# Patient Record
Sex: FEMALE | Race: BLACK OR AFRICAN AMERICAN | NOT HISPANIC OR LATINO | Employment: FULL TIME | URBAN - METROPOLITAN AREA
[De-identification: names, ages, dates, MRNs, and addresses within clinical notes are randomized per-mention and may not be internally consistent; named-entity substitution may affect disease eponyms.]

---

## 2018-04-25 ENCOUNTER — HISTORICAL (OUTPATIENT)
Dept: RADIOLOGY | Facility: HOSPITAL | Age: 48
End: 2018-04-25

## 2018-05-25 ENCOUNTER — HISTORICAL (OUTPATIENT)
Dept: ADMINISTRATIVE | Facility: HOSPITAL | Age: 48
End: 2018-05-25

## 2018-05-25 LAB
ABS NEUT (OLG): 4.28 X10(3)/MCL (ref 2.1–9.2)
BASOPHILS # BLD AUTO: 0.02 X10(3)/MCL
BASOPHILS NFR BLD AUTO: 0 %
EOSINOPHIL # BLD AUTO: 0.09 X10(3)/MCL
EOSINOPHIL NFR BLD AUTO: 1 %
ERYTHROCYTE [DISTWIDTH] IN BLOOD BY AUTOMATED COUNT: 16.2 % (ref 11.5–14.5)
EST. AVERAGE GLUCOSE BLD GHB EST-MCNC: 117 MG/DL
HBA1C MFR BLD: 5.7 % (ref 4.2–6.3)
HCT VFR BLD AUTO: 40.4 % (ref 35–46)
HGB BLD-MCNC: 13.7 GM/DL (ref 12–16)
IMM GRANULOCYTES # BLD AUTO: 0.03 10*3/UL
IMM GRANULOCYTES NFR BLD AUTO: 0 %
LYMPHOCYTES # BLD AUTO: 2.15 X10(3)/MCL
LYMPHOCYTES NFR BLD AUTO: 29 % (ref 13–40)
MCH RBC QN AUTO: 27 PG (ref 26–34)
MCHC RBC AUTO-ENTMCNC: 33.9 GM/DL (ref 31–37)
MCV RBC AUTO: 79.7 FL (ref 80–100)
MONOCYTES # BLD AUTO: 0.79 X10(3)/MCL
MONOCYTES NFR BLD AUTO: 11 % (ref 4–12)
NEUTROPHILS # BLD AUTO: 4.28 X10(3)/MCL
NEUTROPHILS NFR BLD AUTO: 58 X10(3)/MCL
PLATELET # BLD AUTO: 195 X10(3)/MCL (ref 130–400)
PMV BLD AUTO: 10.4 FL (ref 7.4–10.4)
RBC # BLD AUTO: 5.07 X10(6)/MCL (ref 4–5.2)
TSH SERPL-ACNC: 0.78 MIU/L (ref 0.36–3.74)
WBC # SPEC AUTO: 7.4 X10(3)/MCL (ref 4.5–11)

## 2018-07-05 ENCOUNTER — HISTORICAL (OUTPATIENT)
Dept: ADMINISTRATIVE | Facility: HOSPITAL | Age: 48
End: 2018-07-05

## 2018-07-05 LAB
ALBUMIN SERPL-MCNC: 3.4 GM/DL (ref 3.4–5)
ALBUMIN/GLOB SERPL: 1 RATIO (ref 1–2)
ALP SERPL-CCNC: 70 UNIT/L (ref 45–117)
ALT SERPL-CCNC: 22 UNIT/L (ref 12–78)
AST SERPL-CCNC: 18 UNIT/L (ref 15–37)
BILIRUB SERPL-MCNC: 0.5 MG/DL (ref 0.2–1)
BILIRUBIN DIRECT+TOT PNL SERPL-MCNC: 0.1 MG/DL
BILIRUBIN DIRECT+TOT PNL SERPL-MCNC: 0.4 MG/DL
BUN SERPL-MCNC: 19 MG/DL (ref 7–18)
CALCIUM SERPL-MCNC: 9.5 MG/DL (ref 8.5–10.1)
CHLORIDE SERPL-SCNC: 103 MMOL/L (ref 98–107)
CO2 SERPL-SCNC: 27 MMOL/L (ref 21–32)
CREAT SERPL-MCNC: 1.7 MG/DL (ref 0.6–1.3)
ERYTHROCYTE [DISTWIDTH] IN BLOOD BY AUTOMATED COUNT: 13.7 % (ref 11.5–14.5)
GLOBULIN SER-MCNC: 4 GM/ML (ref 2.3–3.5)
GLUCOSE SERPL-MCNC: 92 MG/DL (ref 74–106)
HAV IGM SERPL QL IA: NONREACTIVE
HBV CORE IGM SERPL QL IA: NONREACTIVE
HBV SURFACE AG SERPL QL IA: POSITIVE
HCT VFR BLD AUTO: 40.3 % (ref 35–46)
HCV AB SERPL QL IA: NONREACTIVE
HGB BLD-MCNC: 13.7 GM/DL (ref 12–16)
HIV 1+2 AB+HIV1 P24 AG SERPL QL IA: NONREACTIVE
MCH RBC QN AUTO: 27.4 PG (ref 26–34)
MCHC RBC AUTO-ENTMCNC: 34 GM/DL (ref 31–37)
MCV RBC AUTO: 80.6 FL (ref 80–100)
PLATELET # BLD AUTO: 259 X10(3)/MCL (ref 130–400)
PMV BLD AUTO: 10.7 FL (ref 7.4–10.4)
POTASSIUM SERPL-SCNC: 4.4 MMOL/L (ref 3.5–5.1)
PROT SERPL-MCNC: 7.4 GM/DL (ref 6.4–8.2)
RBC # BLD AUTO: 5 X10(6)/MCL (ref 4–5.2)
SODIUM SERPL-SCNC: 136 MMOL/L (ref 136–145)
T PALLIDUM AB SER QL: NONREACTIVE
WBC # SPEC AUTO: 7.4 X10(3)/MCL (ref 4.5–11)

## 2018-07-19 ENCOUNTER — HOSPITAL ENCOUNTER (OUTPATIENT)
Dept: MEDSURG UNIT | Facility: HOSPITAL | Age: 48
End: 2018-07-20
Attending: OBSTETRICS & GYNECOLOGY | Admitting: OBSTETRICS & GYNECOLOGY

## 2018-07-19 LAB
B-HCG SERPL QL: NEGATIVE
POTASSIUM SERPL-SCNC: 4 MMOL/L (ref 3.5–5.1)

## 2018-07-20 LAB
ABS NEUT (OLG): 9.08 X10(3)/MCL (ref 2.1–9.2)
ALBUMIN SERPL-MCNC: 2.9 GM/DL (ref 3.4–5)
ALBUMIN/GLOB SERPL: 1 RATIO (ref 1–2)
ALP SERPL-CCNC: 40 UNIT/L (ref 45–117)
ALT SERPL-CCNC: 16 UNIT/L (ref 12–78)
AST SERPL-CCNC: 18 UNIT/L (ref 15–37)
BILIRUB SERPL-MCNC: 0.4 MG/DL (ref 0.2–1)
BILIRUBIN DIRECT+TOT PNL SERPL-MCNC: 0.1 MG/DL
BILIRUBIN DIRECT+TOT PNL SERPL-MCNC: 0.3 MG/DL
BUN SERPL-MCNC: 13 MG/DL (ref 7–18)
CALCIUM SERPL-MCNC: 8.4 MG/DL (ref 8.5–10.1)
CHLORIDE SERPL-SCNC: 107 MMOL/L (ref 98–107)
CO2 SERPL-SCNC: 25 MMOL/L (ref 21–32)
CREAT SERPL-MCNC: 1.6 MG/DL (ref 0.6–1.3)
ERYTHROCYTE [DISTWIDTH] IN BLOOD BY AUTOMATED COUNT: 13.2 % (ref 11.5–14.5)
GLOBULIN SER-MCNC: 3.5 GM/ML (ref 2.3–3.5)
GLUCOSE SERPL-MCNC: 119 MG/DL (ref 74–106)
HCT VFR BLD AUTO: 36 % (ref 35–46)
HGB BLD-MCNC: 12.2 GM/DL (ref 12–16)
IMM GRANULOCYTES # BLD AUTO: 0.03 10*3/UL
IMM GRANULOCYTES NFR BLD AUTO: 0 %
LYMPHOCYTES # BLD AUTO: 0.98 X10(3)/MCL
LYMPHOCYTES NFR BLD AUTO: 9 % (ref 13–40)
MCH RBC QN AUTO: 27.4 PG (ref 26–34)
MCHC RBC AUTO-ENTMCNC: 33.9 GM/DL (ref 31–37)
MCV RBC AUTO: 80.7 FL (ref 80–100)
MONOCYTES # BLD AUTO: 0.7 X10(3)/MCL
MONOCYTES NFR BLD AUTO: 6 % (ref 4–12)
NEUTROPHILS # BLD AUTO: 9.08 X10(3)/MCL
NEUTROPHILS NFR BLD AUTO: 84 X10(3)/MCL
PLATELET # BLD AUTO: 171 X10(3)/MCL (ref 130–400)
PMV BLD AUTO: 10.9 FL (ref 7.4–10.4)
POTASSIUM SERPL-SCNC: 4.6 MMOL/L (ref 3.5–5.1)
PROT SERPL-MCNC: 6.4 GM/DL (ref 6.4–8.2)
RBC # BLD AUTO: 4.46 X10(6)/MCL (ref 4–5.2)
SODIUM SERPL-SCNC: 138 MMOL/L (ref 136–145)
WBC # SPEC AUTO: 10.8 X10(3)/MCL (ref 4.5–11)

## 2020-07-09 ENCOUNTER — HISTORICAL (OUTPATIENT)
Dept: FAMILY MEDICINE | Facility: CLINIC | Age: 50
End: 2020-07-09

## 2020-07-09 LAB
ABS NEUT (OLG): 2.86 X10(3)/MCL (ref 2.1–9.2)
ALBUMIN SERPL-MCNC: 3.3 GM/DL (ref 3.4–5)
ALBUMIN/GLOB SERPL: 0.8 RATIO (ref 1.1–2)
ALP SERPL-CCNC: 70 UNIT/L (ref 45–117)
ALT SERPL-CCNC: 22 UNIT/L (ref 12–78)
APPEARANCE, UA: CLEAR
AST SERPL-CCNC: 15 UNIT/L (ref 15–37)
BACTERIA #/AREA URNS AUTO: ABNORMAL /HPF
BASOPHILS # BLD AUTO: 0 X10(3)/MCL (ref 0–0.2)
BASOPHILS NFR BLD AUTO: 0 %
BILIRUB SERPL-MCNC: 0.7 MG/DL (ref 0.2–1)
BILIRUB UR QL STRIP: NEGATIVE
BILIRUBIN DIRECT+TOT PNL SERPL-MCNC: 0.2 MG/DL (ref 0–0.2)
BILIRUBIN DIRECT+TOT PNL SERPL-MCNC: 0.5 MG/DL
BUN SERPL-MCNC: 12 MG/DL (ref 7–18)
CALCIUM SERPL-MCNC: 9.2 MG/DL (ref 8.5–10.1)
CHLORIDE SERPL-SCNC: 105 MMOL/L (ref 98–107)
CHOLEST SERPL-MCNC: 158 MG/DL
CHOLEST/HDLC SERPL: 3.7 {RATIO} (ref 0–4.4)
CO2 SERPL-SCNC: 29 MMOL/L (ref 21–32)
COLOR UR: YELLOW
CREAT SERPL-MCNC: 1.7 MG/DL (ref 0.6–1.3)
EOSINOPHIL # BLD AUTO: 0.1 X10(3)/MCL (ref 0–0.9)
EOSINOPHIL NFR BLD AUTO: 2 %
ERYTHROCYTE [DISTWIDTH] IN BLOOD BY AUTOMATED COUNT: 13.9 % (ref 11.5–14.5)
EST. AVERAGE GLUCOSE BLD GHB EST-MCNC: 134 MG/DL
GLOBULIN SER-MCNC: 4.1 GM/ML (ref 2.3–3.5)
GLUCOSE (UA): NEGATIVE
GLUCOSE SERPL-MCNC: 107 MG/DL (ref 74–106)
HBA1C MFR BLD: 6.3 % (ref 4.2–6.3)
HCT VFR BLD AUTO: 45.5 % (ref 35–46)
HDLC SERPL-MCNC: 43 MG/DL (ref 40–59)
HGB BLD-MCNC: 15.1 GM/DL (ref 12–16)
HGB UR QL STRIP: NEGATIVE
HYALINE CASTS #/AREA URNS LPF: ABNORMAL /LPF
IMM GRANULOCYTES # BLD AUTO: 0.02 10*3/UL
IMM GRANULOCYTES NFR BLD AUTO: 0 %
KETONES UR QL STRIP: NEGATIVE
LDLC SERPL CALC-MCNC: 89 MG/DL
LEUKOCYTE ESTERASE UR QL STRIP: NEGATIVE
LYMPHOCYTES # BLD AUTO: 2.1 X10(3)/MCL (ref 0.6–4.6)
LYMPHOCYTES NFR BLD AUTO: 35 %
MCH RBC QN AUTO: 26.4 PG (ref 26–34)
MCHC RBC AUTO-ENTMCNC: 33.2 GM/DL (ref 31–37)
MCV RBC AUTO: 79.5 FL (ref 80–100)
MONOCYTES # BLD AUTO: 0.8 X10(3)/MCL (ref 0.1–1.3)
MONOCYTES NFR BLD AUTO: 13 %
NEUTROPHILS # BLD AUTO: 2.86 X10(3)/MCL (ref 2.1–9.2)
NEUTROPHILS NFR BLD AUTO: 49 %
NITRITE UR QL STRIP: NEGATIVE
PH UR STRIP: 6 [PH] (ref 4.5–8)
PLATELET # BLD AUTO: 166 X10(3)/MCL (ref 130–400)
PMV BLD AUTO: 11.1 FL (ref 7.4–10.4)
POTASSIUM SERPL-SCNC: 4 MMOL/L (ref 3.5–5.1)
PROT SERPL-MCNC: 7.4 GM/DL (ref 6.4–8.2)
PROT UR QL STRIP: 100 MG/DL
RBC # BLD AUTO: 5.72 X10(6)/MCL (ref 4–5.2)
RBC #/AREA URNS AUTO: ABNORMAL /HPF
SODIUM SERPL-SCNC: 139 MMOL/L (ref 136–145)
SP GR UR STRIP: 1.01 (ref 1–1.03)
SQUAMOUS #/AREA URNS LPF: ABNORMAL /LPF
T4 FREE SERPL-MCNC: 1.13 NG/DL (ref 0.76–1.46)
TRIGL SERPL-MCNC: 131 MG/DL
TSH SERPL-ACNC: 0.77 MIU/L (ref 0.36–3.74)
UROBILINOGEN UR STRIP-ACNC: 2 MG/DL
VLDLC SERPL CALC-MCNC: 26 MG/DL
WBC # SPEC AUTO: 5.8 X10(3)/MCL (ref 4.5–11)
WBC #/AREA URNS AUTO: ABNORMAL /HPF

## 2020-08-31 ENCOUNTER — HISTORICAL (OUTPATIENT)
Dept: ADMINISTRATIVE | Facility: HOSPITAL | Age: 50
End: 2020-08-31

## 2020-08-31 LAB
CREAT UR-MCNC: 42 MG/DL
PROT UR STRIP-MCNC: 45.1 MG/DL
PROT/CREAT UR-RTO: 1073.8 MG/GM

## 2020-09-29 ENCOUNTER — HISTORICAL (OUTPATIENT)
Dept: ADMINISTRATIVE | Facility: HOSPITAL | Age: 50
End: 2020-09-29

## 2020-09-29 LAB
ABS NEUT (OLG): 4.36 X10(3)/MCL (ref 2.1–9.2)
ALBUMIN SERPL-MCNC: 3.5 GM/DL (ref 3.4–5)
ALBUMIN/GLOB SERPL: 0.8 RATIO (ref 1.1–2)
ALP SERPL-CCNC: 84 UNIT/L (ref 45–117)
ALT SERPL-CCNC: 22 UNIT/L (ref 12–78)
AST SERPL-CCNC: 13 UNIT/L (ref 15–37)
BASOPHILS # BLD AUTO: 0 X10(3)/MCL (ref 0–0.2)
BASOPHILS NFR BLD AUTO: 0 %
BILIRUB SERPL-MCNC: 0.3 MG/DL (ref 0.2–1)
BILIRUBIN DIRECT+TOT PNL SERPL-MCNC: 0.1 MG/DL (ref 0–0.2)
BILIRUBIN DIRECT+TOT PNL SERPL-MCNC: 0.2 MG/DL
BUN SERPL-MCNC: 28 MG/DL (ref 7–18)
CALCIUM SERPL-MCNC: 9.7 MG/DL (ref 8.5–10.1)
CHLORIDE SERPL-SCNC: 105 MMOL/L (ref 98–107)
CO2 SERPL-SCNC: 31 MMOL/L (ref 21–32)
CREAT SERPL-MCNC: 1.8 MG/DL (ref 0.6–1.3)
EOSINOPHIL # BLD AUTO: 0.2 X10(3)/MCL (ref 0–0.9)
EOSINOPHIL NFR BLD AUTO: 2 %
ERYTHROCYTE [DISTWIDTH] IN BLOOD BY AUTOMATED COUNT: 14.7 % (ref 11.5–14.5)
GLOBULIN SER-MCNC: 4.3 GM/ML (ref 2.3–3.5)
GLUCOSE SERPL-MCNC: 77 MG/DL (ref 74–106)
HCT VFR BLD AUTO: 43.3 % (ref 35–46)
HGB BLD-MCNC: 14.2 GM/DL (ref 12–16)
IMM GRANULOCYTES # BLD AUTO: 0.02 10*3/UL
IMM GRANULOCYTES NFR BLD AUTO: 0 %
LYMPHOCYTES # BLD AUTO: 1.6 X10(3)/MCL (ref 0.6–4.6)
LYMPHOCYTES NFR BLD AUTO: 24 %
MCH RBC QN AUTO: 26.4 PG (ref 26–34)
MCHC RBC AUTO-ENTMCNC: 32.8 GM/DL (ref 31–37)
MCV RBC AUTO: 80.5 FL (ref 80–100)
MONOCYTES # BLD AUTO: 0.8 X10(3)/MCL (ref 0.1–1.3)
MONOCYTES NFR BLD AUTO: 11 %
NEUTROPHILS # BLD AUTO: 4.36 X10(3)/MCL (ref 2.1–9.2)
NEUTROPHILS NFR BLD AUTO: 62 %
PLATELET # BLD AUTO: 221 X10(3)/MCL (ref 130–400)
PMV BLD AUTO: 10.5 FL (ref 7.4–10.4)
POTASSIUM SERPL-SCNC: 4 MMOL/L (ref 3.5–5.1)
PROT SERPL-MCNC: 7.8 GM/DL (ref 6.4–8.2)
RBC # BLD AUTO: 5.38 X10(6)/MCL (ref 4–5.2)
SODIUM SERPL-SCNC: 140 MMOL/L (ref 136–145)
WBC # SPEC AUTO: 7 X10(3)/MCL (ref 4.5–11)

## 2020-10-12 ENCOUNTER — HISTORICAL (OUTPATIENT)
Dept: ADMINISTRATIVE | Facility: HOSPITAL | Age: 50
End: 2020-10-12

## 2020-10-12 LAB
EST. AVERAGE GLUCOSE BLD GHB EST-MCNC: 151 MG/DL
HBA1C MFR BLD: 6.9 % (ref 4.2–6.3)

## 2020-10-29 ENCOUNTER — HISTORICAL (OUTPATIENT)
Dept: NEPHROLOGY | Facility: CLINIC | Age: 50
End: 2020-10-29

## 2020-10-29 LAB
ALBUMIN SERPL-MCNC: 3.5 GM/DL (ref 3.5–5)
ALBUMIN/GLOB SERPL: 0.9 RATIO (ref 1.1–2)
ALP SERPL-CCNC: 80 UNIT/L (ref 40–150)
ALT SERPL-CCNC: 14 UNIT/L (ref 0–55)
APPEARANCE, UA: CLEAR
AST SERPL-CCNC: 25 UNIT/L (ref 5–34)
BACTERIA #/AREA URNS AUTO: ABNORMAL /HPF
BILIRUB SERPL-MCNC: 0.4 MG/DL
BILIRUB UR QL STRIP: NEGATIVE
BILIRUBIN DIRECT+TOT PNL SERPL-MCNC: 0.1 MG/DL (ref 0–0.5)
BILIRUBIN DIRECT+TOT PNL SERPL-MCNC: 0.3 MG/DL (ref 0–0.8)
BUN SERPL-MCNC: 18 MG/DL (ref 9.8–20.1)
CALCIUM SERPL-MCNC: 9.7 MG/DL (ref 8.4–10.2)
CHLORIDE SERPL-SCNC: 103 MMOL/L (ref 98–107)
CO2 SERPL-SCNC: 29 MMOL/L (ref 22–29)
COLOR UR: ABNORMAL
CREAT SERPL-MCNC: 1.75 MG/DL (ref 0.55–1.02)
CREAT UR-MCNC: 85.1 MG/DL (ref 45–106)
GLOBULIN SER-MCNC: 3.7 GM/DL (ref 2.4–3.5)
GLUCOSE (UA): NEGATIVE
GLUCOSE SERPL-MCNC: 129 MG/DL (ref 74–100)
HGB UR QL STRIP: NEGATIVE
HYALINE CASTS #/AREA URNS LPF: ABNORMAL /LPF
KETONES UR QL STRIP: NEGATIVE
LEUKOCYTE ESTERASE UR QL STRIP: NEGATIVE
NITRITE UR QL STRIP: NEGATIVE
PH UR STRIP: 5.5 [PH] (ref 4.5–8)
POTASSIUM SERPL-SCNC: 3.9 MMOL/L (ref 3.5–5.1)
PROT SERPL-MCNC: 7.2 GM/DL (ref 6.4–8.3)
PROT UR QL STRIP: 50 MG/DL
PROT UR STRIP-MCNC: 63.6 MG/DL
PROT/CREAT UR-RTO: 747.4 MG/GM
RBC #/AREA URNS AUTO: ABNORMAL /HPF
SODIUM SERPL-SCNC: 138 MMOL/L (ref 136–145)
SP GR UR STRIP: 1.01 (ref 1–1.03)
SQUAMOUS #/AREA URNS LPF: ABNORMAL /LPF
URATE SERPL-MCNC: 7.9 MG/DL (ref 2.6–6)
UROBILINOGEN UR STRIP-ACNC: NORMAL
WBC #/AREA URNS AUTO: ABNORMAL /HPF

## 2020-11-30 ENCOUNTER — HISTORICAL (OUTPATIENT)
Dept: RADIOLOGY | Facility: HOSPITAL | Age: 50
End: 2020-11-30

## 2021-01-26 ENCOUNTER — HISTORICAL (OUTPATIENT)
Dept: NEPHROLOGY | Facility: CLINIC | Age: 51
End: 2021-01-26

## 2021-01-26 LAB
ALBUMIN SERPL-MCNC: 3.7 GM/DL (ref 3.5–5)
ALBUMIN/GLOB SERPL: 1 RATIO (ref 1.1–2)
ALP SERPL-CCNC: 88 UNIT/L (ref 40–150)
ALT SERPL-CCNC: 18 UNIT/L (ref 0–55)
APPEARANCE, UA: CLEAR
AST SERPL-CCNC: 18 UNIT/L (ref 5–34)
BACTERIA #/AREA URNS AUTO: ABNORMAL /HPF
BILIRUB SERPL-MCNC: 0.4 MG/DL
BILIRUB UR QL STRIP: NEGATIVE
BILIRUBIN DIRECT+TOT PNL SERPL-MCNC: 0.1 MG/DL (ref 0–0.5)
BILIRUBIN DIRECT+TOT PNL SERPL-MCNC: 0.3 MG/DL (ref 0–0.8)
BUN SERPL-MCNC: 23 MG/DL (ref 9.8–20.1)
CALCIUM SERPL-MCNC: 9.6 MG/DL (ref 8.4–10.2)
CHLORIDE SERPL-SCNC: 103 MMOL/L (ref 98–107)
CO2 SERPL-SCNC: 27 MMOL/L (ref 22–29)
COLOR UR: ABNORMAL
CREAT SERPL-MCNC: 1.78 MG/DL (ref 0.55–1.02)
CREAT UR-MCNC: 57.1 MG/DL (ref 45–106)
GLOBULIN SER-MCNC: 3.7 GM/DL (ref 2.4–3.5)
GLUCOSE (UA): NEGATIVE
GLUCOSE SERPL-MCNC: 111 MG/DL (ref 74–100)
HAV IGM SERPL QL IA: NONREACTIVE
HBV CORE IGM SERPL QL IA: NONREACTIVE
HBV SURFACE AG SERPL QL IA: REACTIVE
HBV SURFACE AG SERPLBLD QL IA.RAPID: ABNORMAL
HCV AB SERPL QL IA: NONREACTIVE
HGB UR QL STRIP: NEGATIVE
HIV 1+2 AB+HIV1 P24 AG SERPL QL IA: NONREACTIVE
HYALINE CASTS #/AREA URNS LPF: ABNORMAL /LPF
KETONES UR QL STRIP: NEGATIVE
LEUKOCYTE ESTERASE UR QL STRIP: NEGATIVE
NITRITE UR QL STRIP: NEGATIVE
PH UR STRIP: 6 [PH] (ref 4.5–8)
POTASSIUM SERPL-SCNC: 4 MMOL/L (ref 3.5–5.1)
PROT SERPL-MCNC: 7.4 GM/DL (ref 6.4–8.3)
PROT UR QL STRIP: 30 MG/DL
PROT UR STRIP-MCNC: 42.6 MG/DL
PROT/CREAT UR-RTO: 746.1 MG/GM
RBC #/AREA URNS AUTO: ABNORMAL /HPF
SERINE PROT 3-ARUP: 2 AU/ML
SODIUM SERPL-SCNC: 138 MMOL/L (ref 136–145)
SP GR UR STRIP: 1 (ref 1–1.03)
SQUAMOUS #/AREA URNS LPF: ABNORMAL /LPF
URATE SERPL-MCNC: 10 MG/DL (ref 2.6–6)
UROBILINOGEN UR STRIP-ACNC: NORMAL
WBC #/AREA URNS AUTO: ABNORMAL /HPF

## 2021-02-19 ENCOUNTER — HISTORICAL (OUTPATIENT)
Dept: RADIOLOGY | Facility: HOSPITAL | Age: 51
End: 2021-02-19

## 2021-02-19 LAB
ABS NEUT (OLG): 4.11 X10(3)/MCL (ref 2.1–9.2)
ALBUMIN SERPL-MCNC: 3.6 GM/DL (ref 3.5–5)
ALBUMIN/GLOB SERPL: 1.1 RATIO (ref 1.1–2)
ALP SERPL-CCNC: 94 UNIT/L (ref 40–150)
ALT SERPL-CCNC: 18 UNIT/L (ref 0–55)
AST SERPL-CCNC: 18 UNIT/L (ref 5–34)
BASOPHILS # BLD AUTO: 0 X10(3)/MCL (ref 0–0.2)
BASOPHILS NFR BLD AUTO: 1 %
BILIRUB SERPL-MCNC: 0.4 MG/DL
BILIRUBIN DIRECT+TOT PNL SERPL-MCNC: 0.1 MG/DL (ref 0–0.5)
BILIRUBIN DIRECT+TOT PNL SERPL-MCNC: 0.3 MG/DL (ref 0–0.8)
BUN SERPL-MCNC: 22 MG/DL (ref 9.8–20.1)
CALCIUM SERPL-MCNC: 9.6 MG/DL (ref 8.4–10.2)
CHLORIDE SERPL-SCNC: 104 MMOL/L (ref 98–107)
CO2 SERPL-SCNC: 27 MMOL/L (ref 22–29)
CREAT SERPL-MCNC: 1.74 MG/DL (ref 0.55–1.02)
EOSINOPHIL # BLD AUTO: 0.2 X10(3)/MCL (ref 0–0.9)
EOSINOPHIL NFR BLD AUTO: 2 %
ERYTHROCYTE [DISTWIDTH] IN BLOOD BY AUTOMATED COUNT: 13.6 % (ref 11.5–14.5)
GLOBULIN SER-MCNC: 3.3 GM/DL (ref 2.4–3.5)
GLUCOSE SERPL-MCNC: 99 MG/DL (ref 74–100)
HAV AB SER QL IA: NONREACTIVE
HAV IGM SERPL QL IA: NONREACTIVE
HBV CORE AB SERPL QL IA: REACTIVE
HBV SURFACE AB SER-ACNC: 0 M[IU]/ML
HBV SURFACE AB SERPL IA-ACNC: NONREACTIVE M[IU]/ML
HBV SURFACE AG SERPL QL IA: REACTIVE
HBV SURFACE AG SERPLBLD QL IA.RAPID: ABNORMAL
HCT VFR BLD AUTO: 38.6 % (ref 35–46)
HCV AB SERPL QL IA: NONREACTIVE
HGB BLD-MCNC: 12.9 GM/DL (ref 12–16)
HIV 1+2 AB+HIV1 P24 AG SERPL QL IA: NONREACTIVE
IMM GRANULOCYTES # BLD AUTO: 0.03 10*3/UL
IMM GRANULOCYTES NFR BLD AUTO: 0 %
INR PPP: 0.93 (ref 0.9–1.2)
LYMPHOCYTES # BLD AUTO: 1.9 X10(3)/MCL (ref 0.6–4.6)
LYMPHOCYTES NFR BLD AUTO: 27 %
MCH RBC QN AUTO: 27 PG (ref 26–34)
MCHC RBC AUTO-ENTMCNC: 33.4 GM/DL (ref 31–37)
MCV RBC AUTO: 80.8 FL (ref 80–100)
MONOCYTES # BLD AUTO: 0.8 X10(3)/MCL (ref 0.1–1.3)
MONOCYTES NFR BLD AUTO: 11 %
NEUTROPHILS # BLD AUTO: 4.11 X10(3)/MCL (ref 2.1–9.2)
NEUTROPHILS NFR BLD AUTO: 58 %
PLATELET # BLD AUTO: 216 X10(3)/MCL (ref 130–400)
PMV BLD AUTO: 11.9 FL (ref 7.4–10.4)
POTASSIUM SERPL-SCNC: 4.8 MMOL/L (ref 3.5–5.1)
PROT SERPL-MCNC: 6.9 GM/DL (ref 6.4–8.3)
PROTHROMBIN TIME: 12.1 SECOND(S) (ref 11.9–14.4)
RBC # BLD AUTO: 4.78 X10(6)/MCL (ref 4–5.2)
SODIUM SERPL-SCNC: 137 MMOL/L (ref 136–145)
T PALLIDUM AB SER QL: NONREACTIVE
WBC # SPEC AUTO: 7.1 X10(3)/MCL (ref 4.5–11)

## 2021-05-03 ENCOUNTER — HISTORICAL (OUTPATIENT)
Dept: RADIOLOGY | Facility: HOSPITAL | Age: 51
End: 2021-05-03

## 2021-06-08 ENCOUNTER — HISTORICAL (OUTPATIENT)
Dept: NEPHROLOGY | Facility: CLINIC | Age: 51
End: 2021-06-08

## 2021-06-08 LAB
ABS NEUT (OLG): 4.28 X10(3)/MCL (ref 2.1–9.2)
ALBUMIN % SPEP (OHS): 46.73 % (ref 48.1–59.5)
ALBUMIN SERPL-MCNC: 3.5 GM/DL (ref 3.5–5)
ALBUMIN SERPL-MCNC: 3.6 GM/DL (ref 3.5–5)
ALBUMIN/GLOB SERPL: 0.9 RATIO (ref 1.1–2)
ALBUMIN/GLOB SERPL: 1.1 RATIO (ref 1.1–2)
ALP SERPL-CCNC: 71 UNIT/L (ref 40–150)
ALPHA 1 GLOB (OHS): 0.21 GM/DL (ref 0–0.4)
ALPHA 1 GLOB% (OHS): 3.04 % (ref 2.3–4.9)
ALPHA 2 GLOB % (OHS): 12.92 % (ref 6.9–13)
ALPHA 2 GLOB (OHS): 0.89 GM/DL (ref 0.4–1)
ALT SERPL-CCNC: 17 UNIT/L (ref 0–55)
APPEARANCE, UA: CLEAR
AST SERPL-CCNC: 20 UNIT/L (ref 5–34)
BACTERIA #/AREA URNS AUTO: ABNORMAL /HPF
BASOPHILS # BLD AUTO: 0 X10(3)/MCL (ref 0–0.2)
BASOPHILS NFR BLD AUTO: 0 %
BETA GLOB (OHS): 1.1 GM/DL (ref 0.7–1.3)
BETA GLOB% (OHS): 15.96 % (ref 13.8–19.7)
BILIRUB SERPL-MCNC: 0.5 MG/DL
BILIRUB UR QL STRIP: NEGATIVE
BILIRUBIN DIRECT+TOT PNL SERPL-MCNC: 0.2 MG/DL (ref 0–0.5)
BILIRUBIN DIRECT+TOT PNL SERPL-MCNC: 0.3 MG/DL (ref 0–0.8)
BUN SERPL-MCNC: 16.6 MG/DL (ref 9.8–20.1)
CALCIUM SERPL-MCNC: 9.8 MG/DL (ref 8.4–10.2)
CHLORIDE SERPL-SCNC: 105 MMOL/L (ref 98–107)
CO2 SERPL-SCNC: 25 MMOL/L (ref 22–29)
COLOR UR: ABNORMAL
CREAT SERPL-MCNC: 1.73 MG/DL (ref 0.55–1.02)
CREAT UR-MCNC: 89.1 MG/DL (ref 45–106)
EOSINOPHIL # BLD AUTO: 0.2 X10(3)/MCL (ref 0–0.9)
EOSINOPHIL NFR BLD AUTO: 3 %
ERYTHROCYTE [DISTWIDTH] IN BLOOD BY AUTOMATED COUNT: 14.4 % (ref 11.5–14.5)
GAMMA GLOBULIN % (OHS): 21.34 % (ref 10.1–21.9)
GAMMA GLOBULIN (OHS): 1.47 GM/DL (ref 0.4–1.8)
GLOBULIN SER-MCNC: 3.3 GM/DL (ref 2.4–3.5)
GLOBULIN SER-MCNC: 3.7 GM/DL (ref 2.4–3.5)
GLUCOSE (UA): NEGATIVE
GLUCOSE SERPL-MCNC: 116 MG/DL (ref 74–100)
HCT VFR BLD AUTO: 39.4 % (ref 35–46)
HGB BLD-MCNC: 13.1 GM/DL (ref 12–16)
HGB UR QL STRIP: NEGATIVE
HYALINE CASTS #/AREA URNS LPF: ABNORMAL /LPF
IFE INTERPRETATION (OHS): ABNORMAL
IGA SERPL-MCNC: 178 MG/DL (ref 65–421)
IGG SERPL-MCNC: 1380 MG/DL (ref 552–1632)
IGM SERPL-MCNC: 42 MG/DL (ref 33–293)
IMM GRANULOCYTES # BLD AUTO: 0.03 10*3/UL
IMM GRANULOCYTES NFR BLD AUTO: 0 %
KETONES UR QL STRIP: NEGATIVE
LEUKOCYTE ESTERASE UR QL STRIP: NEGATIVE
LYMPHOCYTES # BLD AUTO: 1.9 X10(3)/MCL (ref 0.6–4.6)
LYMPHOCYTES NFR BLD AUTO: 27 %
M SPIKE % (OHS): 3.51 %
M SPIKE (OHS): 0.24 GM/DL
MCH RBC QN AUTO: 26.1 PG (ref 26–34)
MCHC RBC AUTO-ENTMCNC: 33.2 GM/DL (ref 31–37)
MCV RBC AUTO: 78.6 FL (ref 80–100)
MONOCYTES # BLD AUTO: 0.6 X10(3)/MCL (ref 0.1–1.3)
MONOCYTES NFR BLD AUTO: 9 %
NEUTROPHILS # BLD AUTO: 4.28 X10(3)/MCL (ref 2.1–9.2)
NEUTROPHILS NFR BLD AUTO: 61 %
NITRITE UR QL STRIP: NEGATIVE
NRBC BLD AUTO-RTO: 0 % (ref 0–0.2)
PEP GRAPH (OHS): ABNORMAL
PH UR STRIP: 7 [PH] (ref 4.5–8)
PLATELET # BLD AUTO: 221 X10(3)/MCL (ref 130–400)
PMV BLD AUTO: 10.1 FL (ref 7.4–10.4)
POTASSIUM SERPL-SCNC: 4.3 MMOL/L (ref 3.5–5.1)
PROT SERPL-MCNC: 6.9 GM/DL (ref 6.4–8.3)
PROT SERPL-MCNC: 7.2 GM/DL (ref 6.4–8.3)
PROT UR QL STRIP: 50 MG/DL
PROT UR STRIP-MCNC: 64.1 MG/DL
PROT/CREAT UR-RTO: 719.4 MG/GM CR
RBC # BLD AUTO: 5.01 X10(6)/MCL (ref 4–5.2)
RBC #/AREA URNS AUTO: ABNORMAL /HPF
SODIUM SERPL-SCNC: 138 MMOL/L (ref 136–145)
SP GR UR STRIP: 1 (ref 1–1.03)
SQUAMOUS #/AREA URNS LPF: ABNORMAL /LPF
URATE SERPL-MCNC: 9.7 MG/DL (ref 2.6–6)
UROBILINOGEN UR STRIP-ACNC: NORMAL
WBC # SPEC AUTO: 7 X10(3)/MCL (ref 4.5–11)
WBC #/AREA URNS AUTO: ABNORMAL /HPF

## 2021-07-13 ENCOUNTER — HISTORICAL (OUTPATIENT)
Dept: ADMINISTRATIVE | Facility: HOSPITAL | Age: 51
End: 2021-07-13

## 2021-07-13 LAB
ABS NEUT (OLG): 5.92 X10(3)/MCL (ref 2.1–9.2)
ALBUMIN SERPL-MCNC: 3.6 GM/DL (ref 3.5–5)
ALBUMIN/GLOB SERPL: 1 RATIO (ref 1.1–2)
ALP SERPL-CCNC: 97 UNIT/L (ref 40–150)
ALT SERPL-CCNC: 17 UNIT/L (ref 0–55)
AST SERPL-CCNC: 18 UNIT/L (ref 5–34)
BASOPHILS # BLD AUTO: 0 X10(3)/MCL (ref 0–0.2)
BASOPHILS NFR BLD AUTO: 0 %
BILIRUB SERPL-MCNC: 0.3 MG/DL
BILIRUBIN DIRECT+TOT PNL SERPL-MCNC: 0.1 MG/DL (ref 0–0.5)
BILIRUBIN DIRECT+TOT PNL SERPL-MCNC: 0.2 MG/DL (ref 0–0.8)
BUN SERPL-MCNC: 20.7 MG/DL (ref 9.8–20.1)
CALCIUM SERPL-MCNC: 9.8 MG/DL (ref 8.4–10.2)
CHLORIDE SERPL-SCNC: 103 MMOL/L (ref 98–107)
CO2 SERPL-SCNC: 28 MMOL/L (ref 22–29)
CREAT SERPL-MCNC: 2.02 MG/DL (ref 0.55–1.02)
EOSINOPHIL # BLD AUTO: 0.2 X10(3)/MCL (ref 0–0.9)
EOSINOPHIL NFR BLD AUTO: 2 %
ERYTHROCYTE [DISTWIDTH] IN BLOOD BY AUTOMATED COUNT: 14.6 % (ref 11.5–14.5)
GLOBULIN SER-MCNC: 3.6 GM/DL (ref 2.4–3.5)
GLUCOSE SERPL-MCNC: 106 MG/DL (ref 74–100)
HCT VFR BLD AUTO: 40.4 % (ref 35–46)
HGB BLD-MCNC: 13.4 GM/DL (ref 12–16)
IMM GRANULOCYTES # BLD AUTO: 0.06 10*3/UL
IMM GRANULOCYTES NFR BLD AUTO: 1 %
LYMPHOCYTES # BLD AUTO: 2 X10(3)/MCL (ref 0.6–4.6)
LYMPHOCYTES NFR BLD AUTO: 22 %
MCH RBC QN AUTO: 26.4 PG (ref 26–34)
MCHC RBC AUTO-ENTMCNC: 33.2 GM/DL (ref 31–37)
MCV RBC AUTO: 79.7 FL (ref 80–100)
MONOCYTES # BLD AUTO: 1 X10(3)/MCL (ref 0.1–1.3)
MONOCYTES NFR BLD AUTO: 11 %
NEUTROPHILS # BLD AUTO: 5.92 X10(3)/MCL (ref 2.1–9.2)
NEUTROPHILS NFR BLD AUTO: 64 %
NRBC BLD AUTO-RTO: 0 % (ref 0–0.2)
PLATELET # BLD AUTO: 252 X10(3)/MCL (ref 130–400)
PMV BLD AUTO: 10.6 FL (ref 7.4–10.4)
POTASSIUM SERPL-SCNC: 4.5 MMOL/L (ref 3.5–5.1)
PROT SERPL-MCNC: 7.2 GM/DL (ref 6.4–8.3)
RBC # BLD AUTO: 5.07 X10(6)/MCL (ref 4–5.2)
SODIUM SERPL-SCNC: 137 MMOL/L (ref 136–145)
WBC # SPEC AUTO: 9.3 X10(3)/MCL (ref 4.5–11)

## 2021-08-02 ENCOUNTER — HISTORICAL (OUTPATIENT)
Dept: HEMATOLOGY/ONCOLOGY | Facility: CLINIC | Age: 51
End: 2021-08-02

## 2021-08-02 LAB
ABS NEUT (OLG): 6.09 X10(3)/MCL (ref 2.1–9.2)
ALBUMIN SERPL-MCNC: 3.5 GM/DL (ref 3.5–5)
ALBUMIN/GLOB SERPL: 1 RATIO (ref 1.1–2)
ALP SERPL-CCNC: 71 UNIT/L (ref 40–150)
ALT SERPL-CCNC: 17 UNIT/L (ref 0–55)
AST SERPL-CCNC: 16 UNIT/L (ref 5–34)
BASOPHILS # BLD AUTO: 0 X10(3)/MCL (ref 0–0.2)
BASOPHILS NFR BLD AUTO: 0 %
BILIRUB SERPL-MCNC: 0.4 MG/DL
BILIRUBIN DIRECT+TOT PNL SERPL-MCNC: 0.2 MG/DL (ref 0–0.5)
BILIRUBIN DIRECT+TOT PNL SERPL-MCNC: 0.2 MG/DL (ref 0–0.8)
BUN SERPL-MCNC: 16.3 MG/DL (ref 9.8–20.1)
CALCIUM SERPL-MCNC: 10.1 MG/DL (ref 8.4–10.2)
CHLORIDE SERPL-SCNC: 106 MMOL/L (ref 98–107)
CO2 SERPL-SCNC: 29 MMOL/L (ref 22–29)
CREAT SERPL-MCNC: 1.83 MG/DL (ref 0.55–1.02)
EOSINOPHIL # BLD AUTO: 0.2 X10(3)/MCL (ref 0–0.9)
EOSINOPHIL NFR BLD AUTO: 2 %
ERYTHROCYTE [DISTWIDTH] IN BLOOD BY AUTOMATED COUNT: 14.6 % (ref 11.5–14.5)
GLOBULIN SER-MCNC: 3.6 GM/DL (ref 2.4–3.5)
GLUCOSE SERPL-MCNC: 118 MG/DL (ref 74–100)
HCT VFR BLD AUTO: 40.8 % (ref 35–46)
HGB BLD-MCNC: 13.2 GM/DL (ref 12–16)
IMM GRANULOCYTES # BLD AUTO: 0.05 10*3/UL
IMM GRANULOCYTES NFR BLD AUTO: 0 %
LYMPHOCYTES # BLD AUTO: 2 X10(3)/MCL (ref 0.6–4.6)
LYMPHOCYTES NFR BLD AUTO: 22 %
MCH RBC QN AUTO: 25.9 PG (ref 26–34)
MCHC RBC AUTO-ENTMCNC: 32.4 GM/DL (ref 31–37)
MCV RBC AUTO: 80 FL (ref 80–100)
MONOCYTES # BLD AUTO: 0.9 X10(3)/MCL (ref 0.1–1.3)
MONOCYTES NFR BLD AUTO: 10 %
NEUTROPHILS # BLD AUTO: 6.09 X10(3)/MCL (ref 2.1–9.2)
NEUTROPHILS NFR BLD AUTO: 66 %
NRBC BLD AUTO-RTO: 0 % (ref 0–0.2)
PLATELET # BLD AUTO: 222 X10(3)/MCL (ref 130–400)
PMV BLD AUTO: 10.1 FL (ref 7.4–10.4)
POTASSIUM SERPL-SCNC: 4.7 MMOL/L (ref 3.5–5.1)
PROT SERPL-MCNC: 7.1 GM/DL (ref 6.4–8.3)
RBC # BLD AUTO: 5.1 X10(6)/MCL (ref 4–5.2)
SODIUM SERPL-SCNC: 139 MMOL/L (ref 136–145)
WBC # SPEC AUTO: 9.3 X10(3)/MCL (ref 4.5–11)

## 2021-08-03 ENCOUNTER — HISTORICAL (OUTPATIENT)
Dept: ENDOSCOPY | Facility: HOSPITAL | Age: 51
End: 2021-08-03

## 2021-11-04 ENCOUNTER — HISTORICAL (OUTPATIENT)
Dept: ADMINISTRATIVE | Facility: HOSPITAL | Age: 51
End: 2021-11-04

## 2021-11-04 LAB
ABS NEUT (OLG): 3.99 X10(3)/MCL (ref 2.1–9.2)
ALBUMIN SERPL-MCNC: 3.6 GM/DL (ref 3.5–5)
ALBUMIN/GLOB SERPL: 1 RATIO (ref 1.1–2)
ALP SERPL-CCNC: 85 UNIT/L (ref 40–150)
ALT SERPL-CCNC: 16 UNIT/L (ref 0–55)
APPEARANCE, UA: CLEAR
AST SERPL-CCNC: 18 UNIT/L (ref 5–34)
BACTERIA #/AREA URNS AUTO: ABNORMAL /HPF
BASOPHILS # BLD AUTO: 0 X10(3)/MCL (ref 0–0.2)
BASOPHILS NFR BLD AUTO: 0 %
BILIRUB SERPL-MCNC: 0.5 MG/DL
BILIRUB UR QL STRIP: NEGATIVE
BILIRUBIN DIRECT+TOT PNL SERPL-MCNC: 0.2 MG/DL (ref 0–0.5)
BILIRUBIN DIRECT+TOT PNL SERPL-MCNC: 0.3 MG/DL (ref 0–0.8)
BUN SERPL-MCNC: 16.1 MG/DL (ref 9.8–20.1)
CALCIUM SERPL-MCNC: 9.7 MG/DL (ref 8.7–10.5)
CHLORIDE SERPL-SCNC: 106 MMOL/L (ref 98–107)
CO2 SERPL-SCNC: 26 MMOL/L (ref 22–29)
COLOR UR: ABNORMAL
CREAT SERPL-MCNC: 1.79 MG/DL (ref 0.55–1.02)
CREAT UR-MCNC: 84.1 MG/DL (ref 45–106)
EOSINOPHIL # BLD AUTO: 0.3 X10(3)/MCL (ref 0–0.9)
EOSINOPHIL NFR BLD AUTO: 4 %
ERYTHROCYTE [DISTWIDTH] IN BLOOD BY AUTOMATED COUNT: 15.5 % (ref 11.5–14.5)
EST CREAT CLEARANCE SER (OHS): 30.06 ML/MIN
GLOBULIN SER-MCNC: 3.7 GM/DL (ref 2.4–3.5)
GLUCOSE (UA): NEGATIVE
GLUCOSE SERPL-MCNC: 113 MG/DL (ref 74–100)
HBV SURFACE AB SER-ACNC: 0.87 MIU/ML
HBV SURFACE AB SERPL IA-ACNC: NONREACTIVE M[IU]/ML
HBV SURFACE AG SERPL QL IA: REACTIVE
HBV SURFACE AG SERPLBLD QL IA.RAPID: ABNORMAL
HCT VFR BLD AUTO: 41.4 % (ref 35–46)
HGB BLD-MCNC: 13.7 GM/DL (ref 12–16)
HGB UR QL STRIP: NEGATIVE
HYALINE CASTS #/AREA URNS LPF: ABNORMAL /LPF
IMM GRANULOCYTES # BLD AUTO: 0.02 10*3/UL
IMM GRANULOCYTES NFR BLD AUTO: 0 %
INR PPP: 0.93 (ref 0.9–1.2)
KETONES UR QL STRIP: NEGATIVE
LEUKOCYTE ESTERASE UR QL STRIP: NEGATIVE
LYMPHOCYTES # BLD AUTO: 2 X10(3)/MCL (ref 0.6–4.6)
LYMPHOCYTES NFR BLD AUTO: 28 %
MCH RBC QN AUTO: 26.5 PG (ref 26–34)
MCHC RBC AUTO-ENTMCNC: 33.1 GM/DL (ref 31–37)
MCV RBC AUTO: 80.1 FL (ref 80–100)
MONOCYTES # BLD AUTO: 0.8 X10(3)/MCL (ref 0.1–1.3)
MONOCYTES NFR BLD AUTO: 12 %
NEUTROPHILS # BLD AUTO: 3.99 X10(3)/MCL (ref 2.1–9.2)
NEUTROPHILS NFR BLD AUTO: 56 %
NITRITE UR QL STRIP: NEGATIVE
NRBC BLD AUTO-RTO: 0 % (ref 0–0.2)
PH UR STRIP: 6.5 [PH] (ref 4.5–8)
PLATELET # BLD AUTO: 204 X10(3)/MCL (ref 130–400)
PMV BLD AUTO: 10.6 FL (ref 7.4–10.4)
POTASSIUM SERPL-SCNC: 3.9 MMOL/L (ref 3.5–5.1)
PROT SERPL-MCNC: 7.3 GM/DL (ref 6.4–8.3)
PROT UR QL STRIP: 50 MG/DL
PROT UR STRIP-MCNC: 55 MG/DL
PROT/CREAT UR-RTO: 654 MG/GM CR
PROTHROMBIN TIME: 12.3 SECOND(S) (ref 11.9–14.4)
RBC # BLD AUTO: 5.17 X10(6)/MCL (ref 4–5.2)
RBC #/AREA URNS AUTO: ABNORMAL /HPF
SODIUM SERPL-SCNC: 140 MMOL/L (ref 136–145)
SP GR UR STRIP: 1.01 (ref 1–1.03)
SQUAMOUS #/AREA URNS LPF: ABNORMAL /LPF
UROBILINOGEN UR STRIP-ACNC: NORMAL
WBC # SPEC AUTO: 7.2 X10(3)/MCL (ref 4.5–11)
WBC #/AREA URNS AUTO: ABNORMAL /HPF

## 2021-11-23 ENCOUNTER — HISTORICAL (OUTPATIENT)
Dept: RADIOLOGY | Facility: HOSPITAL | Age: 51
End: 2021-11-23

## 2022-02-04 ENCOUNTER — HISTORICAL (OUTPATIENT)
Dept: NEPHROLOGY | Facility: CLINIC | Age: 52
End: 2022-02-04

## 2022-02-04 LAB
ALBUMIN SERPL-MCNC: 3.6 G/DL (ref 3.5–5)
ALBUMIN/GLOB SERPL: 1 {RATIO} (ref 1.1–2)
ALP SERPL-CCNC: 76 U/L (ref 40–150)
ALT SERPL-CCNC: 13 U/L (ref 0–55)
APPEARANCE, UA: CLEAR
AST SERPL-CCNC: 15 U/L (ref 5–34)
BACTERIA SPEC CULT: NORMAL
BILIRUB SERPL-MCNC: 0.6 MG/DL
BILIRUB UR QL STRIP: NEGATIVE
BILIRUBIN DIRECT+TOT PNL SERPL-MCNC: 0.2 (ref 0–0.5)
BILIRUBIN DIRECT+TOT PNL SERPL-MCNC: 0.4 (ref 0–0.8)
BUN SERPL-MCNC: 20 MG/DL (ref 9.8–20.1)
CALCIUM SERPL-MCNC: 10.1 MG/DL (ref 8.7–10.5)
CHLORIDE SERPL-SCNC: 100 MMOL/L (ref 98–107)
CO2 SERPL-SCNC: 28 MMOL/L (ref 22–29)
COLOR UR: NORMAL
CREAT SERPL-MCNC: 1.63 MG/DL (ref 0.55–1.02)
CREAT UR-MCNC: 79.5 MG/DL (ref 45–106)
GLOBULIN SER-MCNC: 3.6 G/DL (ref 2.4–3.5)
GLUCOSE (UA): NORMAL
GLUCOSE SERPL-MCNC: 125 MG/DL (ref 74–100)
HGB UR QL STRIP: NEGATIVE
HYALINE CASTS #/AREA URNS LPF: NORMAL /[LPF]
ICTERIC INTERF INDEX SERPL-ACNC: 0
KETONES UR QL STRIP: NEGATIVE
LEUKOCYTE ESTERASE UR QL STRIP: NEGATIVE
LIPEMIC INTERF INDEX SERPL-ACNC: 5
NITRITE UR QL STRIP: NEGATIVE
PH UR STRIP: 6 [PH] (ref 4.5–8)
POTASSIUM SERPL-SCNC: 3.9 MMOL/L (ref 3.5–5.1)
PROT SERPL-MCNC: 7.2 G/DL (ref 6.4–8.3)
PROT UR QL STRIP: NORMAL
PROT UR STRIP-MCNC: 48 MG/DL
PROT/CREAT UR-RTO: 603.8
RBC #/AREA URNS HPF: NORMAL /[HPF] (ref 0–5)
SODIUM SERPL-SCNC: 137 MMOL/L (ref 136–145)
SP GR UR STRIP: 1.01 (ref 1–1.03)
SQUAMOUS EPITHELIAL, UA: NORMAL
URATE SERPL-MCNC: 7.7 MG/DL (ref 2.6–6)
UROBILINOGEN UR STRIP-ACNC: NORMAL
WBC #/AREA URNS HPF: NORMAL /[HPF] (ref 0–5)

## 2022-04-10 ENCOUNTER — HISTORICAL (OUTPATIENT)
Dept: ADMINISTRATIVE | Facility: HOSPITAL | Age: 52
End: 2022-04-10
Payer: COMMERCIAL

## 2022-04-27 VITALS
BODY MASS INDEX: 48.82 KG/M2 | HEIGHT: 63 IN | WEIGHT: 275.56 LBS | OXYGEN SATURATION: 98 % | SYSTOLIC BLOOD PRESSURE: 154 MMHG | DIASTOLIC BLOOD PRESSURE: 89 MMHG

## 2022-04-30 NOTE — PROGRESS NOTES
Patient:   Courtney Easley            MRN: [id number]            FIN: [id number]               Age:   48 years     Sex:  Female     :  1970   Associated Diagnoses:   None   Author:   Gayathri Covarrubias MD      Pre-Op Dx:  48 BF P4 with AUB, dysmenorrhea        Plan:  TVH BS cysto    Reviewed resident's H&P.  Agree with plan.  Proceed with case

## 2022-04-30 NOTE — OP NOTE
Patient:   Courtney Easley            MRN: [id number]            FIN: [id number]               Age:   48 years     Sex:  Female     :  1970   Associated Diagnoses:   None   Author:   Melisa Pérez MD      OPERATION DATE: 2018  PREOPERATIVE DIAGNOSIS: AUB-L, Dysmenorrhea  POSTOPERATIVE DIAGNOSIS: same  OPERATIONS: Total Vaginal Hysterectomy, Right Salpingectomy, Modified Hollis's, Cystoscopy  SURGEON: Melisa Pérez MD  ATTENDING: Gayathri Covarrubias MD   ASSISTANT: Enrike Tony MD; Denise Leach MD  ANESTHESIA: General endotracheal  EBL: 100 cc  IVF: 1400 cc  UOP: 300 cc  COMPLICATIONS: none  FINDINGS:  EUA: 12 weeks size mobile uterus with good mobility descent. Adequate vaginal capacity. No adnexal masses.    Intraop: 12 week size uterus. Cervical elongation. Normal bilateral ovaries and right fallopian tubes; left fallopian tube not visualized.   Cystoscopy: Brisk efflux of urine from bilateral ureteral orifices. No evidence of bladder injury, masses, or suture material.     PROCEDURE IN DETAIL:   Patient was taken to the OR with IV fluids running. General anesthesia was obtained without difficulty. 2g of Ancef were given for infection prophylaxis. SCDs were placed on bilateral lower extremitities for DVT prophylaxis. She was positioned in dorsal lithotomy position with feet in Sudarshan stirrups. Exam under anesthesia was performed. She was prepared in draped in the normal sterile fashion. A time out was performed.  A weighted speculum was placed in the vagina, and a right angle retractor was used to visualize the cervix. The cervix was grasped with two thyroid tenaculums. The cervical-vaginal junction was infiltrated with dilute pitressin. An anterior incision was made along the vagina with the Bovie and extended circumferentially to include the posterior and lateral cervix. The pubocervical fascia was dissected bluntly with a Raytec. An Allis clamp was placed on the posterior  cul-de-sac and curved Garcia scissors were used to make an incision to enter the peritoneal cavity. The peritoneum was then sutured to the posterior vaginal epithelium, a weighted speculum placed within the peritoneum, and the sutures placed around the speculum with a hemostat to hold it in place. The left uterosacral ligament was clamped with a curved Clarence clamp, cut with the curved Garcia scissors, suture ligated with 0 vicryl suture, and tagged with a curved hemostat. The same was then performed at the right uterosacral ligament.   Attention was turned to the anterior cervix. The peritoneal reflection was identified, but multiple attempts at entry were with the Metzenbaum scissors were not successful. Decision was made to proceed with dissection along the cardinal ligaments. The left cardinal ligament was serially clamped, cut, and suture ligated. The right cardinal ligament was similarly transected and suture-ligated. After approximately 6 cm of dissection along the cervix, the level of the internal os was reached. Again the anterior cervix was inspected and the peritoneal reflection identified, grasped, and entered sharply with the metzenbaum scissors. Intraperitoneal location was confirmed by palpation and by visualization fo the omentum. The uterine vessels on the left were clamped, cut, and suture ligated. The same was then performed on the right. Dissection was continued along the broad ligaments using the handheld Enseal device until the uterus could not descend any further through the vagina, but the fundus was not yet palpable. At this time, the uterus was morcellated using the coring technique, keeping the cervix and endometrium intact, until the uterine fundus was palpable and visible. As the uterus was being manipulated to place a Clarence clamp along the left uterine cornu, the remaining broad ligament was disrupted and . The tissue pedicle was inspected, and hemostasis was achieved with the  Enseal. At the right uterine cornu, the remaining broad ligament and the fallopian tube and utero-ovarian ligament insertions into the uterus were clamped, coagulated, and transected with the Enseal. The uterus was then removed. The pedicle was noted to be hemostatic. The left ovary was in clear view, was inspected and found to be normal. The left fallopian tube was unable to be identified. The right fallopian tube was identified and grasped with a Lambert clamp, then clamped, coagulated, and transected with the Enseal. The right ovary was normal in appearance. The pedicles were re-inspected and noted to be normal.   The hidalgo needle was then used to attach the 0 vicryl suture, which had previously been placed at the left uterosacral ligament, to the posterior aspect of the left vaginal cuff apex. This was repeated with the other end of the 0 vicryl suture. The ends of the suture were then tagged with a curved hemostat. The suture at the right uterosacral ligament was attached to the right vaginal cuff apex in the same manner.   The vaginal cuff was closed with 2-0 vicryl suture in a multiple figures of eight, taking care to include the edges of the peritoneum in each throw. The Hollis sutures at the uterosacral ligaments were then tied, with elevation of the lateral edges of the vaginal cuff noted.   The mayorga catheter was removed. Cystoscopy was then performed, with bilateral flow noted from both ureteral orifices. The bladder was noted to be free of injury, masses, or suture material.   The patient tolerated the procedure well. All counts were correct x2. The patient was taken to the recovery room in a stable condition. Dr. Covarrubias was present and scrubbed for entirety of the procedure.

## 2022-05-03 NOTE — HISTORICAL [facility] CERNER
This is a historical note converted from Delmy. Formatting and pictures may have been removed.  Please reference Delmy for original formatting and attached multimedia. Chief Complaint  hep b f/u  History of Present Illness  49 y/o female with PMH chronic hepatitis B, CKD III, HTN, sickle cell trait, LVH, gout, MGUS?who presents to ID clinic today for follow-up.  Last clinic visit?March 2021.  She was referred here for hepatitis B treatment. ?According to the patient she was diagnosed with hepatitis B roughly 30 years ago, was screened while she was pregnant. ?Was never offered treatment and referred to infectious disease physician in the past. ?She had been followed by a PCP in [place] who was monitoring her hepatitis, now she has transferred care here to [facility] and [place] [facility] [place]ph[place] had ordered workup which showed HepB core Ab+, Hep Bs Ag+, Hep Be ab+ with negative antigen. She denies ever having jaundice, abdominal pain or RUQ, or feeling very ill. She is unsure if any family members have had Hep B, denies any h/o blood transfusions, IVDU. She has no complaints today. Denies chest pain, SOB, fever, chills, N/V/D.  Of note patient was recently diagnosed with MGUS follows oncology with Dr. Mcintosh, plans of doing IR guided biopsy?of bone marrow,?PET scan.  Review of Systems  Consider 12-point ROS negative unless indicated otherwise above  Physical Exam  Vitals & Measurements  T:?36.9? ?C (Oral)? HR:?92(Peripheral)? RR:?18? BP:?154/89?  HT:?160.00?cm? WT:?125.000?kg? BMI:?48.83?  General: Alert and oriented X3, Not in?acute distress.  Eye: PERRLA EOMI.  HENT: Normocephalic. Atraumatic.  Neck: Supple, Non-tender, No carotid bruit, No lymphadenopathy.  Respiratory: Lungs are clear to auscultation, Respirations are non-labored, Breath sounds are equal, Symmetrical chest wall expansion.  Cardiovascular: Normal rate, Regular rhythm, No murmur/gallops.  Gastrointestinal: Soft, Non-tender, Non-distended, Normal  bowel sounds.  Musculoskeletal: Moves all extremities purposefully.  Extremities: Equal pulses felt bilaterally in all extremities. No peripheral edema.  Integumentary: Warm, Dry, Intact  Neurologic: Responds to questions appropriately.  Assessment/Plan  1) Chronic inactive?hepatitis B  - Dx roughly 30 years ago, treatment naive  - HepB core Ab+, Hep Bs Ag+, Hep Bs Ab -, ?Hep Be ab+, Be Ag negative.   - Noted no elevation in LFTs or T bili. No previous dx of cirrhosis. No evidence of necroinflammation  -FibroScan? today on 11/2021 with scoring F1 F2, mild fibrosis,?last Ultrasound of the?abdomen in 5/2021 with hepatomegaly no cirrhosis.  -No indication for treatment at this time.? Instructed patient to inform us with any plans of immunosuppressive therapy?as this would be?an indication for treatment.  -Repeating labs,?HBV DNA quantified on, ultrasound liver?prior to next visit  ?  ?   - No indication for treatment at this time. Instructed patient to inform us of any plans for immunosuppressive therapy as this would be an indication for treatment  - Repeating Hep labs prior to next viist  ?   2) CKD stage III  - Continue to follow with nephrology clinic  ?   3) HTN  -Slightly elevated?today.  - Continue Norvasc, HCTZ, irbesartan  ?   RTC 6 months with labs and RUQ US  ?   ID ATTENDING ADDENDUM  Patient seen and examined with resident. ?Agree with documented physical exam. ?Treatment plan reviewed and discussed with resident and is reasonable and appropriate.???  ?   Overall, continues to do well from a hepatitis B perspective, reviewed fibroscan today, F2 disease, no acute indication to treat, VL remains <2000 and no evidence of necroinflammation (lfts normal), eAb positive.? Hep A vax #1 today.? RTC with me in 6 months.? Needs HCC screening arranged, which we will do.?  ?  Abdoulaye WOOD  ID Staff  Referrals  Clinic Follow up, *Est. 05/05/22 13:15:00 CDT, Order for future visit, Chronic hepatitis B, [facility]   Problem List/Past Medical History  Ongoing  Allergic rhinitis, seasonal  Anemia  Chronic hepatitis B  CKD (chronic kidney disease) stage 3, GFR 30-59 ml/min  Dysmenorrhea  Environmental allergies  Hyperglycemia  Hypertension  Menorrhalgia  Morbid obesity  Morbid obesity  Sickle cell trait  Historical  Pregnant  Pregnant  Pregnant  Pregnant  Procedure/Surgical History  Cystoscopy (None) (07/19/2018)  Cystourethroscopy (separate procedure) (07/19/2018)  Hysterectomy Vaginal (None) (07/19/2018)  Inspection of Bladder, Via Natural or Artificial Opening Endoscopic (07/19/2018)  Repair Cul-de-sac, Open Approach (07/19/2018)  Resection of Right Fallopian Tube, Via Natural or Artificial Opening (07/19/2018)  Resection of Uterus, Via Natural or Artificial Opening (07/19/2018)  Salpingectomy (Bilateral) (07/19/2018)  Vaginal hysterectomy, for uterus 250 g or less; with removal of tube(s), and/or ovary(s), with repair of enterocele (07/19/2018)  Tubal ligation (1993)   Medications  allopurinol 100 mg oral tablet, See Instructions  amlodipine 10 mg oral tablet, 10 mg= 1 tab(s), Oral, Daily, 3 refills  aspirin 81 mg oral Delayed Release (EC) tablet, 81 mg= 1 tab(s), Oral, Daily, 3 refills  Blood pressure monitor with large adult cuff, See Instructions  Caltrate, 1200 mg, Oral, Daily  docusate sodium 100 mg oral capsule, 100 mg= 1 cap(s), Oral, Daily  hydrALAZINE 25 mg oral tablet, 25 mg= 1 tab(s), Oral, BID, 3 refills  hydrochlorothiazide-irbesartan 12.5 mg-150 mg oral tablet, See Instructions  Iron-150 oral tablet, 1 tab(s), Oral, Daily  levocetirizine 5 mg oral tablet, 5 mg= 1 tab(s), Oral, qPM, 3 refills  metoprolol tartrate 25 mg oral tab, 25 mg= 1 tab(s), Oral, BID, 3 refills  Vitamin B12  Allergies  No Known Allergies  Family History  Aneurysm: Sister.  Aneurysm: Sister.  Diabetes mellitus type 2: Sister.  HTN (hypertension).......: Father.  Heart disease: Brother.  Hypertension.: Sister.  Stomach cancer.....:  Mother.  Thyroid: Sister.  Immunizations  Vaccine Date Status   hepatitis A adult vaccine 11/04/2021 Given   influenza virus vaccine, inactivated 11/04/2021 Given   COVID-19 mRNA, LNP-S, PF - Moderna 03/31/2021 Recorded   COVID-19 mRNA, LNP-S, PF - Moderna 03/03/2021 Recorded   COVID-19 mRNA, LNP-S, PF - Moderna 02/26/2021 Recorded   influenza virus vaccine, inactivated 10/29/2020 Given   zoster vaccine, inactivated 09/29/2020 Given   zoster vaccine, inactivated 07/29/2020 Given   tetanus/diphtheria/pertussis, acel(Tdap) 07/29/2020 Given   tetanus/diphtheria/pertussis, acel(Tdap) 03/05/2013 Recorded   hepatitis B adult vaccine 03/05/2013 Recorded   influenza virus vaccine, inactivated 02/06/2013 Recorded   hepatitis B adult vaccine 02/06/2013 Recorded   tetanus-diphtheria toxoids 03/24/1984 Recorded   mumps virus vaccine 01/17/1980 Recorded   poliovirus vaccine, live, trivalent 04/04/1974 Recorded   rubella virus vaccine 04/01/1971 Recorded   poliovirus vaccine, live, trivalent 08/14/1969 Recorded   measles virus vaccine 05/13/1969 Recorded   poliovirus vaccine, live, trivalent 1970 Recorded   poliovirus vaccine, live, trivalent 1970 Recorded

## 2022-05-03 NOTE — HISTORICAL [facility] CERNER
This is a historical note converted from Cerner. Formatting and pictures may have been removed.  Please reference [employer]ner for original formatting and attached multimedia. H&P reviewed, no interval changes noted. All questions answered, patient desired to proceed with case.  Vitals: T 98.60, P 69, RR 20, /93  Labs: K 4, HCG neg, H/H 13/40, O+/-, Hep B SAg +, Hep B e Ab +, HgA1c 5.7  ?  ?Chief Complaint  pre op  History of Present Illness  48  with history of menorrhagia and dysmenorrhea presents for pre operative counseling for hysterectomy. No complaints today.  ?   OBHx:  x 4, BB 7lb, 15 oz  GYNhx:?hep B,?denies abnormal paps.   Review of Systems  Constitutional: No fever, No chills.  Respiratory: No shortness of breath.  Cardiovascular: No chest pain, No syncope.  Gastrointestinal: No nausea, No vomiting, No diarrhea, No constipation.  Genitourinary: No dysuria, No hematuria, No abnormal discharge or bleeding.  ?   ?  Physical Exam  ???Vitals & Measurements  ??T:?36.4? ?C (Oral)? HR:?90(Peripheral)? RR:?20? BP:?139/80? SpO2:?98%? WT:?111?kg? WT:?111?kg?  General Appearance: Alert, cooperative, no distress,  Lungs: Clear to auscultation bilaterally, respirations unlabored  Heart: Regular rate and rhythm, S1 and S2 normal, no murmur, rub or gallop  Abdomen: Soft, non-tender, bowel sounds active all four quadrants, no masses, no organomegaly. well healed pfannesteil incision  Genitalia:  - External genitalia: Normal without lesions  - Urethral meatus: Normal  - Bladder: No suprapubic tenderness  - bimanual: good capacity, 12 wk sized mobile uterus, no adnexal masses or fullness  - speculum: multiparous cervix, old blood in vault  - Adnexa/parametria: No fullness or masses  - Anus/perineum: Intact without lesions or hemorrhoids  Extremities: Extremities normal, atraumatic, no cyanosis or edema  Skin: Skin turgor normal, no rashes or lesions.  ?  ?  ?  ?  (2018 09:44 CDT US Transvaginal  Non-OB)  Radiology Report  US Transvaginal Non-OB, US Pelvic Non-Obstetrics  ?  REASON FOR STUDY: R10.2, Pelvic and perineal pain  ?  TECHNIQUE: Transabdominal and endovaginal ultrasound of the pelvis.?  ?  COMPARISON: No relevant comparison studies available at the time of  dictation.?  ?  FINDINGS: The uterus measures 12.5 x 6.1 x 5.4 cm on the  transabdominal images. The endometrium measures 1.3 cm. Few  subcentimeter nabothian cysts. Mildly hypoechoic area along the  anterior mid uterus measures 1.7 x 1.3 x 1.6 cm. There is an  additional mildly hypoechoic area in the posterior myometrium  measuring 2.5 x 2.1 x 2.3 cm.  ?  The right ovary is not identified. The left ovary is only seen  transabdominally. It measures 3.8 x 3.3 x 3.1 cm. It contains a 2 cm  dominant follicle.?  ?  No pelvic free fluid. ?  ?  IMPRESSION:?  1. 2 intramural fibroids, measuring up to 2.5 cm.  2. Right ovary not identified.  ?  ? [1]  ?  Pap 5/3/2019  NILM  ?  EMB 2018  Endometrium, Biopsy:  - Multiple fragments of benign endocervical tissue and lower uterine segment.  ?  ?  Assessment/Plan  ?  48  with menorrhagia and dysmenorrhea presents for pre operative counseling for Total Vaginal Hysterectomy, bilateral salpingectomy, and cystoscopy and possible LAVH.  ?  Counseling:? Alternatives to this planned procedure were explained to the patient, including medical and other surgical management.? This procedure and its risks, benefits, complications (including injury to bowel, bladder, major blood vessel, ureter, bleeding, possibility of transfusion, infection, scarring, dyspareunia, erosion, further surgery, incontinence, failure of the procedure, or fistula formation).? Additional risks specific to this patient and procedure include need for conversion to laparotomy.? Patient counseled on risk of blood transfusion including but not limited to allergic reaction, transmission of HIV, Hep C 1:2 million, transmission of Hep B  1:250,000  ?  Pre op labs ordered: CBC, BMP, EKG, Chest Xray, HIV, Hep panel, Syphillis Ab  ?  Continue metoprolol on the AM of surgery  Will request medical clearance from patients PCP Dr. Justin Carson  ?  To OR on 7/12 for Total Vaginal Hysterectomy and bilateral salpingectomy, Possible LAVH.  Post Op appointment 7/27/2018 Problem List/Past Medical History  ??Ongoing  ??Environmental allergies  ??Hypertension  ??Morbid obesity  ?Historical  ??No qualifying data  Hepatitis B  Sickle Cell Trait  Procedure/Surgical History  ?  Ex-lap for BTL in 1993  Medications  ?Home  ??AMLODIPINE TAB 5MG  ??Caltrate, 1200 mg, Oral, Daily  ??CLONIDINE TAB 0.1MG  ??Fergon 240 mg (27 mg elemental iron) oral tablet, 240 mg= 1 tab(s), Oral, Daily  ??IRBESAR/HCTZ -12.5  ??LEVOCETIRIZI TAB 5MG  ??METOPROL TAR TAB 50MG, 50 mg= 1 tab(s), Oral, BID  ??Vitamin B12  Allergies  No Known Allergies  Social History  ??Alcohol  ???Current, Beer, 11/04/2016  ??Employment/School  ???Employed, 11/04/2016  ??Exercise  ???Exercise frequency: 1-2 times/week. Exercise type: Walking., 05/25/2018  ??Home/Environment  ???Lives with Children. Living situation: Home/Independent., 05/25/2018  ??Nutrition/Health  ???Regular, 11/04/2016  ??Sexual  ???Sexually active: No., 05/25/2018  ??Substance Abuse  ???Never, 11/04/2016  ??Tobacco  ???Never smoker, 11/04/2016  Family History  ??Aneurysm: Sister.  ??Diabetes mellitus type 2: Sister and Brother.  ??HTN (hypertension) 09-AUG-2016 15:31:24<$>: Father.  ??Hypertension.: Sister.  ??Stomach cancer 07-JUN-2017 17:52:07<$>: Mother.  ??Thyroid: Sister.  Lab Results  ?Test Name ?Test Result ?Date/Time   ?Sodium Lvl 139 mmol/L 03/26/2018 13:00 CDT   ?Potassium Lvl 4.5 mmol/L 03/26/2018 13:00 CDT   ?BUN 10 mg/dL 03/26/2018 13:00 CDT   ?Creatinine 1.60 mg/dL (High) 03/26/2018 13:00 CDT   ?Hgb A1c 5.7 % 05/25/2018 10:20 CDT   ?TSH 0.782 mIU/L 05/25/2018 10:20 CDT   ?WBC 7.4 x10(3)/mcL 05/25/2018 10:20 CDT   ?RBC  5.07 x10(6)/mcL 05/25/2018 10:20 CDT   ?Hgb 13.7 gm/dL 05/25/2018 10:20 CDT   ?Hct 40.4 % 05/25/2018 10:20 CDT   ?Platelet 195 x10(3)/mcL 05/25/2018 10:20 CDT   ?MCV 79.7 fL (Low) 05/25/2018 10:20 CDT      ?  [1]?US Transvaginal Non-OB; Aubrey WOOD, Shaun C 04/25/2018 09:44 CDT  Addendum by Samuel WOOD, May S on June 28, 2018 14:16:46 CDT (Verified)  I have seen this patient and agree with note above.  *Medical Clearance requested-- I sent a note to Dr. Carson asking for clearance.? She has several conditions that need optimization including: renal insufficiency/ HTN, and we also discussed risk of alcohol withdrawal.? I anticipate she will be overnight stay, but risks are greater if she has to stay over 2 nights or longer.  *Plan to start with TVH plan, but she is agreeable if laparoscopic assistance is needed secondary to adhesions or other.  ?  Informed consent obtained, specifically I reiterated the risks of: pain, infection, bleeding, need for transfusion, renal or heart problems, seizures, need for laparotomy, damage to nearby organs and need for repair or additional surgery, delayed complications,?or other unpredicted risks.  Patient able to describe planned procedure in her own words.  Expected recovery time reviewed, as well as normal postoperative course.  ?

## 2022-05-03 NOTE — HISTORICAL [facility] CERNER
This is a historical note converted from Delmy. Formatting and pictures may have been removed.  Please reference Delmy for original formatting and attached multimedia. Admission Information  48 BF POD#1 ?s/p TVH right salpingectomy cysto doing well this morning.? Tolerating po.? Only needed one narcotic overnight.? Amb and voiding well  Hospital Course  Procedures and Treatment Provided  TVH right salpingectomy cysto  Physical Exam  Vitals & Measurements  T:?37.1? ?C (Oral)? TMIN:?36? ?C (Temporal Artery)? TMAX:?37.6? ?C (Oral)? HR:?70(Peripheral)? RR:?18? BP:?135/73? SpO2:?99%? WT:?110.3?kg?  Abd: soft NTND  Ext: no edema nor tenderness to palpation  Discharge Plan  Discharge to home.? Pelvic rest x 6 weeks.? Proper nutrition and bowel regimen discussed.  Cr elevated but stable.? She is known to have renal disfunction being followed by outside provider  Patient Discharge Condition  stable  Discharge Disposition  Follow up appt in 2 weeks

## 2022-05-03 NOTE — HISTORICAL [facility] CERNER
This is a historical note converted from Delmy. Formatting and pictures may have been removed.  Please reference Cerlarry for original formatting and attached multimedia. Chief Complaint  F/U HTN  History of Present Illness  50 Years-year-old female with a past medical history of HTN, sickle cell trait, anemia, CKD3,? and LVH (documented in former PCP notes).? Patient states she has been adhering to a low-sodium?eating habit.??  ?   HTN:?/82. ?Patient is currently on?Amlodipine 10 mg p.o. daily, irbesartan-HCTZ 150/12.5 p.o. daily, and metoprolol tartrate 25 mg p.o. twice daily.??? Patient denies chest pain, shortness of breath, PND, orthopnea, headache, blurred vision or peripheral edema.?  ?   Hyperglycemia: Hemoglobin A1c (7/9/2020): 6.3.? Pt adhering to low carbohydrate eating habit. Pt has lost 10 lbs.  ?   CKD3: GFR (9/29/2020): 38 with BUN/creatinine 28/1.8. ?Pt is avoiding NSAIDs.Nephrology referral submitted 8/31/2020; appointment 10/29/2020 at 0845.  ?  Reviewed labs from 9/29/2020 with the patient  GFR (9/29/2020): 38 with BUN/creatinine 28/1.8  Urine protein/creatinine 1073  ?  Patient denies chest pain, shortness of breath,?dyspnea on exertion, palpitations, peripheral edema,?abdominal pain, nausea, vomiting,?diarrhea,?constipation, fatigue, fever, chills,?dysuria,? hematuria, melena,?or hematochezia.  ?   Preventative:  MMG scheduled for? 12/2020  Review of Systems  GENERAL:?Negative for abnormal weight loss or weight gain, fatigue, fever, or chills. Negative except for stated in HPI.  HEENT:??Negative for head trauma,?blurred vision, rhinorrhea, ?nasal congestion, sore throat or hearing deficit. Negative except for stated in HPI.  CARDIOVASCULAR: Negative for? chest pain, palpitations, MARQUEZ, PND, orthopnea, peripheral edema or syncope. Negative except for stated in HPI.  RESPIRATORY: Negative for SOB, MARQUEZ, cough or?wheezing.? Negative except for stated in HPI.  GASTROINTESTINAL: Negative for  abdominal pain, nausea,??vomiting, diarrhea, constipation, heartburn, ?hematochezia? or melena.? Negative except for stated in HPI.  GENITOURINARY: Negative for dysuria, hematuria, urinary frequency, urinary urgency, urinary hesitancy or flank pain. Negative except for stated in HPI.  ENDOCRINE: Negative for fatigue, heat intolerance, cold intolerance, polyuria, polydipsia, or polyphagia.? Negative except for stated in HPI.  PSYCHOLOGICAL: Negative for depression, anxiety, suicidal or homicidal ideations, hallucinations??or?jamar.? Negative except for?stated in HPI.  MUSCULOSKELETAL: Negative for myalgia, joint pain, or joint swelling.? Negative except for?stated in HPI.  INTEGUMENT: Negative for rash?or lesions. Negative except for stated in HPI.  NEUROLOGY: Negative for headaches, dizziness, numbness, tingling,?vertigo?or gait disturbances. Negative except for stated in HPI.  ?  Rest of 12 point ROS is negative except for HPI.  ?  Physical Exam  Vitals & Measurements  T:?36.8? ?C (Oral)? HR:?89(Peripheral)? RR:?20? BP:?129/82? SpO2:?98%?  HT:?160.00?cm? WT:?113.500?kg? BMI:?44.34?  GENERAL:?Alert and oriented to person, place, time and situation,?NAD  HEENT: NCAT, Pupils equally round and reactive to light accommodation,?normal sclera, ?moist mucous membranes,?oropharynx normal,?normal nasal turbinates/ nares,??TM clear bilaterally, neck?supple,?thyroid normal,?no lymphadenopathy.  CARDIOVASCULAR:?Regular rateand?rhythm,? S1 and S2 normal;?no murmurs, no rubs, or no gallops; no carotid bruit.  RESPIRATORY:??Lungs clear to auscultation bilaterally;?no wheezes,?no rhonchi,?or? no crackles  ABDOMEN: Soft/ Nontender/ Nondistended; Bowel Sounds x4 - normoactive; no abdominal pulsatile mass, no masses, no hernias  EXTREMITY:? No clubbing, no cyanosis and?no peripheral edema; Dorsalis pedis and tibial ?pulses 2+  MUSCULOSKELETAL: FROM of Upper and Lower extremities; Strength 5/5 of Upper and Lower  extremities  PSYCHOLOGICAL: Good judgement and insight; normal affect and mood.  NEUROLOGICAL: Normal gait and ?normal sensation;?no focal deficits; Cranial Nerves 2 -12 grossly intact  ?  Assessment/Plan  1.?Hypertension?I10  Chronic issue/ uncontrolled  Goal BP <140/90  Continue to?monitor BP daily and bring in log at next visit  Continue low sodium eating habit of ?less than 2 grams/ day/Dash eating habit  Encourage exercise for 30 minutes 5 days/ week (total 150 minutes/week)  Continue amlodipine 10 mg po daily  Continue ?irbesartan/HCTZ?150/12.5?mg p.o. daily  Continue metoprolol tartrate 25 mg p.o.?twice daily  Continue hydralazine 25 mg po BID  ?Refills given  ?  Ordered:  amLODIPine, 10 mg = 1 tab(s), Oral, Daily, # 90 tab(s), 1 Refill(s), Pharmacy: [facility]/pharmacy #[id number], 160, cm, Height/Length Dosing, 10/12/20 8:55:00 CDT, 113.5, kg, Weight Dosing, 10/12/20 8:55:00 CDT  aspirin, 81 mg = 1 tab(s), Oral, Daily, # 30 tab(s), 5 Refill(s), Pharmacy: [facility]/pharmacy #[id number], 160, cm, Height/Length Dosing, 10/12/20 8:55:00 CDT, 113.5, kg, Weight Dosing, 10/12/20 8:55:00 CDT  hydrALAZINE, See Instructions, TAKE 1 TAB TWICE DAILY HOLD FOR BLOOD PRESSURE LESS THAN 100/ 50, # 60 tab(s), 1 Refill(s), Pharmacy: [facility]/pharmacy #[id number], 160, cm, Height/Length Dosing, 10/12/20 8:55:00 CDT, 113.5, kg, Weight Dosing, 10/12/20 8:55:00 CDT  hydrochlorothiazide-irbesartan, 1 tab(s), Oral, Daily, # 30 tab(s), 5 Refill(s), Pharmacy: [facility]/pharmacy #[id number], 160, cm, Height/Length Dosing, 10/12/20 8:55:00 CDT, 113.5, kg, Weight Dosing, 10/12/20 8:55:00 CDT  ?  2.?CKD (chronic kidney disease) stage 3, GFR 30-59 ml/min?N18.3  Chronic issue  GFR (9/29/2020): 38 with BUN/creatinine 28/1.8  Follow low sodium diet (less than 2 grams a day)  Control diabetes (goal A1C <7.0%)  Control high blood pressure ( goal BP < 130/80, please record BP at home every day and bring log to next office visit)  Exercise at least 30 minutes a day, 5 days a week.  Maintain  healthy weight.  Stay well hydrated with water  Receive Pneumovax and Flu? if indicated.  Do not take NSAIDs (Ibuprofen, Naproxen, Aleve, Advil, Toradol, Mobic), may take only Tylenol as needed for pain/headaches.  Take cholesterol-lowering medications as prescribed (LDL goal <100)  Internal referral to [facility]; appointment 10/29/2020 at 0845.  ?  3.?Hyperglycemia?R73.9  Chronic issue  Continue low carbohydrate eating habit?  HgA1c ordered  Ordered:  Hemoglobin A1C [facility], Routine collect, *Est. 10/12/20 3:00:00 CDT, Blood, Order for future visit, *Est. Stop date 10/12/20 3:00:00 CDT, Lab Collect, Hyperglycemia, 10/12/20 9:34:00 CDT  ?  4.?Allergic rhinitis, seasonal?J30.2  ?Chronic issue/ stable; refill given on levocetirizine.  Ordered:  levocetirizine, 5 mg = 1 tab(s), Oral, qPM, # 30 tab(s), 5 Refill(s), Pharmacy: [facility]/pharmacy #[id number], 160, cm, Height/Length Dosing, 10/12/20 8:55:00 CDT, 113.5, kg, Weight Dosing, 10/12/20 8:55:00 CDT  ?  5.?Need for vaccination?Z23  ?Influenza vaccination given.  Ordered:  Influenza Virus Vaccine, Inactivated, 0.5 mL, form: Susp, IM, Once-Unscheduled, first dose 10/12/20 9:32:00 CDT  ?  RTC in 3 months   Problem List/Past Medical History  Ongoing  Allergic rhinitis, seasonal  Anemia  CKD (chronic kidney disease) stage 3, GFR 30-59 ml/min  Dysmenorrhea  Environmental allergies  Hepatitis B  Hyperglycemia  Hypertension  Menorrhalgia  Morbid obesity  Morbid obesity  Sickle cell trait  Historical  Pregnant  Pregnant  Pregnant  Pregnant  Procedure/Surgical History  Cystoscopy (None) (07/19/2018)  Cystourethroscopy (separate procedure) (07/19/2018)  Hysterectomy Vaginal (None) (07/19/2018)  Inspection of Bladder, Via Natural or Artificial Opening Endoscopic (07/19/2018)  Repair Cul-de-sac, Open Approach (07/19/2018)  Resection of Right Fallopian Tube, Via Natural or Artificial Opening (07/19/2018)  Resection of Uterus, Via Natural or Artificial Opening (07/19/2018)  Salpingectomy  (Bilateral) (07/19/2018)  Vaginal hysterectomy, for uterus 250 g or less; with removal of tube(s), and/or ovary(s), with repair of enterocele (07/19/2018)  Tubal ligation (1993)   Medications  amlodipine 10 mg oral tablet, 10 mg= 1 tab(s), Oral, Daily, 1 refills  aspirin 81 mg oral Delayed Release (EC) tablet, 81 mg= 1 tab(s), Oral, Daily, 5 refills  Blood pressure monitor with large adult cuff, See Instructions  Caltrate, 1200 mg, Oral, Daily  CLONIDINE TAB 0.1MG,? ?Not taking  docusate sodium 100 mg oral capsule, 100 mg= 1 cap(s), Oral, Daily  hydrALAZINE 25 mg oral tablet, See Instructions, 1 refills  hydrochlorothiazide-irbesartan 12.5 mg-150 mg oral tablet, 1 tab(s), Oral, Daily, 5 refills  influenza virus vaccine, inactivated quadrivalent intramuscular suspension, 0.5 mL, IM, Once-Unscheduled  Iron-150 oral tablet, 1 tab(s), Oral, Daily  levocetirizine 5 mg oral tablet, 5 mg= 1 tab(s), Oral, qPM, 5 refills  metoprolol tartrate 25 mg oral tab, 25 mg= 1 tab(s), Oral, BID, 5 refills  Vitamin B12  Allergies  No Known Allergies  Social History  Abuse/Neglect  No, No, Yes, 10/12/2020  Alcohol  Current, Beer, 11/04/2016  Employment/School  Employed, 11/04/2016  Exercise  Exercise frequency: 1-2 times/week. Exercise type: Walking., 05/25/2018  Financial/Legal Situation  None, 10/12/2020  Home/Environment  Lives with Children. Living situation: Home/Independent., 05/25/2018    Never in , 08/31/2020  Nutrition/Health  Regular, 11/04/2016  Sexual  Sexually active: No., 05/25/2018  Spiritual/Cultural  Non denomination, 06/23/2020  Substance Use  Never, 11/04/2016  Tobacco  Never (less than 100 in lifetime), N/A, 10/12/2020  Family History  Aneurysm: Sister.  Aneurysm: Sister.  Diabetes mellitus type 2: Sister.  HTN (hypertension).......: Father.  Heart disease: Brother.  Hypertension.: Sister.  Stomach cancer.....: Mother.  Thyroid: Sister.  Immunizations  Vaccine Date Status   zoster vaccine, inactivated  09/29/2020 Given   zoster vaccine, inactivated 07/29/2020 Given   tetanus/diphtheria/pertussis, acel(Tdap) 07/29/2020 Given   hepatitis B adult vaccine 03/05/2013 Recorded   hepatitis B adult vaccine 02/06/2013 Recorded   influenza virus vaccine, inactivated 02/06/2013 Recorded   tetanus-diphtheria toxoids 03/24/1984 Recorded   mumps virus vaccine 01/17/1980 Recorded   rubella virus vaccine 04/01/1971 Recorded   measles virus vaccine 05/13/1969 Recorded   Health Maintenance  Health Maintenance  ???Pending?(in the next year)  ??? ??OverDue  ??? ? ? ?Zoster Vaccine due??and every?  ??? ??Due?  ??? ? ? ?Breast Cancer Screening due??10/12/20??and every?  ??? ??Due In Future?  ??? ? ? ?Obesity Screening not due until??01/01/21??and every 1??year(s)  ??? ? ? ?Alcohol Misuse Screening not due until??01/02/21??and every 1??year(s)  ??? ? ? ?ADL Screening not due until??06/23/21??and every 1??year(s)  ??? ? ? ?Colorectal Screening not due until??08/31/21??and every 1??year(s)  ??? ? ? ?Diabetes Screening not due until??09/29/21??and every 1??year(s)  ??? ? ? ?Hypertension Management-BMP not due until??09/29/21??and every 1??year(s)  ???Satisfied?(in the past 1 year)  ??? ??Satisfied?  ??? ? ? ?ADL Screening on??06/23/20.??Satisfied by Catie Taylor LPN  ??? ? ? ?Alcohol Misuse Screening on??06/23/20.??Satisfied by Catie Taylor LPN  ??? ? ? ?Blood Pressure Screening on??10/12/20.??Satisfied by Catie Taylor LPN  ??? ? ? ?Body Mass Index Check on??10/12/20.??Satisfied by Catie Taylor LPN  ??? ? ? ?Colorectal Screening on??08/31/20.??Satisfied by Davie Veloz  ??? ? ? ?Depression Screening on??10/12/20.??Satisfied by Catie Taylor LPN  ??? ? ? ?Diabetes Screening on??09/29/20.??Satisfied by Sharon Ricketts  ??? ? ? ?Hypertension Management-Blood Pressure on??10/12/20.??Satisfied by Catie Taylor LPN  ??? ? ? ?Influenza Vaccine on??10/12/20.??Satisfied by Ismael WOOD, Anila HILTON  ??? ? ? ?Lipid Screening  on??07/09/20.??Satisfied by Laverne Amaro  ??? ? ? ?Obesity Screening on??10/12/20.??Satisfied by Catie Taylor LPN  ??? ? ? ?Tetanus Vaccine on??07/29/20.??Satisfied by Catie Taylor LPN  ??? ??Postponed?  ??? ? ? ?Tetanus Vaccine on??06/23/20.??Recorded by Anila Guevara MD  ??? ??Refused?  ??? ? ? ?Influenza Vaccine on??10/12/20.??Recorded by Catie Taylor LPN??Reason: Patient Refuses  ??? ? ? ?Tetanus Vaccine on??06/23/20.??Recorded by Catie Taylor LPN??Reason: Patient Refuses  ??? ? ? ?Zoster Vaccine on??08/31/20.??Recorded by Catie Taylor LPN??Reason: Patient Refuses  ?      Pt did not get the influenza vaccination. She left clinic prior to getting the vaccination.l

## 2022-05-03 NOTE — HISTORICAL [facility] CERNER
This is a historical note converted from Cerlarry. Formatting and pictures may have been removed.  Please reference Cerlarry for original formatting and attached multimedia. Chief Complaint  HTN  History of Present Illness  50 Years-year-old female with a past medical history of HTN, sickle cell trait, anemia, CKD3,? and LVH (documented in former PCP notes).? Patient states she has been adhering to a low-sodium?eating habit.??  ?   HTN: Reviewed Home BP log?ranges: 141 -164/ 83 -108.Patient is currently on?Amlodipine 10 mg p.o. daily, irbesartan-HCTZ 150/12.5 p.o. daily, and metoprolol tartrate 25 mg p.o. twice daily.??? Patient denies chest pain, shortness of breath, PND, orthopnea, headache, blurred vision or peripheral edema.?  ?   Hyperglycemia: Hemoglobin A1c (7/9/2020): 6.3.? Pt adhering to low carbohydrate eating habit.  ?   CKD3: GFR (7/9/2020): 41 with a BUN/creatinine 12/1.7. Pt is avoiding NSAIDs.  ?   Discussed labs with patient from 7/9/2020  Hemoglobin A1c (7/9/2020): 6.3  FLP (7/9/2020): Total cholesterol 158, HDL 43, triglyceride 131, LDL 89-within normal limits  GFR (7/9/2020): 41 with a BUN/creatinine 12/1.7  Review of Systems  GENERAL:?Negative for abnormal weight loss or weight gain, fatigue, fever, or chills. Negative except for stated in HPI.  HEENT:??Negative for head trauma,?blurred vision, rhinorrhea, ?nasal congestion, sore throat or hearing deficit. Negative except for stated in HPI.  CARDIOVASCULAR: Negative for? chest pain, palpitations, MARQUEZ, PND, orthopnea, peripheral edema or syncope. Negative except for stated in HPI.  RESPIRATORY: Negative for SOB, MARQUEZ, cough or?wheezing.? Negative except for stated in HPI.  GASTROINTESTINAL: Negative for abdominal pain, nausea,??vomiting, diarrhea, constipation, heartburn, ?hematochezia? or melena.? Negative except for stated in HPI.  GENITOURINARY: Negative for dysuria, hematuria, urinary frequency, urinary urgency, urinary hesitancy or flank pain.  Negative except for stated in HPI.  ENDOCRINE: Negative for fatigue, heat intolerance, cold intolerance, polyuria, polydipsia, or polyphagia.? Negative except for stated in HPI.  PSYCHOLOGICAL: Negative for depression, anxiety, suicidal or homicidal ideations, hallucinations??or?jamar.? Negative except for?stated in HPI.  MUSCULOSKELETAL: Negative for myalgia, joint pain, or joint swelling.? Negative except for?stated in HPI.  INTEGUMENT: Negative for rash?or lesions. Negative except for stated in HPI.  NEUROLOGY: Negative for headaches, dizziness, numbness, tingling,?vertigo?or gait disturbances. Negative except for stated in HPI.  ?  ?  Physical Exam  Vitals & Measurements  T:?36.8? ?C (Oral)? HR:?73(Peripheral)? RR:?18? BP:?142/88? SpO2:?97%?  HT:?160.00?cm? WT:?118.000?kg? BMI:?46.09?  GENERAL:?Alert and oriented to person, place, time and situation,?NAD  HEENT: NCAT, Pupils equally round and reactive to light accommodation,?normal sclera, ?moist mucous membranes,?oropharynx normal,?normal nasal turbinates/ nares,??TM clear bilaterally, neck?supple,?thyroid normal,?no lymphadenopathy.  CARDIOVASCULAR:?Regular rateand?rhythm,? S1 and S2 normal;?no murmurs, no rubs, or no gallops; no carotid bruit.  RESPIRATORY:??Lungs clear to auscultation bilaterally;?no wheezes,?no rhonchi,?or? no crackles  ABDOMEN: Soft/ Nontender/ Nondistended; Bowel Sounds x4 - normoactive; no abdominal pulsatile mass, no masses, no hernias  EXTREMITY:? No clubbing, no cyanosis and?no peripheral edema; Dorsalis pedis and tibial ?pulses 2+  MUSCULOSKELETAL: FROM of Upper and Lower extremities; Strength 5/5 of Upper and Lower extremities  PSYCHOLOGICAL: Good judgement and insight; normal affect and mood.  NEUROLOGICAL: Normal gait and ?normal sensation;?no focal deficits; Cranial Nerves 2 -12 grossly intact  ?  Assessment/Plan  1.?Hypertension?I10  Chronic issue/ uncontrolled  Goal BP <140/90  Continue to?monitor BP daily and bring in log at  next visit  Continue low sodium eating habit of ?less than 2 grams/ day/Dash eating habit  Encourage exercise for 30 minutes 5 days/ week (total 150 minutes/week)  Continue amlodipine 10 mg po daily  Restart medications from former PCP at half of their dosing?prevent hypotension  Continue ?irbesartan/HCTZ?150/12.5?mg p.o. daily  Continue metoprolol tartrate 25 mg p.o.?twice daily  Start hydralazine 25 mg po BID  12 lead EKG ordered and resulted ?84 BPM; LAE and NSR. No evidence of LVH.  ?  Ordered:  hydrALAZINE, 25 mg = 1 tab(s), Oral, BID, Hold for BP less than 100/ 50, # 60 tab(s), 0 Refill(s), Pharmacy: [facility]/pharmacy #[id number], 160, cm, Height/Length Dosing, 08/31/20 11:42:00 CDT, 118, kg, Weight Dosing, 08/31/20 11:42:00 CDT  ?  2.?CKD (chronic kidney disease) stage 3, GFR 30-59 ml/min?N18.3  Chronic issue  GFR (2/14/2018) 41? ?with BUN/ Creatinine 14/1.6.  Follow low sodium diet (less than 2 grams a day)  Control diabetes (goal A1C <7.0%)  Control high blood pressure ( goal BP < 130/80, please record BP at home every day and bring log to next office visit)  Exercise at least 30 minutes a day, 5 days a week.  Maintain healthy weight.  Stay well hydrated with water  Receive Pneumovax and Flu? if indicated.  Do not take NSAIDs (Ibuprofen, Naproxen, Aleve, Advil, Toradol, Mobic), may take only Tylenol as needed for pain/headaches.  Take cholesterol-lowering medications as prescribed (LDL goal <100)  Internal referral to [facility]  Ordered:  Protein/Creatinine Ratio Urine, Now collect, Urine, Order for future visit, 08/31/20 12:59:00 CDT, Stop date 08/31/20 12:59:00 CDT, Nurse collect, CKD (chronic kidney disease) stage 3, GFR 30-59 ml/min  ?  3.?Hyperglycemia?R73.9  ?Chronic issue  Continue low carbohydrate eating habit  ?  ?  RTC in 6 wks HTN  Referrals  [facility] Internal Referral to Nephrology Clinic, Specialty: Nephrology, Reason: 50 yo female with CKD 3 GFR (7/9/2020): 41 with a BUN/creatinine 12/1.7.PMHx HTN and  hyperglycemia. Please eval and treat. Thank you, Dr. ANGELIQUE Guevara, Start: 08/31/20 12:53:00 CDT, Instructions: Refer to [facility] Ne[facility]...   Problem List/Past Medical History  Ongoing  Allergic rhinitis, seasonal  Anemia  CKD (chronic kidney disease) stage 3, GFR 30-59 ml/min  Dysmenorrhea  Environmental allergies  Hepatitis B  Hypertension  Menorrhalgia  Morbid obesity  Morbid obesity  Sickle cell trait  Historical  Pregnant  Pregnant  Pregnant  Pregnant  Procedure/Surgical History  Cystoscopy (None) (07/19/2018)  Cystourethroscopy (separate procedure) (07/19/2018)  Hysterectomy Vaginal (None) (07/19/2018)  Inspection of Bladder, Via Natural or Artificial Opening Endoscopic (07/19/2018)  Repair Cul-de-sac, Open Approach (07/19/2018)  Resection of Right Fallopian Tube, Via Natural or Artificial Opening (07/19/2018)  Resection of Uterus, Via Natural or Artificial Opening (07/19/2018)  Salpingectomy (Bilateral) (07/19/2018)  Vaginal hysterectomy, for uterus 250 g or less; with removal of tube(s), and/or ovary(s), with repair of enterocele (07/19/2018)  Tubal ligation (1993)   Medications  amlodipine 10 mg oral tablet, 10 mg= 1 tab(s), Oral, Daily, 1 refills  aspirin 81 mg oral Delayed Release (EC) tablet, 81 mg= 1 tab(s), Oral, Daily, 5 refills  Blood pressure monitor with large adult cuff, See Instructions  Caltrate, 1200 mg, Oral, Daily  CLONIDINE TAB 0.1MG,? ?Not taking  docusate sodium 100 mg oral capsule, 100 mg= 1 cap(s), Oral, Daily  Flonase 50 mcg/inh nasal spray, 2 spray(s), Nasal, BID, 5 refills  hydrALAZINE 25 mg oral tablet, 25 mg= 1 tab(s), Oral, BID  hydrochlorothiazide-irbesartan 12.5 mg-150 mg oral tablet, 1 tab(s), Oral, Daily, 5 refills  Iron-150 oral tablet, 1 tab(s), Oral, Daily  levocetirizine 5 mg oral tablet, 5 mg= 1 tab(s), Oral, qPM, 5 refills  metoprolol tartrate 25 mg oral tab, 25 mg= 1 tab(s), Oral, BID, 5 refills  Vitamin B12  Allergies  No Known Allergies  Social History  Abuse/Neglect  No,  No, Yes, 08/31/2020  Alcohol  Current, Beer, 11/04/2016  Employment/School  Employed, 11/04/2016  Exercise  Exercise frequency: 1-2 times/week. Exercise type: Walking., 05/25/2018  Home/Environment  Lives with Children. Living situation: Home/Independent., 05/25/2018    Never in , 08/31/2020  Nutrition/Health  Regular, 11/04/2016  Sexual  Sexually active: No., 05/25/2018  Spiritual/Cultural  Non denomination, 06/23/2020  Substance Use  Never, 11/04/2016  Tobacco  Never (less than 100 in lifetime), N/A, 08/31/2020  Family History  Aneurysm: Sister.  Aneurysm: Sister.  Diabetes mellitus type 2: Sister.  HTN (hypertension).......: Father.  Heart disease: Brother.  Hypertension.: Sister.  Stomach cancer.....: Mother.  Thyroid: Sister.  Immunizations  Vaccine Date Status   zoster vaccine, inactivated 07/29/2020 Given   tetanus/diphtheria/pertussis, acel(Tdap) 07/29/2020 Given   hepatitis B adult vaccine 03/05/2013 Recorded   hepatitis B adult vaccine 02/06/2013 Recorded   influenza virus vaccine, inactivated 02/06/2013 Recorded   tetanus-diphtheria toxoids 03/24/1984 Recorded   mumps virus vaccine 01/17/1980 Recorded   rubella virus vaccine 04/01/1971 Recorded   measles virus vaccine 05/13/1969 Recorded   Health Maintenance  Health Maintenance  ???Pending?(in the next year)  ??? ??Due?  ??? ? ? ?Breast Cancer Screening due??08/31/20??and every?  ??? ? ? ?Colorectal Screening due??08/31/20??and every?  ??? ? ? ?Influenza Vaccine due??08/31/20??and every?  ??? ? ? ?Zoster Vaccine due??08/31/20??and every?  ??? ??Due In Future?  ??? ? ? ?Obesity Screening not due until??01/01/21??and every 1??year(s)  ??? ? ? ?Alcohol Misuse Screening not due until??01/02/21??and every 1??year(s)  ??? ? ? ?ADL Screening not due until??06/23/21??and every 1??year(s)  ??? ? ? ?Diabetes Screening not due until??07/09/21??and every 1??year(s)  ??? ? ? ?Hypertension Management-BMP not due until??07/09/21??and every  1??year(s)  ???Satisfied?(in the past 1 year)  ??? ??Satisfied?  ??? ? ? ?ADL Screening on??06/23/20.??Satisfied by Catie Taylor LPN  ??? ? ? ?Alcohol Misuse Screening on??06/23/20.??Satisfied by Catie Taylor LPN  ??? ? ? ?Blood Pressure Screening on??08/31/20.??Satisfied by Catie Taylor LPN  ??? ? ? ?Body Mass Index Check on??08/31/20.??Satisfied by Catie Taylor LPN  ??? ? ? ?Depression Screening on??08/31/20.??Satisfied by Catie Taylor LPN  ??? ? ? ?Diabetes Screening on??07/09/20.??Satisfied by Laverne Amaro  ??? ? ? ?Hypertension Management-Blood Pressure on??08/31/20.??Satisfied by Catie Taylor LPN  ??? ? ? ?Lipid Screening on??07/09/20.??Satisfied by Laverne Amaro  ??? ? ? ?Obesity Screening on??08/31/20.??Satisfied by Catie Taylor LPN  ??? ? ? ?Tetanus Vaccine on??07/29/20.??Satisfied by Catie Taylor LPN  ??? ??Postponed?  ??? ? ? ?Tetanus Vaccine on??06/23/20.??Recorded by Anila Guevara MD  ??? ??Refused?  ??? ? ? ?Tetanus Vaccine on??06/23/20.??Recorded by Catie Taylor LPN??Reason: Patient Refuses  ??? ? ? ?Zoster Vaccine on??08/31/20.??Recorded by Catie Taylor LPN??Reason: Patient Refuses  ?

## 2022-05-03 NOTE — HISTORICAL [facility]ER
This is a historical note converted from Delmy. Formatting and pictures may have been removed.  Please reference Delmy for original formatting and attached multimedia. History of Present Illness  Past medical history: Allergic rhinitis.? Anemia.? Chronic hepatitis B.? CKD, stage III.? Impaired glucose tolerance.? Dysmenorrhea.? Hypertension.? Menorrhagia.? Morbid obesity.? Sickle cell trait.? Tubal ligation ().? Total vaginal hysterectomy, right salpingectomy, modified McCalls, cystoscopy (2018, for abnormal uterine bleeding and dysmenorrhea).  -Diagnosed with hepatitis B 30 years ago on screening when pregnant; never offered treatment in the past; evaluated by ID on 2021,?chronic inactive hepatitis B, no evidence of necronephro inflammation, no elevated LFTs, expectant monitoring  Social history:??but lives separately from her .? Lives in [place], [place].? She is a .? Has 3 living children.? A son  from sickle cell?crisis at age 19 years.? She has sickle cell trait.? Her 3 living children have sickle cell trait.? Her  also has sickle cell trait.? Occasional beer or wine. ?No tobacco or illicit drug abuse.  Family history:?Mother is  from stomach cancer at age 66 years; used to smoke.  Health maintenance:  -2018: ThinPrep cervical Pap smear: NIL  -2021: Screening mammogram (comparison: 2017): Both breast negative (BI-RADS 1)  -2020: FIT test negative  Menstrual and OB/GYN history:?S/p?total vaginal hysterectomy?and right salpingectomy?(2018)?for abnormal uterine bleeding and dysmenorrhea  ?  ?  History of present illness:  51-year-old lady referred from nephrology, for evaluation of MGUS.  ?  She was found to have M spike on SPEP in the course of work-up for proteinuria.? Other dedicated testing was negative.  ?  2021:  -SPEP and HUGH: 0.1 g/dL monoclonal spike; faint band in gamma region suspicious for  monoclonal immunoglobulin (benign spike as seen in older people versus paraprotein)  ?  06/08/2021:  -Kappa 49.7, elevated; lambda 42.1, elevated; kappa/lambda ratio 1.18, normal  -SPEP and HUGH: 0.2 g/dL IgG kappa monoclonal protein  -IgG, IgA, IgM: Normal  ?  01/26/2021:  -SIMRAN negative; HIV negative; hepatitis A IgM negative; hepatitis B core IgM negative; hepatitis B surface antigen reactive; hepatitis C antibody negative  ?  02/19/2021:  -Syphilis antibody negative; HIV negative  -Hepatitis A IgG negative, hepatitis A IgM negative  -Hepatitis B core antibody positive; hepatitis B surface antibody negative; hepatitis B surface antigen reactive; hepatitis C antibody negative; hepatitis B e antibody positive; hepatitis B e antigen negative; hepatitis B viral load 200 IU/mL  ?  -CKD: BUN intermittently elevated; creatinine chronically elevated, as far back as 03/26/2018 (1.73)  ?  -Chronic hyperglobulinemia, around 4.4 g/dL  -Hyperuricemia, 9.7  -Proteinuria: Urine protein/creatinine ratio: 719.4 (06/08/2021); 746.1 (01/26/2021); 747.4 (10/29/2020); 1073.8 (08/31/2020)  -Hemoglobin normal  ?  07/13/2021:  Presents for initial hematology consultation.? Very pleasant lady. ?In no acute discomfort.  Endorses no symptoms of concern whatsoever.? No weakness, fatigue, malaise, repeated fevers or infections, anorexia, unintentional weight loss,?history of pathologic fractures, unusual headaches, focal neurological symptoms, chest pain, cough, dyspnea, abdominal pain, nausea, vomiting, etc.? No urinary problems.  ?  Interval history:  ?   Review of systems:  All systems reviewed, and found to be negative except for the symptoms detailed above.  ?  Physical examination:  VITAL SIGNS:? Reviewed.? ?  GENERAL:? In no apparent distress.  HEAD:? No signs of head trauma.  EYES:? Pupils are equal.? Extraocular motions intact.  EARS:? Hearing grossly intact.  MOUTH:? Oropharynx is normal.  NECK:? No adenopathy, no JVD.? ?  CHEST:?  Chest with clear breath sounds bilaterally.? No wheezes, rales, or rhonchi.  CARDIAC:? Regular rate and rhythm.? S1 and S2, without murmurs, gallops, or rubs.  VASCULAR:? No Edema.? Peripheral pulses normal and equal in all extremities.  ABDOMEN:? Soft, without detectable tenderness.? No sign of distention.? No? ?rebound or guarding, and no masses palpated.? ?Bowel Sounds normal.  MUSCULOSKELETAL:? Good range of motion of all major joints. Extremities without clubbing, cyanosis or edema.  NEUROLOGIC EXAM:? Alert and oriented x 3.? No focal sensory or strength deficits.? ?Speech normal.? Follows commands.  PSYCHIATRIC:? Mood normal.  SKIN:? No rash or lesions.  ?  Assessment:  51-year-old lady found to have monoclonal gammopathy in the course of evaluation for CKD and proteinuria  ?  #Monoclonal gammopathy:  -01/26/2021: SPEP and HUGH: 0.1 g/dL monoclonal spike  -06/08/2021: Kappa elevated, lambda elevated, kappa/lambda ratio 1.18, normal; 0.2 g/dL IgG kappa monoclonal protein; quantitative immunoglobulins normal  -CKD: BUN intermittently elevated; creatinine chronically elevated, as far back as 03/26/2018 (1.73)  -No anemia; no hypercalcemia  -Proteinuria: Urine protein/creatinine ratio: 719.4 (06/08/2021); 746.1 (01/26/2021); 747.4 (10/29/2020); 1073.8 (08/31/2020)  ?  #Chronic inactive hepatitis B, followed expectantly by ID  -Diagnosed with hepatitis B 30 years ago on screening when pregnant; never offered treatment in the past; evaluated by ID on 03/04/2021,?chronic inactive hepatitis B, no evidence of necronephro inflammation, no elevated LFTs, expectant monitoring  ?  Plan:  -With CKD, proteinuria, and monoclonal gammopathy, need to rule out multiple myeloma  -Recheck SPEP, HUGH, FLC assay, and 24-hour urine specimen for total protein, UPEP, urine HUGH, and urine FLC assay  -Bone marrow aspiration and core biopsy to quantify monoclonal plasmacytosis, if any  -Skeletal survey is acceptable in certain  circumstances; however, it is significantly less sensitive?than whole-body low-dose CT?and FDG PET/CT  >>?Will order a skeletal survey;?may need whole-body low-dose CT scan or FDG PET/CT?depending upon bone marrow biopsy report; if whole-body low-dose CT or FDG PET/CT is negative,?then the recommendation is to consider whole-body MRI without contrast?to discern smoldering myeloma?from multiple myeloma  ?  Follow-up visit in 1 month, with labs in 1 year biopsy report.  ?  Above discussed with the patient.? All questions answered.  Discussed labs?and the rationale of investigations.  She understands and agrees this plan.  Physical Exam  Vitals & Measurements  T:?36.8? ?C (Oral)? HR:?91(Peripheral)? RR:?20? BP:?143/94?  HT:?160.00?cm? WT:?125.800?kg? BMI:?49.14?   Problem List/Past Medical History  Ongoing  Allergic rhinitis, seasonal  Anemia  Chronic hepatitis B  CKD (chronic kidney disease) stage 3, GFR 30-59 ml/min  Dysmenorrhea  Environmental allergies  Hyperglycemia  Hypertension  Menorrhalgia  Morbid obesity  Morbid obesity  Sickle cell trait  Historical  Pregnant  Pregnant  Pregnant  Pregnant  Procedure/Surgical History  Cystoscopy (None) (07/19/2018)  Cystourethroscopy (separate procedure) (07/19/2018)  Hysterectomy Vaginal (None) (07/19/2018)  Inspection of Bladder, Via Natural or Artificial Opening Endoscopic (07/19/2018)  Repair Cul-de-sac, Open Approach (07/19/2018)  Resection of Right Fallopian Tube, Via Natural or Artificial Opening (07/19/2018)  Resection of Uterus, Via Natural or Artificial Opening (07/19/2018)  Salpingectomy (Bilateral) (07/19/2018)  Vaginal hysterectomy, for uterus 250 g or less; with removal of tube(s), and/or ovary(s), with repair of enterocele (07/19/2018)  Tubal ligation (1993)   Medications  allopurinol 100 mg oral tablet, 100 mg= 1 tab(s), Oral, Daily, 1 refills  amlodipine 10 mg oral tablet, 10 mg= 1 tab(s), Oral, Daily, 3 refills  aspirin 81 mg oral Delayed Release (EC)  tablet, 81 mg= 1 tab(s), Oral, Daily, 3 refills  Blood pressure monitor with large adult cuff, See Instructions  Caltrate, 1200 mg, Oral, Daily  cyclobenzaprine 5 mg oral tablet, 5 mg= 1 tab(s), Oral, BID, 1 refills  docusate sodium 100 mg oral capsule, 100 mg= 1 cap(s), Oral, Daily  fluticasone 50 mcg/inh nasal spray, See Instructions,? ?Not taking  hydrALAZINE 25 mg oral tablet, 25 mg= 1 tab(s), Oral, BID, 3 refills  hydrochlorothiazide-irbesartan 12.5 mg-150 mg oral tablet, 1 tab(s), Oral, Daily, 3 refills  Iron-150 oral tablet, 1 tab(s), Oral, Daily  levocetirizine 5 mg oral tablet, 5 mg= 1 tab(s), Oral, qPM, 3 refills  metoprolol tartrate 25 mg oral tab, 25 mg= 1 tab(s), Oral, BID, 3 refills  Vitamin B12  Allergies  No Known Allergies  Social History  Abuse/Neglect  No, No, Yes, 06/16/2021  Alcohol  Current, Beer, 3-5 times per week, Alcohol use interferes with work or home: No. Others hurt by drinking: No. Household alcohol concerns: No., 06/16/2021  Employment/School  Employed, 11/04/2016  Exercise  Exercise frequency: 1-2 times/week. Exercise type: Walking., 05/25/2018  Financial/Legal Situation  None, 10/12/2020  Home/Environment  Lives with Children. Living situation: Home/Independent., 05/25/2018    Never in , 08/31/2020  Nutrition/Health  Regular, 11/04/2016  Sexual  Sexually active: No., 05/25/2018  Spiritual/Cultural  Non denomination, 06/23/2020  Substance Use  Never, 11/04/2016  Tobacco  Never (less than 100 in lifetime), N/A, Household tobacco concerns: No. Smokeless Tobacco Use: Never., 06/16/2021  Family History  Aneurysm: Sister.  Aneurysm: Sister.  Diabetes mellitus type 2: Sister.  HTN (hypertension).......: Father.  Heart disease: Brother.  Hypertension.: Sister.  Stomach cancer.....: Mother.  Thyroid: Sister.  Immunizations  Vaccine Date Status   influenza virus vaccine, inactivated 10/29/2020 Given   zoster vaccine, inactivated 09/29/2020 Given   zoster vaccine, inactivated  07/29/2020 Given   tetanus/diphtheria/pertussis, acel(Tdap) 07/29/2020 Given   tetanus/diphtheria/pertussis, acel(Tdap) 03/05/2013 Recorded   hepatitis B adult vaccine 03/05/2013 Recorded   influenza virus vaccine, inactivated 02/06/2013 Recorded   hepatitis B adult vaccine 02/06/2013 Recorded   tetanus-diphtheria toxoids 03/24/1984 Recorded   mumps virus vaccine 01/17/1980 Recorded   poliovirus vaccine, live, trivalent 04/04/1974 Recorded   rubella virus vaccine 04/01/1971 Recorded   poliovirus vaccine, live, trivalent 08/14/1969 Recorded   measles virus vaccine 05/13/1969 Recorded   poliovirus vaccine, live, trivalent 1968 Recorded   poliovirus vaccine, live, trivalent 1968 Recorded

## 2022-08-02 RX ORDER — HYDRALAZINE HYDROCHLORIDE 25 MG/1
25 TABLET, FILM COATED ORAL 2 TIMES DAILY
COMMUNITY
Start: 2022-02-07 | End: 2022-08-10 | Stop reason: SDUPTHER

## 2022-08-02 RX ORDER — LEVOCETIRIZINE DIHYDROCHLORIDE 5 MG/1
1 TABLET, FILM COATED ORAL NIGHTLY
COMMUNITY
Start: 2022-02-07 | End: 2022-08-10 | Stop reason: SDUPTHER

## 2022-08-02 RX ORDER — IRBESARTAN AND HYDROCHLOROTHIAZIDE 150; 12.5 MG/1; MG/1
1 TABLET, FILM COATED ORAL DAILY
COMMUNITY
Start: 2022-02-07 | End: 2022-08-10 | Stop reason: SDUPTHER

## 2022-08-02 RX ORDER — METOPROLOL TARTRATE 25 MG/1
25 TABLET, FILM COATED ORAL 2 TIMES DAILY
COMMUNITY
Start: 2022-02-07 | End: 2022-08-10 | Stop reason: SDUPTHER

## 2022-08-02 RX ORDER — ALLOPURINOL 100 MG/1
100 TABLET ORAL DAILY
COMMUNITY
Start: 2022-02-07 | End: 2022-08-10 | Stop reason: SDUPTHER

## 2022-08-02 RX ORDER — AMLODIPINE BESYLATE 10 MG/1
10 TABLET ORAL DAILY
COMMUNITY
Start: 2022-02-07 | End: 2023-02-13 | Stop reason: SDUPTHER

## 2022-08-02 RX ORDER — ASPIRIN 81 MG/1
81 TABLET ORAL DAILY
COMMUNITY
Start: 2022-02-07 | End: 2022-08-10 | Stop reason: SDUPTHER

## 2022-08-05 ENCOUNTER — LAB VISIT (OUTPATIENT)
Dept: LAB | Facility: HOSPITAL | Age: 52
End: 2022-08-05
Attending: NURSE PRACTITIONER
Payer: COMMERCIAL

## 2022-08-05 DIAGNOSIS — N18.9 CHRONIC KIDNEY DISEASE, UNSPECIFIED CKD STAGE: ICD-10-CM

## 2022-08-05 LAB
ALBUMIN SERPL-MCNC: 3.5 GM/DL (ref 3.5–5)
ALBUMIN/GLOB SERPL: 1 RATIO (ref 1.1–2)
ALP SERPL-CCNC: 75 UNIT/L (ref 40–150)
ALT SERPL-CCNC: 19 UNIT/L (ref 0–55)
APPEARANCE UR: CLEAR
AST SERPL-CCNC: 22 UNIT/L (ref 5–34)
BACTERIA #/AREA URNS AUTO: ABNORMAL /HPF
BILIRUB UR QL STRIP.AUTO: NEGATIVE MG/DL
BILIRUBIN DIRECT+TOT PNL SERPL-MCNC: 0.4 MG/DL
BUN SERPL-MCNC: 13.6 MG/DL (ref 9.8–20.1)
CALCIUM SERPL-MCNC: 9.5 MG/DL (ref 8.4–10.2)
CHLORIDE SERPL-SCNC: 101 MMOL/L (ref 98–107)
CO2 SERPL-SCNC: 30 MMOL/L (ref 22–29)
COLOR UR AUTO: COLORLESS
CREAT SERPL-MCNC: 1.84 MG/DL (ref 0.55–1.02)
CREAT UR-MCNC: 56.3 MG/DL (ref 47–110)
GLOBULIN SER-MCNC: 3.5 GM/DL (ref 2.4–3.5)
GLUCOSE SERPL-MCNC: 104 MG/DL (ref 74–100)
GLUCOSE UR QL STRIP.AUTO: NORMAL MG/DL
HYALINE CASTS #/AREA URNS LPF: ABNORMAL /LPF
KETONES UR QL STRIP.AUTO: NEGATIVE MG/DL
LEUKOCYTE ESTERASE UR QL STRIP.AUTO: NEGATIVE UNIT/L
NITRITE UR QL STRIP.AUTO: NEGATIVE
PH UR STRIP.AUTO: 5.5 [PH]
POTASSIUM SERPL-SCNC: 3.5 MMOL/L (ref 3.5–5.1)
PROT SERPL-MCNC: 7 GM/DL (ref 6.4–8.3)
PROT UR QL STRIP.AUTO: ABNORMAL MG/DL
PROT UR STRIP-MCNC: 48 MG/DL
PROT/CREAT UR-RTO: 852.6 MG/GM CR
RBC #/AREA URNS AUTO: ABNORMAL /HPF
RBC UR QL AUTO: NEGATIVE UNIT/L
SODIUM SERPL-SCNC: 138 MMOL/L (ref 136–145)
SP GR UR STRIP.AUTO: 1.01
SQUAMOUS #/AREA URNS LPF: ABNORMAL /HPF
UROBILINOGEN UR STRIP-ACNC: NORMAL MG/DL
WBC #/AREA URNS AUTO: ABNORMAL /HPF

## 2022-08-05 PROCEDURE — 80053 COMPREHEN METABOLIC PANEL: CPT

## 2022-08-05 PROCEDURE — 81001 URINALYSIS AUTO W/SCOPE: CPT

## 2022-08-05 PROCEDURE — 36415 COLL VENOUS BLD VENIPUNCTURE: CPT

## 2022-08-05 PROCEDURE — 82570 ASSAY OF URINE CREATININE: CPT

## 2022-08-10 ENCOUNTER — OFFICE VISIT (OUTPATIENT)
Dept: NEPHROLOGY | Facility: CLINIC | Age: 52
End: 2022-08-10
Payer: COMMERCIAL

## 2022-08-10 VITALS
SYSTOLIC BLOOD PRESSURE: 124 MMHG | RESPIRATION RATE: 18 BRPM | OXYGEN SATURATION: 98 % | HEART RATE: 70 BPM | WEIGHT: 279.19 LBS | DIASTOLIC BLOOD PRESSURE: 73 MMHG | BODY MASS INDEX: 49.47 KG/M2 | HEIGHT: 63 IN | TEMPERATURE: 98 F

## 2022-08-10 DIAGNOSIS — I10 PRIMARY HYPERTENSION: ICD-10-CM

## 2022-08-10 DIAGNOSIS — N18.32 CKD STAGE G3B/A3, GFR 30-44 AND ALBUMIN CREATININE RATIO >300 MG/G: Primary | ICD-10-CM

## 2022-08-10 DIAGNOSIS — E79.0 HYPERURICEMIA: ICD-10-CM

## 2022-08-10 PROBLEM — N94.6 DYSMENORRHEA: Status: ACTIVE | Noted: 2022-08-10

## 2022-08-10 PROBLEM — N18.30 STAGE 3 CHRONIC KIDNEY DISEASE: Status: ACTIVE | Noted: 2022-08-10

## 2022-08-10 PROBLEM — D57.3 SICKLE CELL TRAIT: Status: ACTIVE | Noted: 2022-08-10

## 2022-08-10 PROBLEM — E66.01 MORBID OBESITY: Status: ACTIVE | Noted: 2022-08-10

## 2022-08-10 PROBLEM — D64.9 ANEMIA: Status: ACTIVE | Noted: 2022-08-10

## 2022-08-10 PROBLEM — R73.9 HYPERGLYCEMIA: Status: ACTIVE | Noted: 2022-08-10

## 2022-08-10 PROBLEM — Z91.09 ENVIRONMENTAL ALLERGIES: Status: ACTIVE | Noted: 2022-08-10

## 2022-08-10 PROBLEM — B18.1 CHRONIC HEPATITIS B VIRUS INFECTION: Status: ACTIVE | Noted: 2022-08-10

## 2022-08-10 PROBLEM — J30.2 SEASONAL ALLERGIC RHINITIS: Status: ACTIVE | Noted: 2022-08-10

## 2022-08-10 PROCEDURE — 3078F PR MOST RECENT DIASTOLIC BLOOD PRESSURE < 80 MM HG: ICD-10-PCS | Mod: CPTII,,, | Performed by: NURSE PRACTITIONER

## 2022-08-10 PROCEDURE — 1159F PR MEDICATION LIST DOCUMENTED IN MEDICAL RECORD: ICD-10-PCS | Mod: CPTII,,, | Performed by: NURSE PRACTITIONER

## 2022-08-10 PROCEDURE — 99214 OFFICE O/P EST MOD 30 MIN: CPT | Mod: PBBFAC | Performed by: NURSE PRACTITIONER

## 2022-08-10 PROCEDURE — 99214 PR OFFICE/OUTPT VISIT, EST, LEVL IV, 30-39 MIN: ICD-10-PCS | Mod: S$PBB,,, | Performed by: NURSE PRACTITIONER

## 2022-08-10 PROCEDURE — 3074F SYST BP LT 130 MM HG: CPT | Mod: CPTII,,, | Performed by: NURSE PRACTITIONER

## 2022-08-10 PROCEDURE — 1160F PR REVIEW ALL MEDS BY PRESCRIBER/CLIN PHARMACIST DOCUMENTED: ICD-10-PCS | Mod: CPTII,,, | Performed by: NURSE PRACTITIONER

## 2022-08-10 PROCEDURE — 99214 OFFICE O/P EST MOD 30 MIN: CPT | Mod: S$PBB,,, | Performed by: NURSE PRACTITIONER

## 2022-08-10 PROCEDURE — 3066F PR DOCUMENTATION OF TREATMENT FOR NEPHROPATHY: ICD-10-PCS | Mod: CPTII,,, | Performed by: NURSE PRACTITIONER

## 2022-08-10 PROCEDURE — 1159F MED LIST DOCD IN RCRD: CPT | Mod: CPTII,,, | Performed by: NURSE PRACTITIONER

## 2022-08-10 PROCEDURE — 3008F PR BODY MASS INDEX (BMI) DOCUMENTED: ICD-10-PCS | Mod: CPTII,,, | Performed by: NURSE PRACTITIONER

## 2022-08-10 PROCEDURE — 3078F DIAST BP <80 MM HG: CPT | Mod: CPTII,,, | Performed by: NURSE PRACTITIONER

## 2022-08-10 PROCEDURE — 3074F PR MOST RECENT SYSTOLIC BLOOD PRESSURE < 130 MM HG: ICD-10-PCS | Mod: CPTII,,, | Performed by: NURSE PRACTITIONER

## 2022-08-10 PROCEDURE — 1160F RVW MEDS BY RX/DR IN RCRD: CPT | Mod: CPTII,,, | Performed by: NURSE PRACTITIONER

## 2022-08-10 PROCEDURE — 3008F BODY MASS INDEX DOCD: CPT | Mod: CPTII,,, | Performed by: NURSE PRACTITIONER

## 2022-08-10 PROCEDURE — 3066F NEPHROPATHY DOC TX: CPT | Mod: CPTII,,, | Performed by: NURSE PRACTITIONER

## 2022-08-10 RX ORDER — CHOLECALCIFEROL (VITAMIN D3) 25 MCG
1000 TABLET ORAL DAILY
COMMUNITY
End: 2022-08-10

## 2022-08-10 RX ORDER — LEVOCETIRIZINE DIHYDROCHLORIDE 5 MG/1
5 TABLET, FILM COATED ORAL NIGHTLY
Qty: 90 TABLET | Refills: 3 | Status: SHIPPED | OUTPATIENT
Start: 2022-08-10 | End: 2023-02-13 | Stop reason: SDUPTHER

## 2022-08-10 RX ORDER — ALLOPURINOL 100 MG/1
100 TABLET ORAL DAILY
Qty: 90 TABLET | Refills: 3 | Status: SHIPPED | OUTPATIENT
Start: 2022-08-10 | End: 2023-02-13 | Stop reason: SDUPTHER

## 2022-08-10 RX ORDER — BISACODYL 5 MG
5 TABLET, DELAYED RELEASE (ENTERIC COATED) ORAL DAILY PRN
COMMUNITY
End: 2022-08-10

## 2022-08-10 RX ORDER — ASPIRIN 81 MG/1
81 TABLET ORAL DAILY
Qty: 90 TABLET | Refills: 3 | Status: SHIPPED | OUTPATIENT
Start: 2022-08-10 | End: 2023-05-10

## 2022-08-10 RX ORDER — HYDRALAZINE HYDROCHLORIDE 25 MG/1
25 TABLET, FILM COATED ORAL 2 TIMES DAILY
Qty: 180 TABLET | Refills: 3 | Status: SHIPPED | OUTPATIENT
Start: 2022-08-10 | End: 2023-02-13 | Stop reason: SDUPTHER

## 2022-08-10 RX ORDER — METOPROLOL TARTRATE 25 MG/1
25 TABLET, FILM COATED ORAL 2 TIMES DAILY
Qty: 180 TABLET | Refills: 3 | Status: SHIPPED | OUTPATIENT
Start: 2022-08-10 | End: 2023-02-13 | Stop reason: SDUPTHER

## 2022-08-10 RX ORDER — IRBESARTAN AND HYDROCHLOROTHIAZIDE 150; 12.5 MG/1; MG/1
1 TABLET, FILM COATED ORAL DAILY
Qty: 90 TABLET | Refills: 3 | Status: SHIPPED | OUTPATIENT
Start: 2022-08-10 | End: 2023-02-13

## 2022-08-10 NOTE — PROGRESS NOTES
"[facility]  Nephrology Clinic Note   Chief Complaint   Patient presents with    Chronic Kidney Disease     RTC, took meds, edema catarina le      History of Present Illness  Ms Courtney Easley is a 52 y.o. Black or  female with past medical history of CKD, hypertension, sickle cell trait, anemia, left ventricular hypertrophy, IGT, hepatitis B, and morbid obesity. Patient presents for follow-up appointment in nephrology clinic today. Denies complaints.     Review of Systems  Twelve point review of systems conducted, negative except as stated in history of present illness.    Review of patient's allergies indicates:  No Known Allergies    Past Medical History:   Past Medical History:   Diagnosis Date    Anemia, unspecified     CKD (chronic kidney disease)     HTN (hypertension)     IGT (impaired glucose tolerance)     Left ventricular hypertrophy     Morbid obesity     Sickle cell trait        Procedure History:   Past Surgical History:   Procedure Laterality Date    CYSTOSCOPY  07/19/2018    CYSTOURETHROSCOPY  07/19/2018    HYSTERECTOMY  07/19/2018    SALPINGECTOMY Bilateral 07/19/2018    TUBAL LIGATION         Family History: family history includes Aneurysm in her sister; Cancer in her mother; Diabetes in her sister; Heart disease in her brother; Hypertension in her brother and father.    Social History:  reports that she has never smoked. She has never used smokeless tobacco. She reports current alcohol use of about 10.0 standard drinks of alcohol per week. She reports that she does not use drugs.    Physical Exam:   /73 (BP Location: Right arm, Patient Position: Sitting, BP Method: Large (Automatic))   Pulse 70   Temp 98.4 °F (36.9 °C) (Oral)   Resp 18   Ht 5' 3.39" (1.61 m)   Wt 126.6 kg (279 lb 3.2 oz)   SpO2 98%   BMI 48.86 kg/m²     General appearance: Patient is in no acute distress.  Skin: No rashes or wounds.  HEENT: PERRLA, EOMI, no " scleral icterus, no JVD. Neck is supple.  Chest: Respirations are unlabored. Lungs sounds are clear.   Heart: S1, S2.   Abdomen: Benign.  : Deferred.  Extremities: Trace BLE edema, peripheral pulses are palpable.   Neuro: No focal deficits.     Home Medications:    Current Outpatient Medications:     amLODIPine (NORVASC) 10 MG tablet, Take 10 mg by mouth once daily., Disp: , Rfl:     allopurinoL (ZYLOPRIM) 100 MG tablet, Take 1 tablet (100 mg total) by mouth once daily., Disp: 90 tablet, Rfl: 3    aspirin (ECOTRIN) 81 MG EC tablet, Take 1 tablet (81 mg total) by mouth once daily., Disp: 90 tablet, Rfl: 3    hydrALAZINE (APRESOLINE) 25 MG tablet, Take 1 tablet (25 mg total) by mouth 2 (two) times a day., Disp: 180 tablet, Rfl: 3    irbesartan-hydrochlorothiazide (AVALIDE) 150-12.5 mg per tablet, Take 1 tablet by mouth once daily., Disp: 90 tablet, Rfl: 3    levocetirizine (XYZAL) 5 MG tablet, Take 1 tablet (5 mg total) by mouth every evening., Disp: 90 tablet, Rfl: 3    metoprolol tartrate (LOPRESSOR) 25 MG tablet, Take 1 tablet (25 mg total) by mouth 2 (two) times a day., Disp: 180 tablet, Rfl: 3     Laboratory Data:   Recent Results (from the past 504 hour(s))   Comprehensive Metabolic Panel    Collection Time: 08/05/22  9:36 AM   Result Value Ref Range    Sodium Level 138 136 - 145 mmol/L    Potassium Level 3.5 3.5 - 5.1 mmol/L    Chloride 101 98 - 107 mmol/L    Carbon Dioxide 30 (H) 22 - 29 mmol/L    Glucose Level 104 (H) 74 - 100 mg/dL    Blood Urea Nitrogen 13.6 9.8 - 20.1 mg/dL    Creatinine 1.84 (H) 0.55 - 1.02 mg/dL    Calcium Level Total 9.5 8.4 - 10.2 mg/dL    Protein Total 7.0 6.4 - 8.3 gm/dL    Albumin Level 3.5 3.5 - 5.0 gm/dL    Globulin 3.5 2.4 - 3.5 gm/dL    Albumin/Globulin Ratio 1.0 (L) 1.1 - 2.0 ratio    Bilirubin Total 0.4 <=1.5 mg/dL    Alkaline Phosphatase 75 40 - 150 unit/L    Alanine Aminotransferase 19 0 - 55 unit/L    Aspartate Aminotransferase 22 5 - 34 unit/L    Estimated  GFR- 37 mls/min/1.73/m2   Protein/Creatinine Ratio, Urine    Collection Time: 08/05/22  9:36 AM   Result Value Ref Range    Urine Protein Level 48.0 mg/dL    Urine Creatinine 56.3 47.0 - 110.0 mg/dL    Urine Protein/Creatinine Ratio 852.6 (H) <=200.0 mg/gm Cr   Urinalysis    Collection Time: 08/05/22  9:36 AM   Result Value Ref Range    Color, UA Colorless Yellow, Colorless, Other, Clear    Appearance, UA Clear Clear    Specific Gravity, UA 1.006     pH, UA 5.5 5.0, 5.5, 6.0, 6.5, 7.0, 7.5, 8.0, 8.5    Protein, UA 1+ (A) Negative, 300  mg/dL    Glucose, UA Normal Negative, Normal mg/dL    Ketones, UA Negative Negative, +1, +2, +3, +4, +5, >=160, >=80 mg/dL    Blood, UA Negative Negative unit/L    Bilirubin, UA Negative Negative mg/dL    Urobilinogen, UA Normal 0.2, 1.0, Normal mg/dL    Nitrites, UA Negative Negative    Leukocyte Esterase, UA Negative Negative, 75  unit/L    WBC, UA 0-5 None Seen, 0-2, 3-5, 0-5 /HPF    Bacteria, UA None Seen None Seen /HPF    Squamous Epithelial Cells, UA Trace (A) None Seen /HPF    Hyaline Casts, UA None Seen None Seen /lpf    RBC, UA 0-5 None Seen, 0-2, 3-5, 0-5 /HPF       Imaging:  Kidney ultrasound 11/20/2020:  The right kidney measures 9.8 x 5.4 x 3.9 cm and is unremarkable.  There is no right hydronephrosis.  The left kidney measures 9.9 x 5.1 x 5.0 cm and demonstrates a 9 mm  simple cyst. There is no left hydronephrosis. The left kidney is  otherwise unremarkable.     IMPRESSION:      1. 9 mm simple left renal cyst.  2. Otherwise unremarkable exam.    Impression and Plan     CKD stage G3b/A3, GFR 30-44 and albumin creatinine ratio >300 mg/g  -     Comprehensive Metabolic Panel; Future; Expected date: 07/31/2022  -     Protein/Creatinine Ratio, Urine; Future; Expected date: 07/31/2022  -     Urinalysis; Future; Expected date: 07/31/2022  Renal indices are stable. Proteinuria is in the nephrotic range. Proteinuria work-up revealed negative SIMRAN, negative ANCA,  negative HIV, negative hepatitis C antibody, positive hepatitis B surface antigen, and SPEP positive for M spike. Imaging of the kidneys was unremarkable.   Skeletal survey on 11/23/2021 revealed no suspicious bony lesions. Patient has established care in ID clinic for management of chronic inactive hepatitis B, currently not on treatment. She is followed by hematology clinic for MGUS.  Continue risk factor management in periodic monitoring.  Return to clinic with routine labs in 4 months.    Primary hypertension  Blood pressure is at goal.  Continue current medication regimen.    Hyperuricemia  Continue allopurinol as prescribed.    BMI 45.0-49.9, adult  Lifestyle and dietary interventions discussed, patient counseled on weight loss using portion control, non sedentary lifestyle, low-carbohydrate/low fat diet.    Other orders  -     allopurinoL (ZYLOPRIM) 100 MG tablet; Take 1 tablet (100 mg total) by mouth once daily.  Dispense: 90 tablet; Refill: 3  -     aspirin (ECOTRIN) 81 MG EC tablet; Take 1 tablet (81 mg total) by mouth once daily.  Dispense: 90 tablet; Refill: 3  -     hydrALAZINE (APRESOLINE) 25 MG tablet; Take 1 tablet (25 mg total) by mouth 2 (two) times a day.  Dispense: 180 tablet; Refill: 3  -     irbesartan-hydrochlorothiazide (AVALIDE) 150-12.5 mg per tablet; Take 1 tablet by mouth once daily.  Dispense: 90 tablet; Refill: 3  -     levocetirizine (XYZAL) 5 MG tablet; Take 1 tablet (5 mg total) by mouth every evening.  Dispense: 90 tablet; Refill: 3  -     metoprolol tartrate (LOPRESSOR) 25 MG tablet; Take 1 tablet (25 mg total) by mouth 2 (two) times a day.  Dispense: 180 tablet; Refill: 3

## 2023-02-06 ENCOUNTER — APPOINTMENT (OUTPATIENT)
Dept: LAB | Facility: HOSPITAL | Age: 53
End: 2023-02-06
Attending: NURSE PRACTITIONER
Payer: COMMERCIAL

## 2023-02-13 ENCOUNTER — OFFICE VISIT (OUTPATIENT)
Dept: NEPHROLOGY | Facility: CLINIC | Age: 53
End: 2023-02-13
Payer: COMMERCIAL

## 2023-02-13 VITALS
RESPIRATION RATE: 20 BRPM | HEIGHT: 63 IN | TEMPERATURE: 98 F | BODY MASS INDEX: 49.26 KG/M2 | WEIGHT: 278 LBS | DIASTOLIC BLOOD PRESSURE: 78 MMHG | OXYGEN SATURATION: 97 % | HEART RATE: 86 BPM | SYSTOLIC BLOOD PRESSURE: 141 MMHG

## 2023-02-13 DIAGNOSIS — N18.32 CKD STAGE G3B/A3, GFR 30-44 AND ALBUMIN CREATININE RATIO >300 MG/G: Primary | ICD-10-CM

## 2023-02-13 DIAGNOSIS — I10 PRIMARY HYPERTENSION: ICD-10-CM

## 2023-02-13 DIAGNOSIS — M54.50 CHRONIC LOW BACK PAIN WITHOUT SCIATICA, UNSPECIFIED BACK PAIN LATERALITY: ICD-10-CM

## 2023-02-13 DIAGNOSIS — E79.0 HYPERURICEMIA: ICD-10-CM

## 2023-02-13 DIAGNOSIS — G89.29 CHRONIC LOW BACK PAIN WITHOUT SCIATICA, UNSPECIFIED BACK PAIN LATERALITY: ICD-10-CM

## 2023-02-13 PROCEDURE — 1160F PR REVIEW ALL MEDS BY PRESCRIBER/CLIN PHARMACIST DOCUMENTED: ICD-10-PCS | Mod: CPTII,,, | Performed by: NURSE PRACTITIONER

## 2023-02-13 PROCEDURE — 99214 PR OFFICE/OUTPT VISIT, EST, LEVL IV, 30-39 MIN: ICD-10-PCS | Mod: S$PBB,,, | Performed by: NURSE PRACTITIONER

## 2023-02-13 PROCEDURE — 1159F PR MEDICATION LIST DOCUMENTED IN MEDICAL RECORD: ICD-10-PCS | Mod: CPTII,,, | Performed by: NURSE PRACTITIONER

## 2023-02-13 PROCEDURE — 99214 OFFICE O/P EST MOD 30 MIN: CPT | Mod: PBBFAC | Performed by: NURSE PRACTITIONER

## 2023-02-13 PROCEDURE — 3008F BODY MASS INDEX DOCD: CPT | Mod: CPTII,,, | Performed by: NURSE PRACTITIONER

## 2023-02-13 PROCEDURE — 3078F DIAST BP <80 MM HG: CPT | Mod: CPTII,,, | Performed by: NURSE PRACTITIONER

## 2023-02-13 PROCEDURE — 99214 OFFICE O/P EST MOD 30 MIN: CPT | Mod: S$PBB,,, | Performed by: NURSE PRACTITIONER

## 2023-02-13 PROCEDURE — 3078F PR MOST RECENT DIASTOLIC BLOOD PRESSURE < 80 MM HG: ICD-10-PCS | Mod: CPTII,,, | Performed by: NURSE PRACTITIONER

## 2023-02-13 PROCEDURE — 1159F MED LIST DOCD IN RCRD: CPT | Mod: CPTII,,, | Performed by: NURSE PRACTITIONER

## 2023-02-13 PROCEDURE — 3077F PR MOST RECENT SYSTOLIC BLOOD PRESSURE >= 140 MM HG: ICD-10-PCS | Mod: CPTII,,, | Performed by: NURSE PRACTITIONER

## 2023-02-13 PROCEDURE — 3008F PR BODY MASS INDEX (BMI) DOCUMENTED: ICD-10-PCS | Mod: CPTII,,, | Performed by: NURSE PRACTITIONER

## 2023-02-13 PROCEDURE — 3066F NEPHROPATHY DOC TX: CPT | Mod: CPTII,,, | Performed by: NURSE PRACTITIONER

## 2023-02-13 PROCEDURE — 1160F RVW MEDS BY RX/DR IN RCRD: CPT | Mod: CPTII,,, | Performed by: NURSE PRACTITIONER

## 2023-02-13 PROCEDURE — 3077F SYST BP >= 140 MM HG: CPT | Mod: CPTII,,, | Performed by: NURSE PRACTITIONER

## 2023-02-13 PROCEDURE — 3066F PR DOCUMENTATION OF TREATMENT FOR NEPHROPATHY: ICD-10-PCS | Mod: CPTII,,, | Performed by: NURSE PRACTITIONER

## 2023-02-13 RX ORDER — METOPROLOL TARTRATE 25 MG/1
25 TABLET, FILM COATED ORAL 2 TIMES DAILY
Qty: 180 TABLET | Refills: 3 | Status: SHIPPED | OUTPATIENT
Start: 2023-02-13 | End: 2023-11-28

## 2023-02-13 RX ORDER — CYCLOBENZAPRINE HCL 5 MG
5 TABLET ORAL NIGHTLY
Qty: 30 TABLET | Refills: 0 | Status: SHIPPED | OUTPATIENT
Start: 2023-02-13 | End: 2023-03-15

## 2023-02-13 RX ORDER — AMLODIPINE BESYLATE 10 MG/1
10 TABLET ORAL DAILY
Qty: 90 TABLET | Refills: 3 | Status: SHIPPED | OUTPATIENT
Start: 2023-02-13 | End: 2024-02-24

## 2023-02-13 RX ORDER — ALLOPURINOL 100 MG/1
100 TABLET ORAL DAILY
Qty: 90 TABLET | Refills: 3 | Status: SHIPPED | OUTPATIENT
Start: 2023-02-13 | End: 2024-03-08

## 2023-02-13 RX ORDER — HYDROCHLOROTHIAZIDE 25 MG/1
25 TABLET ORAL DAILY
Qty: 90 TABLET | Refills: 3 | Status: SHIPPED | OUTPATIENT
Start: 2023-02-13 | End: 2023-11-28

## 2023-02-13 RX ORDER — IRBESARTAN 150 MG/1
150 TABLET ORAL NIGHTLY
Qty: 90 TABLET | Refills: 3 | Status: SHIPPED | OUTPATIENT
Start: 2023-02-13 | End: 2024-03-08

## 2023-02-13 RX ORDER — HYDRALAZINE HYDROCHLORIDE 25 MG/1
25 TABLET, FILM COATED ORAL 2 TIMES DAILY
Qty: 180 TABLET | Refills: 3 | Status: SHIPPED | OUTPATIENT
Start: 2023-02-13 | End: 2024-03-08

## 2023-02-13 RX ORDER — LEVOCETIRIZINE DIHYDROCHLORIDE 5 MG/1
5 TABLET, FILM COATED ORAL NIGHTLY
Qty: 90 TABLET | Refills: 3 | Status: SHIPPED | OUTPATIENT
Start: 2023-02-13 | End: 2024-03-08

## 2023-02-13 NOTE — PROGRESS NOTES
"  [facility]  Nephrology Clinic Note    Chief complaint: Chronic Kidney Disease (Follow up)    History of present illness:   Courtney Easley is a 52 y.o. Black or  female with past medical history of  CKD, hypertension, sickle cell trait, anemia, left ventricular hypertrophy, IGT, hepatitis B, and morbid obesity. Patient presents for follow-up appointment in nephrology clinic today. C/o muscle spasms, requests refills of muscle relaxants.     Review of Systems  12 point review of systems conducted, negative except as stated in the history of present illness.    Allergies: Patient has No Known Allergies.     Past Medical History:  has a past medical history of Anemia, unspecified, CKD (chronic kidney disease), HTN (hypertension), IGT (impaired glucose tolerance), Left ventricular hypertrophy, Morbid obesity, and Sickle cell trait.    Procedure History:  has a past surgical history that includes Cystoscopy (07/19/2018); Cystourethroscopy (07/19/2018); Hysterectomy (07/19/2018); Salpingectomy (Bilateral, 07/19/2018); and Tubal ligation.    Family History: family history includes Aneurysm in her sister; Cancer in her mother; Diabetes in her sister; Heart disease in her brother; Hypertension in her brother and father.    Social History:  reports that she has never smoked. She has never used smokeless tobacco. She reports current alcohol use of about 6.0 standard drinks per week. She reports that she does not use drugs.    Physical exam  BP (!) 141/78 (BP Location: Right arm, Patient Position: Sitting, BP Method: Medium (Automatic))   Pulse 86   Temp 98.3 °F (36.8 °C) (Oral)   Resp 20   Ht 5' 3" (1.6 m)   Wt 126.1 kg (278 lb)   SpO2 97%   BMI 49.25 kg/m²   General appearance: Patient is in no acute distress.  Skin: No rashes or wounds.  HEENT: PERRLA, EOMI, no scleral icterus, no JVD. Neck is supple.  Chest: Respirations are unlabored. Lungs sounds are clear. "   Heart: S1, S2.   Abdomen: Benign. Obese  : Deferred.  Extremities: BLE trace pitting edema, peripheral pulses are palpable.   Neuro: No focal deficits.     Home Medications:    Current Outpatient Medications:     aspirin (ECOTRIN) 81 MG EC tablet, Take 1 tablet (81 mg total) by mouth once daily., Disp: 90 tablet, Rfl: 3    allopurinoL (ZYLOPRIM) 100 MG tablet, Take 1 tablet (100 mg total) by mouth once daily., Disp: 90 tablet, Rfl: 3    amLODIPine (NORVASC) 10 MG tablet, Take 1 tablet (10 mg total) by mouth once daily., Disp: 90 tablet, Rfl: 3    cyclobenzaprine (FLEXERIL) 5 MG tablet, Take 1 tablet (5 mg total) by mouth nightly., Disp: 30 tablet, Rfl: 0    hydrALAZINE (APRESOLINE) 25 MG tablet, Take 1 tablet (25 mg total) by mouth 2 (two) times a day., Disp: 180 tablet, Rfl: 3    hydroCHLOROthiazide (HYDRODIURIL) 25 MG tablet, Take 1 tablet (25 mg total) by mouth once daily., Disp: 90 tablet, Rfl: 3    irbesartan (AVAPRO) 150 MG tablet, Take 1 tablet (150 mg total) by mouth every evening., Disp: 90 tablet, Rfl: 3    levocetirizine (XYZAL) 5 MG tablet, Take 1 tablet (5 mg total) by mouth every evening., Disp: 90 tablet, Rfl: 3    metoprolol tartrate (LOPRESSOR) 25 MG tablet, Take 1 tablet (25 mg total) by mouth 2 (two) times a day., Disp: 180 tablet, Rfl: 3    Laboratory data    Hematology  Lab Results   Component Value Date    WBC 7.2 11/04/2021    HGB 13.7 11/04/2021    HCT 41.4 11/04/2021     11/04/2021     Chemistry  Lab Results   Component Value Date     (L) 02/06/2023    K 4.2 02/06/2023    CHLORIDE 101 02/06/2023    CO2 25 02/06/2023    BUN 18.1 02/06/2023    CREATININE 1.85 (H) 02/06/2023    EGFRNORACEVR 32 02/06/2023    GLUCOSE 131 (H) 02/06/2023    CALCIUM 9.9 02/06/2023    ALKPHOS 83 02/06/2023    LABPROT 7.0 02/06/2023    ALBUMIN 3.4 (L) 02/06/2023    BILIDIR 0.2 02/04/2022    IBILI 0.40 02/04/2022    AST 16 02/06/2023    ALT 15 02/06/2023      Urine:  Lab Results   Component Value Date     COLORUA Colorless (A) 02/06/2023    APPEARANCEUA Clear 02/06/2023    SGUA 1.003 02/06/2023    PHUA 5.0 02/06/2023    PROTEINUA Trace (A) 02/06/2023    GLUCOSEUA Normal 02/06/2023    KETONESUA Negative 02/06/2023    BLOODUA Negative 02/06/2023    NITRITESUA Negative 02/06/2023    LEUKOCYTESUR Negative 02/06/2023    RBCUA 0-5 02/06/2023    WBCUA None Seen 02/06/2023    BACTERIA Occ (A) 02/06/2023    SQEPUA Trace (A) 02/06/2023    HYALINECASTS 0-2 (A) 02/06/2023    CREATRANDUR 22.2 (L) 02/06/2023    PROTEINURINE 21.1 02/06/2023    UPROTCREA 1.0 02/06/2023         Lab Results   Component Value Date    HGBA1C 6.9 (H) 10/12/2020    GLUCOSE 131 (H) 02/06/2023      Lab Results   Component Value Date    MSPIKEPCT 3.51 06/08/2021    HIV Nonreactive 02/19/2021    HEPCAB Nonreactive 02/19/2021    HEPBSURFAG Reactive (A) 11/04/2021    HEPBSAB Nonreactive 11/04/2021    HEPBCAB Reactive (A) 02/19/2021         Imaging  US Retroperitoneal Limited 11/30/2020   The right kidney measures 9.8 x 5.4 x 3.9 cm and is unremarkable.  There is no right hydronephrosis.  The left kidney measures 9.9 x 5.1 x 5.0 cm and demonstrates a 9 mm  simple cyst. There is no left hydronephrosis. The left kidney is  otherwise unremarkable.    IMPRESSION:  1. 9 mm simple left renal cyst.  2. Otherwise unremarkable exam.  Electronically Signed By: Kali Juárez MD  Date/Time Signed: 11/30/2020 12:13      Impression and plan     CKD stage G3b/A3, GFR 30-44 and albumin creatinine ratio >300 mg/g  -     Comprehensive Metabolic Panel; Future; Expected date: 07/13/2023  -     Protein/Creatinine Ratio, Urine; Future; Expected date: 07/13/2023  -     Urinalysis, Reflex to Urine Culture; Future; Expected date: 07/13/2023  Renal indices are stable. Proteinuria is in the nephrotic range. Proteinuria work-up revealed negative SIMRAN, negative ANCA, negative HIV, negative hepatitis C antibody, positive hepatitis B surface antigen, and SPEP positive for M spike.  Imaging of the kidneys was unremarkable.   Skeletal survey on 11/23/2021 revealed no suspicious bony lesions. Patient has established care in ID clinic for management of chronic inactive hepatitis B, currently not on treatment. She is followed by hematology clinic for MGUS.  Continue risk factor management in periodic monitoring.  Return to clinic with routine labs in 6  months.    Chronic low back pain without sciatica, unspecified back pain laterality  -     cyclobenzaprine (FLEXERIL) 5 MG tablet; Take 1 tablet (5 mg total) by mouth nightly.  Dispense: 30 tablet; Refill: 0  Will treat with as needed cyclobenzaprine     Hyperuricemia  -     allopurinoL (ZYLOPRIM) 100 MG tablet; Take 1 tablet (100 mg total) by mouth once daily.  Dispense: 90 tablet; Refill: 3  Continue allopurinoL     Primary hypertension  -     irbesartan (AVAPRO) 150 MG tablet; Take 1 tablet (150 mg total) by mouth every evening.  Dispense: 90 tablet; Refill: 3  -     hydroCHLOROthiazide (HYDRODIURIL) 25 MG tablet; Take 1 tablet (25 mg total) by mouth once daily.  Dispense: 90 tablet; Refill: 3  -     amLODIPine (NORVASC) 10 MG tablet; Take 1 tablet (10 mg total) by mouth once daily.  Dispense: 90 tablet; Refill: 3  -     hydrALAZINE (APRESOLINE) 25 MG tablet; Take 1 tablet (25 mg total) by mouth 2 (two) times a day.  Dispense: 180 tablet; Refill: 3  -     metoprolol tartrate (LOPRESSOR) 25 MG tablet; Take 1 tablet (25 mg total) by mouth 2 (two) times a day.  Dispense: 180 tablet; Refill: 3  BP is above goal. Will increase HCTZ to 25 mg a day.     BMI 45.0-49.9, adult  Lifestyle and dietary interventions discussed, patient counseled on weight loss using portion control, non- sedentary lifestyle, low-carbohydrate/low fat diet.    Other orders  -     levocetirizine (XYZAL) 5 MG tablet; Take 1 tablet (5 mg total) by mouth every evening.  Dispense: 90 tablet; Refill: 3        2/13/2023  Ketty Dumas NP  [facility]

## 2023-03-06 ENCOUNTER — OFFICE VISIT (OUTPATIENT)
Dept: FAMILY MEDICINE | Facility: CLINIC | Age: 53
End: 2023-03-06
Payer: COMMERCIAL

## 2023-03-06 VITALS
HEIGHT: 63 IN | TEMPERATURE: 98 F | WEIGHT: 278 LBS | RESPIRATION RATE: 18 BRPM | OXYGEN SATURATION: 99 % | BODY MASS INDEX: 49.26 KG/M2 | DIASTOLIC BLOOD PRESSURE: 83 MMHG | SYSTOLIC BLOOD PRESSURE: 129 MMHG | HEART RATE: 106 BPM

## 2023-03-06 DIAGNOSIS — I10 PRIMARY HYPERTENSION: ICD-10-CM

## 2023-03-06 DIAGNOSIS — Z12.12 ENCOUNTER FOR COLORECTAL CANCER SCREENING: ICD-10-CM

## 2023-03-06 DIAGNOSIS — Z12.11 ENCOUNTER FOR COLORECTAL CANCER SCREENING: ICD-10-CM

## 2023-03-06 DIAGNOSIS — Z76.89 ENCOUNTER TO ESTABLISH CARE: ICD-10-CM

## 2023-03-06 DIAGNOSIS — M10.9 GOUT, UNSPECIFIED CAUSE, UNSPECIFIED CHRONICITY, UNSPECIFIED SITE: ICD-10-CM

## 2023-03-06 DIAGNOSIS — Z12.31 ENCOUNTER FOR SCREENING MAMMOGRAM FOR BREAST CANCER: ICD-10-CM

## 2023-03-06 DIAGNOSIS — E11.65 TYPE 2 DIABETES MELLITUS WITH HYPERGLYCEMIA, WITHOUT LONG-TERM CURRENT USE OF INSULIN: Primary | ICD-10-CM

## 2023-03-06 DIAGNOSIS — N18.32 STAGE 3B CHRONIC KIDNEY DISEASE: ICD-10-CM

## 2023-03-06 LAB
EST. AVERAGE GLUCOSE BLD GHB EST-MCNC: 168.6 MG/DL
HBA1C MFR BLD: 7.5 %
URATE SERPL-MCNC: 8.5 MG/DL (ref 2.6–6)

## 2023-03-06 PROCEDURE — 84550 ASSAY OF BLOOD/URIC ACID: CPT | Performed by: NURSE PRACTITIONER

## 2023-03-06 PROCEDURE — 3074F PR MOST RECENT SYSTOLIC BLOOD PRESSURE < 130 MM HG: ICD-10-PCS | Mod: CPTII,,, | Performed by: NURSE PRACTITIONER

## 2023-03-06 PROCEDURE — 3066F NEPHROPATHY DOC TX: CPT | Mod: CPTII,,, | Performed by: NURSE PRACTITIONER

## 2023-03-06 PROCEDURE — 99204 PR OFFICE/OUTPT VISIT, NEW, LEVL IV, 45-59 MIN: ICD-10-PCS | Mod: S$PBB,,, | Performed by: NURSE PRACTITIONER

## 2023-03-06 PROCEDURE — 3074F SYST BP LT 130 MM HG: CPT | Mod: CPTII,,, | Performed by: NURSE PRACTITIONER

## 2023-03-06 PROCEDURE — 3079F PR MOST RECENT DIASTOLIC BLOOD PRESSURE 80-89 MM HG: ICD-10-PCS | Mod: CPTII,,, | Performed by: NURSE PRACTITIONER

## 2023-03-06 PROCEDURE — 3079F DIAST BP 80-89 MM HG: CPT | Mod: CPTII,,, | Performed by: NURSE PRACTITIONER

## 2023-03-06 PROCEDURE — 83036 HEMOGLOBIN GLYCOSYLATED A1C: CPT | Performed by: NURSE PRACTITIONER

## 2023-03-06 PROCEDURE — 99204 OFFICE O/P NEW MOD 45 MIN: CPT | Mod: S$PBB,,, | Performed by: NURSE PRACTITIONER

## 2023-03-06 PROCEDURE — 36415 COLL VENOUS BLD VENIPUNCTURE: CPT | Performed by: NURSE PRACTITIONER

## 2023-03-06 PROCEDURE — 1159F PR MEDICATION LIST DOCUMENTED IN MEDICAL RECORD: ICD-10-PCS | Mod: CPTII,,, | Performed by: NURSE PRACTITIONER

## 2023-03-06 PROCEDURE — 3008F PR BODY MASS INDEX (BMI) DOCUMENTED: ICD-10-PCS | Mod: CPTII,,, | Performed by: NURSE PRACTITIONER

## 2023-03-06 PROCEDURE — 1159F MED LIST DOCD IN RCRD: CPT | Mod: CPTII,,, | Performed by: NURSE PRACTITIONER

## 2023-03-06 PROCEDURE — 3066F PR DOCUMENTATION OF TREATMENT FOR NEPHROPATHY: ICD-10-PCS | Mod: CPTII,,, | Performed by: NURSE PRACTITIONER

## 2023-03-06 PROCEDURE — 1160F PR REVIEW ALL MEDS BY PRESCRIBER/CLIN PHARMACIST DOCUMENTED: ICD-10-PCS | Mod: CPTII,,, | Performed by: NURSE PRACTITIONER

## 2023-03-06 PROCEDURE — 99215 OFFICE O/P EST HI 40 MIN: CPT | Mod: PBBFAC | Performed by: NURSE PRACTITIONER

## 2023-03-06 PROCEDURE — 4010F PR ACE/ARB THEARPY RXD/TAKEN: ICD-10-PCS | Mod: CPTII,,, | Performed by: NURSE PRACTITIONER

## 2023-03-06 PROCEDURE — 1160F RVW MEDS BY RX/DR IN RCRD: CPT | Mod: CPTII,,, | Performed by: NURSE PRACTITIONER

## 2023-03-06 PROCEDURE — 4010F ACE/ARB THERAPY RXD/TAKEN: CPT | Mod: CPTII,,, | Performed by: NURSE PRACTITIONER

## 2023-03-06 PROCEDURE — 3008F BODY MASS INDEX DOCD: CPT | Mod: CPTII,,, | Performed by: NURSE PRACTITIONER

## 2023-03-06 RX ORDER — DAPAGLIFLOZIN 5 MG/1
5 TABLET, FILM COATED ORAL DAILY
Qty: 30 TABLET | Refills: 3 | Status: SHIPPED | OUTPATIENT
Start: 2023-03-06 | End: 2023-03-10

## 2023-03-06 NOTE — ASSESSMENT & PLAN NOTE
hemoglobin A1c today is 7.5% with estimated average blood glucose at 168.  Previously on October 12, 2022 her hemoglobin 1 AC was 6.9% with estimated average blood glucose at 151 and was never treated.  Discussed diabetic diet meal planning, weight, exercise, stay hydrated with water, will initiate Rx Farxiga 5 mg p.o. once daily.  Patient will return in 3 months for a follow-up.  Patient to read discharge education material.

## 2023-03-06 NOTE — PROGRESS NOTES
Patient Name: Courtney Easley   : 1970  MRN: [id number]     SUBJECTIVE DATA:    CHIEF COMPLAINT:   Courtney Easley is a 52 y.o. female who presents to clinic today with Establish Care and Joint Swelling        HPI: 52-year-old -American female presents to the clinic to establish care. with past medical history of CKD, hypertension, sickle cell trait, anemia, left ventricular hypertrophy, IGT, hepatitis B, and morbid obesity.     Gout:  Patient states she has a history of gout, currently she is taking allopurinol 100 mg p.o. once daily.  Patient state she is been having joint pain that started with her left wrist a month ago and then progressed to her elbows then lower back and now it is all over.  Denies any recent fall or trauma.  Uric acid today is 8.5 elevated than her previous uric acid from a year ago at 7.7.  Low purine diet education material provided.  Instructed patient to stay hydrated with fluids, lose weight, exercise at least 30 minutes a day up to 5 days a week as simple as brisk walking, follow dash diet, follow low-cholesterol, low-fat diet.    BMI/diabetes:  49.25, hemoglobin A1c today is 7.5% with estimated average blood glucose at 168.  Previously on 2022 her hemoglobin 1 AC was 6.9% with estimated average blood glucose at 151 and was never treated.  Discussed diabetic diet meal planning, weight, exercise, stay hydrated with water, will initiate Rx Farxiga 5 mg p.o. once daily.  Patient will return in 3 months for a follow-up.    Hypertension:  Controlled, 129/83, patient currently on Norvasc 10 mg p.o. once daily, hydralazine 25 mg p.o. b.i.d., hydrochlorothiazide 25 mg p.o. once daily, Lopressor 25 mg p.o. b.i.d.,irbesarta 150 po at bedtime.  Continue to follow low-salt, low-cholesterol, low-fat diet.    Care gaps:  Declines immunization.  Colorectal cancer screening referral to Dr. Martinez has been initiated.    Patient denies chest pain, shortness of  "breath, dyspnea on exertion, palpitations, peripheral edema, abdominal pain, nausea, vomiting, diarrhea, constipation, fatigue, fever, chills, dysuria,  hematuria, melena, or hematochezia.      HPI        ALLERGIES: Review of patient's allergies indicates:  No Known Allergies      ROS:  Review of Systems   Musculoskeletal:  Positive for joint pain.   All other systems reviewed and are negative.      OBJECTIVE DATA:  Vital signs  Vitals:    03/06/23 0810   BP: 129/83   Pulse: 106   Resp: 18   Temp: 97.8 °F (36.6 °C)   TempSrc: Oral   SpO2: 99%   Weight: 126.1 kg (278 lb)   Height: 5' 3" (1.6 m)      Body mass index is 49.25 kg/m².    PHYSICAL EXAM:   Physical Exam  Vitals and nursing note reviewed.   Constitutional:       General: She is awake. She is not in acute distress.     Appearance: Normal appearance. She is well-developed and well-groomed. She is morbidly obese. She is not ill-appearing, toxic-appearing or diaphoretic.   HENT:      Head: Normocephalic and atraumatic.      Right Ear: Tympanic membrane, ear canal and external ear normal. There is no impacted cerumen.      Left Ear: Tympanic membrane, ear canal and external ear normal. There is no impacted cerumen.      Nose: Nose normal. No congestion or rhinorrhea.      Mouth/Throat:      Lips: Pink.      Mouth: Mucous membranes are moist.      Pharynx: Oropharynx is clear. Uvula midline. No posterior oropharyngeal erythema.   Eyes:      General: Lids are normal. Gaze aligned appropriately.      Extraocular Movements: Extraocular movements intact.      Conjunctiva/sclera: Conjunctivae normal.      Pupils: Pupils are equal, round, and reactive to light.   Neck:      Thyroid: No thyroid mass, thyromegaly or thyroid tenderness.      Trachea: Trachea and phonation normal.   Cardiovascular:      Rate and Rhythm: Normal rate and regular rhythm.      Pulses: Normal pulses.           Radial pulses are 2+ on the right side and 2+ on the left side.      Heart sounds: " Normal heart sounds. No murmur heard.  Pulmonary:      Effort: Pulmonary effort is normal.      Breath sounds: Normal breath sounds and air entry. No wheezing or rhonchi.   Abdominal:      General: Bowel sounds are normal.      Palpations: Abdomen is soft.      Tenderness: There is no abdominal tenderness. There is no right CVA tenderness or left CVA tenderness.   Musculoskeletal:         General: No swelling, tenderness, deformity or signs of injury. Normal range of motion.      Cervical back: Normal range of motion and neck supple. No rigidity or tenderness.      Right lower leg: No edema.      Left lower leg: No edema.   Lymphadenopathy:      Cervical: No cervical adenopathy.   Skin:     General: Skin is warm and dry.      Capillary Refill: Capillary refill takes less than 2 seconds.      Findings: No rash.   Neurological:      General: No focal deficit present.      Mental Status: She is alert and oriented to person, place, and time. Mental status is at baseline.      GCS: GCS eye subscore is 4. GCS verbal subscore is 5. GCS motor subscore is 6.      Cranial Nerves: Cranial nerves 2-12 are intact.      Sensory: Sensation is intact.      Coordination: Coordination is intact.      Gait: Gait is intact.   Psychiatric:         Attention and Perception: Attention normal.         Mood and Affect: Mood normal.         Speech: Speech normal.         Behavior: Behavior normal. Behavior is cooperative.         Thought Content: Thought content normal.         Cognition and Memory: Cognition and memory normal.         Judgment: Judgment normal.        ASSESSMENT/PLAN:  1. Type 2 diabetes mellitus with hyperglycemia, without long-term current use of insulin  Assessment & Plan:  hemoglobin A1c today is 7.5% with estimated average blood glucose at 168.  Previously on October 12, 2022 her hemoglobin 1 AC was 6.9% with estimated average blood glucose at 151 and was never treated.  Discussed diabetic diet meal planning, weight,  exercise, stay hydrated with water, will initiate Rx Farxiga 5 mg p.o. once daily.  Patient will return in 3 months for a follow-up.  Patient to read discharge education material.    Orders:  -     dapagliflozin (FARXIGA) 5 mg Tab tablet; Take 1 tablet (5 mg total) by mouth once daily.  Dispense: 30 tablet; Refill: 3    2. Gout, unspecified cause, unspecified chronicity, unspecified site  Assessment & Plan:  Uric acid today is 8.5 elevated than her previous uric acid from a year ago at 7.7.  Low purine diet education material provided.  Instructed patient to stay hydrated with fluids, lose weight, exercise at least 30 minutes a day up to 5 days a week as simple as brisk walking, follow dash diet, follow low-cholesterol, low-fat diet.  Continue allopurinol 100 mg p.o. once daily as directed.  Patient to read discharge education material.      3. Primary hypertension    4. Stage 3b chronic kidney disease    5. BMI 45.0-49.9, adult  -     Hemoglobin A1C  -     dapagliflozin (FARXIGA) 5 mg Tab tablet; Take 1 tablet (5 mg total) by mouth once daily.  Dispense: 30 tablet; Refill: 3    6. Encounter to establish care  -     Uric Acid  -     Mammo Digital Screening Bilat w/ Felipe; Future; Expected date: 03/06/2023  -     Hemoglobin A1C  -     Ambulatory referral/consult to Gastroenterology; Future; Expected date: 03/13/2023  -     dapagliflozin (FARXIGA) 5 mg Tab tablet; Take 1 tablet (5 mg total) by mouth once daily.  Dispense: 30 tablet; Refill: 3    7. Encounter for colorectal cancer screening  -     Ambulatory referral/consult to Gastroenterology; Future; Expected date: 03/13/2023    8. Encounter for screening mammogram for breast cancer  -     Mammo Digital Screening Bilat w/ Felipe; Future; Expected date: 03/06/2023           RESULTS:  Recent Results (from the past 1008 hour(s))   Comprehensive Metabolic Panel    Collection Time: 02/06/23  9:53 AM   Result Value Ref Range    Sodium Level 134 (L) 136 - 145 mmol/L     Potassium Level 4.2 3.5 - 5.1 mmol/L    Chloride 101 98 - 107 mmol/L    Carbon Dioxide 25 22 - 29 mmol/L    Glucose Level 131 (H) 74 - 100 mg/dL    Blood Urea Nitrogen 18.1 9.8 - 20.1 mg/dL    Creatinine 1.85 (H) 0.55 - 1.02 mg/dL    Calcium Level Total 9.9 8.4 - 10.2 mg/dL    Protein Total 7.0 6.4 - 8.3 gm/dL    Albumin Level 3.4 (L) 3.5 - 5.0 g/dL    Globulin 3.6 (H) 2.4 - 3.5 gm/dL    Albumin/Globulin Ratio 0.9 (L) 1.1 - 2.0 ratio    Bilirubin Total 0.3 <=1.5 mg/dL    Alkaline Phosphatase 83 40 - 150 unit/L    Alanine Aminotransferase 15 0 - 55 unit/L    Aspartate Aminotransferase 16 5 - 34 unit/L    eGFR 32 mls/min/1.73/m2   Protein/Creatinine Ratio, Urine    Collection Time: 02/06/23  9:53 AM   Result Value Ref Range    Urine Protein Level 21.1 mg/dL    Urine Creatinine 22.2 (L) 47.0 - 110.0 mg/dL    Urine Protein/Creatinine Ratio 1.0    Urinalysis, Reflex to Urine Culture Urine, Clean Catch    Collection Time: 02/06/23  9:53 AM    Specimen: Urine   Result Value Ref Range    Color, UA Colorless (A) Yellow, Light-Yellow, Dark Yellow, Saskia, Straw    Appearance, UA Clear Clear    Specific Gravity, UA 1.003     pH, UA 5.0 5.0 - 8.5    Protein, UA Trace (A) Negative mg/dL    Glucose, UA Normal Negative, Normal mg/dL    Ketones, UA Negative Negative mg/dL    Blood, UA Negative Negative unit/L    Bilirubin, UA Negative Negative mg/dL    Urobilinogen, UA Normal 0.2, 1.0, Normal mg/dL    Nitrites, UA Negative Negative    Leukocyte Esterase, UA Negative Negative unit/L    WBC, UA None Seen None Seen, 0-2, 3-5, 0-5 /HPF    Bacteria, UA Occ (A) None Seen /HPF    Squamous Epithelial Cells, UA Trace (A) None Seen /HPF    Hyaline Casts, UA 0-2 (A) None Seen /lpf    RBC, UA 0-5 None Seen, 0-2, 3-5, 0-5 /HPF   Uric Acid    Collection Time: 03/06/23  8:54 AM   Result Value Ref Range    Uric Acid 8.5 (H) 2.6 - 6.0 mg/dL   Hemoglobin A1C    Collection Time: 03/06/23  8:54 AM   Result Value Ref Range    Hemoglobin A1c 7.5 (H)  <=7.0 %    Estimated Average Glucose 168.6 mg/dL         Follow Up:  Follow up in about 3 months (around 6/6/2023).      Previous medical history/lab work/radiology reviewed and considered during medical management decisions.   Medication list reviewed and medication reconciliation performed.  Patient was provided  and care about his/her current diagnosis (es) and medications including risk/benefit and side effects/adverse events, over the counter medication uses/doses, home self-care and contact precautions,  and red flags and indications for when to seek immediate medical attention.   Patient was advised to continue compliance with current medication list and medical recommendations.  Patient dvised continued compliance with recommended eating habits/ diets for medical conditions and exercise 150 minutes/ week (if possible) for medical condition (s).  Educational handouts and instructions on selected disease management in AVS (After Visit Summary).    All of the patient's questions were answered to patient's satisfaction.   The patient was receptive, expressed verbal understanding and agreement the above plan.       This note was created with the assistance of a voice recognition software or phone dictation. There may be transcription errors as a result of using this technology however minimal. Effort has been made to assure accuracy of transcription but any obvious errors or omissions should be clarified with the author of the document

## 2023-03-06 NOTE — ASSESSMENT & PLAN NOTE
Uric acid today is 8.5 elevated than her previous uric acid from a year ago at 7.7.  Low purine diet education material provided.  Instructed patient to stay hydrated with fluids, lose weight, exercise at least 30 minutes a day up to 5 days a week as simple as brisk walking, follow dash diet, follow low-cholesterol, low-fat diet.  Continue allopurinol 100 mg p.o. once daily as directed.  Patient to read discharge education material.

## 2023-03-10 RX ORDER — DAPAGLIFLOZIN 5 MG/1
5 TABLET, FILM COATED ORAL DAILY
Qty: 90 TABLET | Refills: 1 | Status: SHIPPED | OUTPATIENT
Start: 2023-03-10 | End: 2023-06-06

## 2023-03-13 ENCOUNTER — TELEPHONE (OUTPATIENT)
Dept: FAMILY MEDICINE | Facility: CLINIC | Age: 53
End: 2023-03-13
Payer: COMMERCIAL

## 2023-03-13 NOTE — TELEPHONE ENCOUNTER
----- Message from QUINN Kilpatrick sent at 3/10/2023  3:47 PM CST -----  Regarding: FW: Prescription  Please instruct patient her prescription has been sent to [facility] pharmacy, notify patient regarding price.  Ninety pills for 33 dollars.  ----- Message -----  From: Solomon Yusuf LPN  Sent: 3/6/2023   3:43 PM CST  To: QUINN Kilpatrick  Subject: FW: Prescription                                   ----- Message -----  From: Paulina Mckenzie  Sent: 3/6/2023   3:14 PM CST  To: [facility] Clinical Support Staff  Subject: Prescription                                     Patient called back to the office to say that the prescription for Dapagliflozin (Farxiga) 5 MGis too expensive and would like the Provider to prescribe something more economical.  Please and Thank You

## 2023-03-14 ENCOUNTER — TELEPHONE (OUTPATIENT)
Dept: FAMILY MEDICINE | Facility: CLINIC | Age: 53
End: 2023-03-14
Payer: COMMERCIAL

## 2023-03-14 NOTE — TELEPHONE ENCOUNTER
----- Message from Paulina Jonah sent at 3/14/2023 10:31 AM CDT -----  Regarding: Phone Call  Patient says, she's returning a phone call from yesterday. Please contact patient at [phone].  Thank You.

## 2023-04-26 LAB — CRC RECOMMENDATION EXT: NORMAL

## 2023-05-09 DIAGNOSIS — M54.50 CHRONIC LOW BACK PAIN WITHOUT SCIATICA, UNSPECIFIED BACK PAIN LATERALITY: ICD-10-CM

## 2023-05-09 DIAGNOSIS — G89.29 CHRONIC LOW BACK PAIN WITHOUT SCIATICA, UNSPECIFIED BACK PAIN LATERALITY: ICD-10-CM

## 2023-05-10 RX ORDER — CYCLOBENZAPRINE HCL 5 MG
TABLET ORAL
Qty: 30 TABLET | Refills: 0 | Status: SHIPPED | OUTPATIENT
Start: 2023-05-10 | End: 2023-06-10

## 2023-05-10 RX ORDER — IRBESARTAN AND HYDROCHLOROTHIAZIDE 150; 12.5 MG/1; MG/1
TABLET, FILM COATED ORAL
Qty: 90 TABLET | Refills: 3
Start: 2023-05-10 | End: 2023-05-10

## 2023-05-10 RX ORDER — ASPIRIN 81 MG/1
TABLET ORAL
Qty: 90 TABLET | Refills: 3 | Status: SHIPPED | OUTPATIENT
Start: 2023-05-10

## 2023-06-06 ENCOUNTER — TELEPHONE (OUTPATIENT)
Dept: FAMILY MEDICINE | Facility: CLINIC | Age: 53
End: 2023-06-06

## 2023-06-06 ENCOUNTER — OFFICE VISIT (OUTPATIENT)
Dept: FAMILY MEDICINE | Facility: CLINIC | Age: 53
End: 2023-06-06
Payer: COMMERCIAL

## 2023-06-06 VITALS
BODY MASS INDEX: 46.42 KG/M2 | RESPIRATION RATE: 18 BRPM | OXYGEN SATURATION: 97 % | TEMPERATURE: 98 F | HEART RATE: 88 BPM | DIASTOLIC BLOOD PRESSURE: 84 MMHG | HEIGHT: 63 IN | WEIGHT: 262 LBS | SYSTOLIC BLOOD PRESSURE: 121 MMHG

## 2023-06-06 DIAGNOSIS — Z13.220 ENCOUNTER FOR LIPID SCREENING FOR CARDIOVASCULAR DISEASE: ICD-10-CM

## 2023-06-06 DIAGNOSIS — E11.65 TYPE 2 DIABETES MELLITUS WITH HYPERGLYCEMIA, WITHOUT LONG-TERM CURRENT USE OF INSULIN: Primary | ICD-10-CM

## 2023-06-06 DIAGNOSIS — M10.9: ICD-10-CM

## 2023-06-06 DIAGNOSIS — Z13.6 ENCOUNTER FOR LIPID SCREENING FOR CARDIOVASCULAR DISEASE: ICD-10-CM

## 2023-06-06 DIAGNOSIS — M10.412: ICD-10-CM

## 2023-06-06 DIAGNOSIS — Z23 ENCOUNTER FOR IMMUNIZATION: ICD-10-CM

## 2023-06-06 LAB
CHOLEST SERPL-MCNC: 157 MG/DL
CHOLEST/HDLC SERPL: 5 {RATIO} (ref 0–5)
CREAT UR-MCNC: 20.4 MG/DL (ref 47–110)
EST. AVERAGE GLUCOSE BLD GHB EST-MCNC: 148.5 MG/DL
HBA1C MFR BLD: 6.8 %
HDLC SERPL-MCNC: 34 MG/DL (ref 35–60)
LDLC SERPL CALC-MCNC: 88 MG/DL (ref 50–140)
MICROALBUMIN UR-MCNC: 53.1 UG/ML
MICROALBUMIN/CREAT RATIO PNL UR: 260.3 MG/GM CR (ref 0–30)
TRIGL SERPL-MCNC: 175 MG/DL (ref 37–140)
URATE SERPL-MCNC: 9.6 MG/DL (ref 2.6–6)
VLDLC SERPL CALC-MCNC: 35 MG/DL

## 2023-06-06 PROCEDURE — 3066F NEPHROPATHY DOC TX: CPT | Mod: CPTII,,, | Performed by: NURSE PRACTITIONER

## 2023-06-06 PROCEDURE — 3074F SYST BP LT 130 MM HG: CPT | Mod: CPTII,,, | Performed by: NURSE PRACTITIONER

## 2023-06-06 PROCEDURE — 3008F BODY MASS INDEX DOCD: CPT | Mod: CPTII,,, | Performed by: NURSE PRACTITIONER

## 2023-06-06 PROCEDURE — 3079F PR MOST RECENT DIASTOLIC BLOOD PRESSURE 80-89 MM HG: ICD-10-PCS | Mod: CPTII,,, | Performed by: NURSE PRACTITIONER

## 2023-06-06 PROCEDURE — 80061 LIPID PANEL: CPT | Performed by: NURSE PRACTITIONER

## 2023-06-06 PROCEDURE — 1160F RVW MEDS BY RX/DR IN RCRD: CPT | Mod: CPTII,,, | Performed by: NURSE PRACTITIONER

## 2023-06-06 PROCEDURE — 1159F PR MEDICATION LIST DOCUMENTED IN MEDICAL RECORD: ICD-10-PCS | Mod: CPTII,,, | Performed by: NURSE PRACTITIONER

## 2023-06-06 PROCEDURE — 99214 PR OFFICE/OUTPT VISIT, EST, LEVL IV, 30-39 MIN: ICD-10-PCS | Mod: S$PBB,,, | Performed by: NURSE PRACTITIONER

## 2023-06-06 PROCEDURE — 99214 OFFICE O/P EST MOD 30 MIN: CPT | Mod: S$PBB,,, | Performed by: NURSE PRACTITIONER

## 2023-06-06 PROCEDURE — 83036 HEMOGLOBIN GLYCOSYLATED A1C: CPT | Performed by: NURSE PRACTITIONER

## 2023-06-06 PROCEDURE — 4010F ACE/ARB THERAPY RXD/TAKEN: CPT | Mod: CPTII,,, | Performed by: NURSE PRACTITIONER

## 2023-06-06 PROCEDURE — 3079F DIAST BP 80-89 MM HG: CPT | Mod: CPTII,,, | Performed by: NURSE PRACTITIONER

## 2023-06-06 PROCEDURE — 1159F MED LIST DOCD IN RCRD: CPT | Mod: CPTII,,, | Performed by: NURSE PRACTITIONER

## 2023-06-06 PROCEDURE — 90677 PCV20 VACCINE IM: CPT | Mod: PBBFAC

## 2023-06-06 PROCEDURE — 1160F PR REVIEW ALL MEDS BY PRESCRIBER/CLIN PHARMACIST DOCUMENTED: ICD-10-PCS | Mod: CPTII,,, | Performed by: NURSE PRACTITIONER

## 2023-06-06 PROCEDURE — 3066F PR DOCUMENTATION OF TREATMENT FOR NEPHROPATHY: ICD-10-PCS | Mod: CPTII,,, | Performed by: NURSE PRACTITIONER

## 2023-06-06 PROCEDURE — 3008F PR BODY MASS INDEX (BMI) DOCUMENTED: ICD-10-PCS | Mod: CPTII,,, | Performed by: NURSE PRACTITIONER

## 2023-06-06 PROCEDURE — 84550 ASSAY OF BLOOD/URIC ACID: CPT | Performed by: NURSE PRACTITIONER

## 2023-06-06 PROCEDURE — 90471 IMMUNIZATION ADMIN: CPT | Mod: PBBFAC

## 2023-06-06 PROCEDURE — 3074F PR MOST RECENT SYSTOLIC BLOOD PRESSURE < 130 MM HG: ICD-10-PCS | Mod: CPTII,,, | Performed by: NURSE PRACTITIONER

## 2023-06-06 PROCEDURE — 99215 OFFICE O/P EST HI 40 MIN: CPT | Mod: PBBFAC | Performed by: NURSE PRACTITIONER

## 2023-06-06 PROCEDURE — 82043 UR ALBUMIN QUANTITATIVE: CPT | Performed by: NURSE PRACTITIONER

## 2023-06-06 PROCEDURE — 4010F PR ACE/ARB THEARPY RXD/TAKEN: ICD-10-PCS | Mod: CPTII,,, | Performed by: NURSE PRACTITIONER

## 2023-06-06 PROCEDURE — 36415 COLL VENOUS BLD VENIPUNCTURE: CPT | Performed by: NURSE PRACTITIONER

## 2023-06-06 RX ORDER — DAPAGLIFLOZIN 10 MG/1
10 TABLET, FILM COATED ORAL DAILY
Qty: 90 TABLET | Refills: 3 | Status: SHIPPED | OUTPATIENT
Start: 2023-06-06

## 2023-06-06 RX ORDER — METFORMIN HYDROCHLORIDE 500 MG/1
500 TABLET ORAL 2 TIMES DAILY WITH MEALS
Qty: 180 TABLET | Refills: 3 | Status: SHIPPED | OUTPATIENT
Start: 2023-06-06 | End: 2023-11-21 | Stop reason: SDUPTHER

## 2023-06-06 RX ORDER — METFORMIN HYDROCHLORIDE 500 MG/1
500 TABLET ORAL 2 TIMES DAILY WITH MEALS
Qty: 180 TABLET | Refills: 3 | Status: SHIPPED | OUTPATIENT
Start: 2023-06-06 | End: 2023-06-06 | Stop reason: SDUPTHER

## 2023-06-06 RX ORDER — METOPROLOL SUCCINATE 25 MG/1
25 TABLET, EXTENDED RELEASE ORAL NIGHTLY
COMMUNITY
End: 2023-06-06

## 2023-06-06 RX ORDER — DAPAGLIFLOZIN 10 MG/1
10 TABLET, FILM COATED ORAL DAILY
Qty: 90 TABLET | Refills: 3 | Status: SHIPPED | OUTPATIENT
Start: 2023-06-06 | End: 2023-06-06 | Stop reason: SDUPTHER

## 2023-06-06 RX ADMIN — PNEUMOCOCCAL 20-VALENT CONJUGATE VACCINE 0.5 ML
2.2; 2.2; 2.2; 2.2; 2.2; 2.2; 2.2; 2.2; 2.2; 2.2; 2.2; 2.2; 2.2; 2.2; 2.2; 2.2; 4.4; 2.2; 2.2; 2.2 INJECTION, SUSPENSION INTRAMUSCULAR at 09:06

## 2023-06-06 NOTE — ASSESSMENT & PLAN NOTE
Continue allopurinol as directed.  Uric acid pending.  Follow low purine diet, read discharge education material.  Take Tylenol arthritis as directed on the label do not exceed more than 6 tablets a day.  Stay hydrated with water.  Rest, ice.  Questions solicited and answered, patient verbalized.  Return to clinic as needed.

## 2023-06-06 NOTE — TELEPHONE ENCOUNTER
Patient called stating that Farxiga and metformin were 1500 dollars. Patient would like these scripts be sent to the pharmacy here.

## 2023-06-06 NOTE — PROGRESS NOTES
Patient Name: Courtney Easley   : 1970  MRN: [id number]     SUBJECTIVE DATA:    CHIEF COMPLAINT:   Courtney Easley is a 53 y.o. female who presents to clinic today with Diabetes and Medication Refill        HPI: 53-year-old -American female presents to the clinic to follow-up on her diabetes and gout flare.  Past medical history of CKD, hypertension, sickle cell trait, anemia, left ventricular hypertrophy, IGT, hepatitis B, and morbid obesity.     Diabetes: 2023  Last hemoglobin A1c 7.5% with estimated average blood glucose 168.6.  Patient currently on Farxiga 5 mg p.o. once daily.  Discussed with patient will increase Farxiga to 10 mg p.o. once daily and add metformin 500 mg p.o. b.i.d. daily with meals.  Continue to follow diabetic diet meal planning.  Continue to follow low-salt diet less than 2 g per day.  Continue to follow low fat, low-cholesterol diet.  Increase fiber intake to 30 g per day if possible.  Stay hydrated with water.  Stay physically active at least 30 minutes a day up to 5 days a week as simple as brisk walking.  Lose Weight.  Foot exam completed.  Return to clinic in 3 months for diabetes follow-up.  Questions solicited and answered, patient verbalized.  Hemoglobin A1c, microalbumin pending.    Left shoulder /arm pain:  Patient has history of gout and currently she is taking allopurinol 100 mg p.o. once daily.  She is not following low purine diet.  Patient report left arm, left elbow and wrist pain that started about a week ago.  Denies any recent fall or trauma.  Discussed repeating uric acid.  Take Tylenol Arthritis as needed for pain.  Rest, ice.  Follow low purine diet.  Follow low-salt diet less than 2 g per day.  Stay hydrated with water.  Notify of test results when they become available. Questions solicited and answered, patient verbalized.    Medications reconciliation completed at bedside.    Care gaps:  Diabetic foot exam completed.   Colonoscopy  "completed by Dr. Enoch Myers on April 2023.  Patient to return in 3 years for repeat.   Lipid panel pending.    Patient denies chest pain, shortness of breath, dyspnea on exertion, palpitations, peripheral edema, abdominal pain, nausea, vomiting, diarrhea, constipation, fatigue, fever, chills, dysuria,  hematuria, melena, or hematochezia.   HPI      ALLERGIES: Review of patient's allergies indicates:  No Known Allergies      ROS:  Review of Systems   Musculoskeletal:  Positive for joint pain.   All other systems reviewed and are negative.      OBJECTIVE DATA:  Vital signs  Vitals:    06/06/23 0849   BP: 121/84   Pulse: 88   Resp: 18   Temp: 98 °F (36.7 °C)   TempSrc: Oral   SpO2: 97%   Weight: 118.8 kg (262 lb)   Height: 5' 3" (1.6 m)      Body mass index is 46.41 kg/m².    PHYSICAL EXAM:   Physical Exam  Vitals and nursing note reviewed.   Constitutional:       General: She is awake. She is not in acute distress.     Appearance: Normal appearance. She is well-developed and well-groomed. She is not ill-appearing, toxic-appearing or diaphoretic.   HENT:      Head: Normocephalic and atraumatic.      Right Ear: Tympanic membrane, ear canal and external ear normal. There is no impacted cerumen.      Left Ear: Tympanic membrane, ear canal and external ear normal. There is no impacted cerumen.      Nose: Nose normal. No congestion or rhinorrhea.      Mouth/Throat:      Mouth: Mucous membranes are moist.      Pharynx: Oropharynx is clear. No posterior oropharyngeal erythema.   Eyes:      General: Lids are normal. No scleral icterus.     Extraocular Movements: Extraocular movements intact.      Conjunctiva/sclera: Conjunctivae normal.      Pupils: Pupils are equal, round, and reactive to light.   Neck:      Trachea: Trachea and phonation normal.   Cardiovascular:      Rate and Rhythm: Normal rate and regular rhythm.      Pulses: Normal pulses.           Radial pulses are 2+ on the right side and 2+ on the left " side.        Dorsalis pedis pulses are 2+ on the right side and 2+ on the left side.        Posterior tibial pulses are 2+ on the right side and 2+ on the left side.      Heart sounds: Normal heart sounds. No murmur heard.  Pulmonary:      Effort: Pulmonary effort is normal.      Breath sounds: Normal breath sounds and air entry. No wheezing or rhonchi.   Abdominal:      Palpations: Abdomen is soft.      Tenderness: There is no abdominal tenderness. There is no right CVA tenderness or left CVA tenderness.   Musculoskeletal:         General: No tenderness. Normal range of motion.        Arms:       Cervical back: Normal range of motion and neck supple. No rigidity or tenderness.      Right lower leg: No edema.      Left lower leg: No edema.      Comments: Nontender with palpation, no crepitus, no step-off, no deformity.    Lymphadenopathy:      Cervical: No cervical adenopathy.   Skin:     General: Skin is warm and dry.      Capillary Refill: Capillary refill takes less than 2 seconds.      Findings: No rash.   Neurological:      General: No focal deficit present.      Mental Status: She is alert and oriented to person, place, and time. Mental status is at baseline.      GCS: GCS eye subscore is 4. GCS verbal subscore is 5. GCS motor subscore is 6.      Gait: Gait normal.   Psychiatric:         Attention and Perception: Attention and perception normal.         Mood and Affect: Mood and affect normal.         Speech: Speech normal.         Behavior: Behavior normal. Behavior is cooperative.         Thought Content: Thought content normal.         Cognition and Memory: Cognition and memory normal.         Judgment: Judgment normal.        ASSESSMENT/PLAN:  1. Type 2 diabetes mellitus with hyperglycemia, without long-term current use of insulin  Assessment & Plan:  Discussed with patient will increase Farxiga to 10 mg p.o. once daily and add metformin 500 mg p.o. b.i.d. daily with meals.  Continue to follow diabetic  diet meal planning.  Continue to follow low-salt diet less than 2 g per day.  Continue to follow low fat, low-cholesterol diet.  Increase fiber intake to 30 g per day if possible.  Stay hydrated with water.  Stay physically active at least 30 minutes a day up to 5 days a week as simple as brisk walking.  Lose Weight.  Foot exam completed.  Return to clinic in 3 months for diabetes follow-up.  Questions solicited and answered, patient verbalized.  Hemoglobin A1c, microalbumin pending.    Orders:  -     Hemoglobin A1C  -     Microalbumin/Creatinine Ratio, Urine  -     Foot Exam Performed  -     metFORMIN (GLUCOPHAGE) 500 MG tablet; Take 1 tablet (500 mg total) by mouth 2 (two) times daily with meals.  Dispense: 180 tablet; Refill: 3  -     dapagliflozin (FARXIGA) 10 mg tablet; Take 1 tablet (10 mg total) by mouth once daily.  Dispense: 90 tablet; Refill: 3    2. Gout of left shoulder, unspecified cause, unspecified chronicity  Assessment & Plan:  Continue allopurinol as directed.  Uric acid pending.  Follow low purine diet, read discharge education material.  Take Tylenol arthritis as directed on the label do not exceed more than 6 tablets a day.  Stay hydrated with water.  Rest, ice.  Questions solicited and answered, patient verbalized.  Return to clinic as needed.    Orders:  -     Uric Acid    3. Encounter for lipid screening for cardiovascular disease  -     Lipid Panel    4. Encounter for immunization  -     pneumoc 20-kerline conj-dip cr(PF) (PREVNAR-20 (PF)) injection Syrg 0.5 mL           RESULTS:  No results found for this or any previous visit (from the past 1008 hour(s)).      Follow Up:  Follow up in about 3 months (around 9/8/2023).      Previous medical history/lab work/radiology reviewed and considered during medical management decisions.   Medication list reviewed and medication reconciliation performed.  Patient was provided  and care about his/her current diagnosis (es) and medications including  risk/benefit and side effects/adverse events, over the counter medication uses/doses, home self-care and contact precautions,  and red flags and indications for when to seek immediate medical attention.   Patient was advised to continue compliance with current medication list and medical recommendations.  Patient dvised continued compliance with recommended eating habits/ diets for medical conditions and exercise 150 minutes/ week (if possible) for medical condition (s).  Educational handouts and instructions on selected disease management in AVS (After Visit Summary).    All of the patient's questions were answered to patient's satisfaction.   The patient was receptive, expressed verbal understanding and agreement the above plan.     This note was created with the assistance of a voice recognition software or phone dictation. There may be transcription errors as a result of using this technology however minimal. Effort has been made to assure accuracy of transcription but any obvious errors or omissions should be clarified with the author of the document

## 2023-06-06 NOTE — ASSESSMENT & PLAN NOTE
Discussed with patient will increase Farxiga to 10 mg p.o. once daily and add metformin 500 mg p.o. b.i.d. daily with meals.  Continue to follow diabetic diet meal planning.  Continue to follow low-salt diet less than 2 g per day.  Continue to follow low fat, low-cholesterol diet.  Increase fiber intake to 30 g per day if possible.  Stay hydrated with water.  Stay physically active at least 30 minutes a day up to 5 days a week as simple as brisk walking.  Lose Weight.  Foot exam completed.  Return to clinic in 3 months for diabetes follow-up.  Questions solicited and answered, patient verbalized.  Hemoglobin A1c, microalbumin pending.

## 2023-06-07 PROBLEM — M10.9: Status: ACTIVE | Noted: 2023-06-07

## 2023-06-07 RX ORDER — PREDNISONE 10 MG/1
10 TABLET ORAL DAILY
Qty: 3 TABLET | Refills: 0 | Status: SHIPPED | OUTPATIENT
Start: 2023-06-07 | End: 2023-06-10

## 2023-06-07 NOTE — PROGRESS NOTES
Please notify patient regarding her blood work test results and microalbumin.  Regarding her microalbumin slightly elevated.  Stay hydrated with water.  Start new diabetic medications as directed.  Hemoglobin A1c much improvement from previous 3 months currently at 6.8% with estimated average blood glucose at 148.  Continue diabetic diet meal planning and stay hydrated with water and follow direction as discussed in the clinic.  Uric acid elevated at 9.6.  Increased from 3 months ago.  Please instruct patient a prescription of prednisone 10 mg p.o. to be taken once daily for 3 days with 1st meal.  Instruct patient to increase allopurinol 100 mg 2 twice a day for 1 week and then go back to once daily.  Instruct patient to follow low purine diet help decrease gout attacks.  Stay hydrated with water.  Return to clinic in 3 months for follow-up or sooner if needed.

## 2023-06-07 NOTE — ASSESSMENT & PLAN NOTE
Rx prednisone 10 mg p.o. once daily for 3 days.  Tylenol Arthritis 650 mg take 2 tabs every 8 hours as needed for pain.  Follow low purine diet.  Increase allopurinol 100 mg p.o. b.i.d. for 7 days only and then go back to 100 mg p.o. once daily.

## 2023-06-08 ENCOUNTER — TELEPHONE (OUTPATIENT)
Dept: FAMILY MEDICINE | Facility: CLINIC | Age: 53
End: 2023-06-08
Payer: COMMERCIAL

## 2023-06-08 NOTE — TELEPHONE ENCOUNTER
Called patient to give results. Patient verbalized understanding. No additional questions at this time.             --- Message from QUINN Kilpatrick sent at 6/7/2023  4:07 PM CDT -----  Please notify patient regarding her blood work test results and microalbumin.  Regarding her microalbumin slightly elevated.  Stay hydrated with water.  Start new diabetic medications as directed.  Hemoglobin A1c much improvement from previous 3 months currently at 6.8% with estimated average blood glucose at 148.  Continue diabetic diet meal planning and stay hydrated with water and follow direction as discussed in the clinic.  Uric acid elevated at 9.6.  Increased from 3 months ago.  Please instruct patient a prescription of prednisone 10 mg p.o. to be taken once daily for 3 days with 1st meal.  Instruct patient to increase allopurinol 100 mg 2 twice a day for 1 week and then go back to once daily.  Instruct patient to follow low purine diet help decrease gout attacks.  Stay hydrated with water.  Return to clinic in 3 months for follow-up or sooner if needed.

## 2023-06-09 DIAGNOSIS — M54.50 CHRONIC LOW BACK PAIN WITHOUT SCIATICA, UNSPECIFIED BACK PAIN LATERALITY: ICD-10-CM

## 2023-06-09 DIAGNOSIS — G89.29 CHRONIC LOW BACK PAIN WITHOUT SCIATICA, UNSPECIFIED BACK PAIN LATERALITY: ICD-10-CM

## 2023-06-10 RX ORDER — CYCLOBENZAPRINE HCL 5 MG
TABLET ORAL
Qty: 30 TABLET | Refills: 0 | Status: SHIPPED | OUTPATIENT
Start: 2023-06-10 | End: 2023-11-21 | Stop reason: SDUPTHER

## 2023-06-14 ENCOUNTER — PATIENT MESSAGE (OUTPATIENT)
Dept: ADMINISTRATIVE | Facility: HOSPITAL | Age: 53
End: 2023-06-14
Payer: COMMERCIAL

## 2023-06-14 ENCOUNTER — DOCUMENTATION ONLY (OUTPATIENT)
Dept: ADMINISTRATIVE | Facility: HOSPITAL | Age: 53
End: 2023-06-14
Payer: COMMERCIAL

## 2023-09-07 ENCOUNTER — TELEPHONE (OUTPATIENT)
Dept: FAMILY MEDICINE | Facility: CLINIC | Age: 53
End: 2023-09-07

## 2023-09-07 ENCOUNTER — CLINICAL SUPPORT (OUTPATIENT)
Dept: FAMILY MEDICINE | Facility: CLINIC | Age: 53
End: 2023-09-07
Payer: COMMERCIAL

## 2023-09-07 ENCOUNTER — OFFICE VISIT (OUTPATIENT)
Dept: FAMILY MEDICINE | Facility: CLINIC | Age: 53
End: 2023-09-07
Payer: COMMERCIAL

## 2023-09-07 VITALS
HEART RATE: 81 BPM | DIASTOLIC BLOOD PRESSURE: 87 MMHG | HEIGHT: 63 IN | OXYGEN SATURATION: 99 % | RESPIRATION RATE: 18 BRPM | TEMPERATURE: 98 F | WEIGHT: 260 LBS | SYSTOLIC BLOOD PRESSURE: 132 MMHG | BODY MASS INDEX: 46.07 KG/M2

## 2023-09-07 DIAGNOSIS — E11.65 TYPE 2 DIABETES MELLITUS WITH HYPERGLYCEMIA, WITHOUT LONG-TERM CURRENT USE OF INSULIN: ICD-10-CM

## 2023-09-07 DIAGNOSIS — E11.65 TYPE 2 DIABETES MELLITUS WITH HYPERGLYCEMIA, WITHOUT LONG-TERM CURRENT USE OF INSULIN: Primary | ICD-10-CM

## 2023-09-07 DIAGNOSIS — Z23 ENCOUNTER FOR IMMUNIZATION: ICD-10-CM

## 2023-09-07 DIAGNOSIS — Z12.31 ENCOUNTER FOR SCREENING MAMMOGRAM FOR BREAST CANCER: ICD-10-CM

## 2023-09-07 DIAGNOSIS — Z91.89 CANDIDATE FOR STATIN THERAPY DUE TO RISK OF FUTURE CARDIOVASCULAR EVENT: ICD-10-CM

## 2023-09-07 LAB
EST. AVERAGE GLUCOSE BLD GHB EST-MCNC: 148.5 MG/DL
HBA1C MFR BLD: 6.8 %

## 2023-09-07 PROCEDURE — 3060F PR POS MICROALBUMINURIA RESULT DOCUMENTED/REVIEW: ICD-10-PCS | Mod: CPTII,,, | Performed by: NURSE PRACTITIONER

## 2023-09-07 PROCEDURE — 83036 HEMOGLOBIN GLYCOSYLATED A1C: CPT | Performed by: NURSE PRACTITIONER

## 2023-09-07 PROCEDURE — 99215 OFFICE O/P EST HI 40 MIN: CPT | Mod: PBBFAC | Performed by: NURSE PRACTITIONER

## 2023-09-07 PROCEDURE — 3066F PR DOCUMENTATION OF TREATMENT FOR NEPHROPATHY: ICD-10-PCS | Mod: CPTII,,, | Performed by: NURSE PRACTITIONER

## 2023-09-07 PROCEDURE — 3060F POS MICROALBUMINURIA REV: CPT | Mod: CPTII,,, | Performed by: NURSE PRACTITIONER

## 2023-09-07 PROCEDURE — 3044F HG A1C LEVEL LT 7.0%: CPT | Mod: CPTII,,, | Performed by: NURSE PRACTITIONER

## 2023-09-07 PROCEDURE — 1160F RVW MEDS BY RX/DR IN RCRD: CPT | Mod: CPTII,,, | Performed by: NURSE PRACTITIONER

## 2023-09-07 PROCEDURE — 3079F PR MOST RECENT DIASTOLIC BLOOD PRESSURE 80-89 MM HG: ICD-10-PCS | Mod: CPTII,,, | Performed by: NURSE PRACTITIONER

## 2023-09-07 PROCEDURE — 4010F ACE/ARB THERAPY RXD/TAKEN: CPT | Mod: CPTII,,, | Performed by: NURSE PRACTITIONER

## 2023-09-07 PROCEDURE — 36415 COLL VENOUS BLD VENIPUNCTURE: CPT | Performed by: NURSE PRACTITIONER

## 2023-09-07 PROCEDURE — 3079F DIAST BP 80-89 MM HG: CPT | Mod: CPTII,,, | Performed by: NURSE PRACTITIONER

## 2023-09-07 PROCEDURE — 90686 IIV4 VACC NO PRSV 0.5 ML IM: CPT | Mod: PBBFAC

## 2023-09-07 PROCEDURE — 3044F PR MOST RECENT HEMOGLOBIN A1C LEVEL <7.0%: ICD-10-PCS | Mod: CPTII,,, | Performed by: NURSE PRACTITIONER

## 2023-09-07 PROCEDURE — 99214 OFFICE O/P EST MOD 30 MIN: CPT | Mod: S$PBB,,, | Performed by: NURSE PRACTITIONER

## 2023-09-07 PROCEDURE — 3075F PR MOST RECENT SYSTOLIC BLOOD PRESS GE 130-139MM HG: ICD-10-PCS | Mod: CPTII,,, | Performed by: NURSE PRACTITIONER

## 2023-09-07 PROCEDURE — 3008F PR BODY MASS INDEX (BMI) DOCUMENTED: ICD-10-PCS | Mod: CPTII,,, | Performed by: NURSE PRACTITIONER

## 2023-09-07 PROCEDURE — 92228 IMG RTA DETC/MNTR DS PHY/QHP: CPT | Mod: PBBFAC

## 2023-09-07 PROCEDURE — 90471 IMMUNIZATION ADMIN: CPT | Mod: PBBFAC

## 2023-09-07 PROCEDURE — 1159F MED LIST DOCD IN RCRD: CPT | Mod: CPTII,,, | Performed by: NURSE PRACTITIONER

## 2023-09-07 PROCEDURE — 3066F NEPHROPATHY DOC TX: CPT | Mod: CPTII,,, | Performed by: NURSE PRACTITIONER

## 2023-09-07 PROCEDURE — 1159F PR MEDICATION LIST DOCUMENTED IN MEDICAL RECORD: ICD-10-PCS | Mod: CPTII,,, | Performed by: NURSE PRACTITIONER

## 2023-09-07 PROCEDURE — 3008F BODY MASS INDEX DOCD: CPT | Mod: CPTII,,, | Performed by: NURSE PRACTITIONER

## 2023-09-07 PROCEDURE — 4010F PR ACE/ARB THEARPY RXD/TAKEN: ICD-10-PCS | Mod: CPTII,,, | Performed by: NURSE PRACTITIONER

## 2023-09-07 PROCEDURE — 1160F PR REVIEW ALL MEDS BY PRESCRIBER/CLIN PHARMACIST DOCUMENTED: ICD-10-PCS | Mod: CPTII,,, | Performed by: NURSE PRACTITIONER

## 2023-09-07 PROCEDURE — 3075F SYST BP GE 130 - 139MM HG: CPT | Mod: CPTII,,, | Performed by: NURSE PRACTITIONER

## 2023-09-07 PROCEDURE — 99214 PR OFFICE/OUTPT VISIT, EST, LEVL IV, 30-39 MIN: ICD-10-PCS | Mod: S$PBB,,, | Performed by: NURSE PRACTITIONER

## 2023-09-07 RX ORDER — ROSUVASTATIN CALCIUM 10 MG/1
10 TABLET, COATED ORAL NIGHTLY
Qty: 90 TABLET | Refills: 3 | Status: SHIPPED | OUTPATIENT
Start: 2023-09-07 | End: 2023-11-21 | Stop reason: SDUPTHER

## 2023-09-07 RX ADMIN — INFLUENZA VIRUS VACCINE 0.5 ML: 15; 15; 15; 15 SUSPENSION INTRAMUSCULAR at 09:09

## 2023-09-07 NOTE — ASSESSMENT & PLAN NOTE
Last hemoglobin 1 AC was completed in June 2023 at 6.8% with estimated average blood glucose at 148.  Discussed with the patient the importance to keep blood glucose averaging less than 130 and to get her hemoglobin A1c less than 6.5%.  Continue metformin 500 mg p.o. b.i.d. daily, follow diabetic diet meal planning, stay hydrated with water, stay physically active at least exercise 30 minutes a day up to 5 days a week as simple as brisk walking, follow low-salt, low fat diet, keep fiber intake at least 30 g per day if possible.  Hemoglobin A1c pending.  Return to clinic in 6 months or sooner if needed.  Questions solicited and answered, patient verbalized, patient to read discharge education material.

## 2023-09-07 NOTE — PROGRESS NOTES
Patient Name: Courtney Easley   : 1970  MRN: [id number]     SUBJECTIVE DATA:    CHIEF COMPLAINT:   Courtney Easley is a 53 y.o. female who presents to clinic today with Diabetes and Shoulder Pain (Left)        HPI:  53-year-old female presents to the clinic to follow-up on diabetes also would like to discuss weight loss.    Diabetes type 2:  Last hemoglobin 1 AC was completed in 2023 at 6.8% with estimated average blood glucose at 148.  Discussed with the patient the importance to keep blood glucose averaging less than 130 and to get her hemoglobin A1c less than 6.5%.  Continue metformin 500 mg p.o. b.i.d. daily, follow diabetic diet meal planning, stay hydrated with water, stay physically active at least exercise 30 minutes a day up to 5 days a week as simple as brisk walking, follow low-salt, low fat diet, keep fiber intake at least 30 g per day if possible.  Hemoglobin A1c pending.  Return to clinic in 6 months or sooner if needed.  Questions solicited and answered, patient verbalized, patient to read discharge education material.  2023 addendum:  Current hemoglobin A1c 6.8%, continue with current treatment.      Weight loss:  BMI 46.  Discussed with patient simply to follow these basic instructions such as follow diabetic diet meal planning, stay hydrated with water, stay physically active at least exercise 30 minutes a day up to 5 days a week as simple as brisk walking, follow low-salt, low fat diet, keep fiber intake at least 30 g per day if possible.  Also monitor serving size, read nutritional labels, lifestyle modification.  Discussed with patient to follow low calorie diet start with 1800 calorie per day for 1 week and then decrease to 1600 calorie per day for 1 week and then decrease again to 1400 calorie per day for 1 week and then decrease again to 1200 calorie per day thereafter.  If 1200 calorie is not enough and you feel fatigue and tire may increase calorie to 1300  "calorie.  Adjust calorie as needed.  Get enough sleep.  Questions solicited and answered, patient verbalized and agreed to plan.    Initiating of low statin:  The 10-year ASCVD risk score (Alek SERRANO, et al., 2019) is: 15.9%    Values used to calculate the score:      Age: 53 years      Sex: Female      Is Non- : Yes      Diabetic: Yes      Tobacco smoker: No      Systolic Blood Pressure: 132 mmHg      Is BP treated: Yes      HDL Cholesterol: 34 mg/dL      Total Cholesterol: 157 mg/dL   Discussed Rx Crestor 10 mg p.o. at bedtime.  Follow low-fat, low-cholesterol diet.  Patient to notify the clinic if she is having issue with medication. Questions solicited and answered, patient verbalized.    Patient denies chest pain, shortness of breath, dyspnea on exertion, palpitations, peripheral edema, abdominal pain, nausea, vomiting, diarrhea, constipation, fatigue, fever, chills, dysuria,  hematuria, melena, or hematochezia.     Care gaps:  Mammogram referral re-initiated.  Instructed patient to get her voicemail on her phone established that where she does not miss the call.  To schedule for mammogram patient verbalized.    Patient agreed to receive flu vaccine.    Patient to read discharge education materials.    ALLERGIES: Review of patient's allergies indicates:  No Known Allergies      ROS:  Review of Systems   All other systems reviewed and are negative.        OBJECTIVE DATA:  Vital signs  Vitals:    09/07/23 0840   BP: 132/87   Pulse: 81   Resp: 18   Temp: 98 °F (36.7 °C)   TempSrc: Oral   SpO2: 99%   Weight: 117.9 kg (260 lb)   Height: 5' 3" (1.6 m)      Body mass index is 46.06 kg/m².    PHYSICAL EXAM:   Physical Exam  Vitals and nursing note reviewed.   Constitutional:       General: She is awake. She is not in acute distress.     Appearance: Normal appearance. She is well-developed and well-groomed. She is morbidly obese. She is not ill-appearing, toxic-appearing or diaphoretic.   HENT:    "   Head: Normocephalic and atraumatic.      Right Ear: Tympanic membrane, ear canal and external ear normal.      Left Ear: Tympanic membrane, ear canal and external ear normal.      Nose: Nose normal.      Mouth/Throat:      Mouth: Mucous membranes are moist.      Tongue: No lesions. Tongue does not deviate from midline.      Palate: No mass and lesions.      Pharynx: Oropharynx is clear. Uvula midline.   Eyes:      General: Lids are normal.      Extraocular Movements: Extraocular movements intact.      Conjunctiva/sclera: Conjunctivae normal.      Pupils: Pupils are equal, round, and reactive to light.   Neck:      Trachea: Trachea and phonation normal.   Cardiovascular:      Rate and Rhythm: Normal rate and regular rhythm.      Pulses: Normal pulses.           Radial pulses are 2+ on the right side and 2+ on the left side.      Heart sounds: Normal heart sounds. No murmur heard.  Pulmonary:      Effort: Pulmonary effort is normal.      Breath sounds: Normal breath sounds and air entry. No wheezing or rhonchi.   Abdominal:      Palpations: Abdomen is soft.      Tenderness: There is no right CVA tenderness or left CVA tenderness.   Musculoskeletal:         General: Normal range of motion.      Cervical back: Normal range of motion and neck supple. No rigidity or tenderness.      Right lower leg: No edema.      Left lower leg: No edema.   Lymphadenopathy:      Cervical: No cervical adenopathy.   Skin:     General: Skin is warm and dry.      Capillary Refill: Capillary refill takes less than 2 seconds.   Neurological:      General: No focal deficit present.      Mental Status: She is alert and oriented to person, place, and time. Mental status is at baseline.      GCS: GCS eye subscore is 4. GCS verbal subscore is 5. GCS motor subscore is 6.      Cranial Nerves: Cranial nerves 2-12 are intact.      Sensory: Sensation is intact.      Motor: Motor function is intact.      Coordination: Coordination is intact.      Gait:  Gait is intact.   Psychiatric:         Attention and Perception: Attention and perception normal.         Mood and Affect: Mood and affect normal.         Speech: Speech normal.         Behavior: Behavior normal. Behavior is cooperative.         Thought Content: Thought content normal.         Cognition and Memory: Cognition and memory normal.         Judgment: Judgment normal.          ASSESSMENT/PLAN:  1. Type 2 diabetes mellitus with hyperglycemia, without long-term current use of insulin  Assessment & Plan:  Last hemoglobin 1 AC was completed in June 2023 at 6.8% with estimated average blood glucose at 148.  Discussed with the patient the importance to keep blood glucose averaging less than 130 and to get her hemoglobin A1c less than 6.5%.  Continue metformin 500 mg p.o. b.i.d. daily, follow diabetic diet meal planning, stay hydrated with water, stay physically active at least exercise 30 minutes a day up to 5 days a week as simple as brisk walking, follow low-salt, low fat diet, keep fiber intake at least 30 g per day if possible.  Hemoglobin A1c pending.  Return to clinic in 6 months or sooner if needed.  Questions solicited and answered, patient verbalized, patient to read discharge education material.    Orders:  -     Hemoglobin A1C  -     Diabetic Eye Screening Photo; Future; Expected date: 09/07/2023  -     rosuvastatin (CRESTOR) 10 MG tablet; Take 1 tablet (10 mg total) by mouth every evening.  Dispense: 90 tablet; Refill: 3    2. Candidate for statin therapy due to risk of future cardiovascular event  Assessment & Plan:  The 10-year ASCVD risk score (Alek SERRANO, et al., 2019) is: 15.9%    Values used to calculate the score:      Age: 53 years      Sex: Female      Is Non- : Yes      Diabetic: Yes      Tobacco smoker: No      Systolic Blood Pressure: 132 mmHg      Is BP treated: Yes      HDL Cholesterol: 34 mg/dL      Total Cholesterol: 157 mg/dL   Discussed Rx Crestor 10 mg p.o.  at bedtime.  Follow low-fat, low-cholesterol diet.  Patient to notify the clinic if she is having issue with medication. Questions solicited and answered, patient verbalized.        3. BMI 45.0-49.9, adult  Assessment & Plan:  BMI 46.  Discussed with patient simply to follow these basic instructions such as follow diabetic diet meal planning, stay hydrated with water, stay physically active at least exercise 30 minutes a day up to 5 days a week as simple as brisk walking, follow low-salt, low fat diet, keep fiber intake at least 30 g per day if possible.  Also monitor serving size, read nutritional labels, lifestyle modification.  Discussed with patient to follow low calorie diet start with 1800 calorie per day for 1 week and then decrease to 1600 calorie per day for 1 week and then decrease again to 1400 calorie per day for 1 week and then decrease again to 1200 calorie per day thereafter.  If 1200 calorie is not enough and you feel fatigue and tire may increase calorie to 1300 calorie.  Adjust calorie as needed.  Get enough sleep.  Questions solicited and answered, patient verbalized and agreed to plan.      4. Encounter for immunization  -     Cancel: Influenza - Quadrivalent (PF)  -     influenza (QUADRIVALENT PF) vaccine 0.5 mL    5. Encounter for screening mammogram for breast cancer  -     Mammo Digital Screening Bilat w/ Felipe; Future; Expected date: 09/07/2023           RESULTS:  No results found for this or any previous visit (from the past 1008 hour(s)).      Follow Up:  Follow up in about 6 months (around 3/7/2024).     Face to face time 30 minutes, including documentation, chart review, counseling, education, review of test results, relevant medical records, and coordination of care.   I have explained the treatment plan, diagnosis, and prognosis to patient. All questions are answered to the best of my knowledge.     Previous medical history/lab work/radiology reviewed and considered during medical  management decisions.   Medication list reviewed and medication reconciliation performed.  Patient was provided  and care about his/her current diagnosis (es) and medications including risk/benefit and side effects/adverse events, over the counter medication uses/doses, home self-care and contact precautions,  and red flags and indications for when to seek immediate medical attention.   Patient was advised to continue compliance with current medication list and medical recommendations.  Patient dvised continued compliance with recommended eating habits/ diets for medical conditions and exercise 150 minutes/ week (if possible) for medical condition (s).  Educational handouts and instructions on selected disease management in AVS (After Visit Summary).    All of the patient's questions were answered to patient's satisfaction.   The patient was receptive, expressed verbal understanding and agreement the above plan.       This note was created with the assistance of a voice recognition software or phone dictation. There may be transcription errors as a result of using this technology however minimal. Effort has been made to assure accuracy of transcription but any obvious errors or omissions should be clarified with the author of the document

## 2023-09-07 NOTE — ASSESSMENT & PLAN NOTE
The 10-year ASCVD risk score (Alek SERRANO, et al., 2019) is: 15.9%    Values used to calculate the score:      Age: 53 years      Sex: Female      Is Non- : Yes      Diabetic: Yes      Tobacco smoker: No      Systolic Blood Pressure: 132 mmHg      Is BP treated: Yes      HDL Cholesterol: 34 mg/dL      Total Cholesterol: 157 mg/dL   Discussed Rx Crestor 10 mg p.o. at bedtime.  Follow low-fat, low-cholesterol diet.  Patient to notify the clinic if she is having issue with medication. Questions solicited and answered, patient verbalized.

## 2023-09-07 NOTE — TELEPHONE ENCOUNTER
----- Message from QUINN Kilpatrick sent at 9/7/2023  2:40 PM CDT -----  Current hemoglobin A1c 6.8%, continue with current treatment.

## 2023-09-07 NOTE — ASSESSMENT & PLAN NOTE
BMI 46.  Discussed with patient simply to follow these basic instructions such as follow diabetic diet meal planning, stay hydrated with water, stay physically active at least exercise 30 minutes a day up to 5 days a week as simple as brisk walking, follow low-salt, low fat diet, keep fiber intake at least 30 g per day if possible.  Also monitor serving size, read nutritional labels, lifestyle modification.  Discussed with patient to follow low calorie diet start with 1800 calorie per day for 1 week and then decrease to 1600 calorie per day for 1 week and then decrease again to 1400 calorie per day for 1 week and then decrease again to 1200 calorie per day thereafter.  If 1200 calorie is not enough and you feel fatigue and tire may increase calorie to 1300 calorie.  Adjust calorie as needed.  Get enough sleep.  Questions solicited and answered, patient verbalized and agreed to plan.

## 2023-09-07 NOTE — PROGRESS NOTES
Courtney Easley is a 53 y.o. female here for a diabetic eye screening with non-dilated fundus photos per Norm Ardon .    Patient cooperative?: Yes  Small pupils?: No  Last eye exam: Unknown    For exam results, see Encounter Report.

## 2023-09-11 ENCOUNTER — TELEPHONE (OUTPATIENT)
Dept: FAMILY MEDICINE | Facility: CLINIC | Age: 53
End: 2023-09-11
Payer: COMMERCIAL

## 2023-09-11 PROBLEM — Z13.220 ENCOUNTER FOR LIPID SCREENING FOR CARDIOVASCULAR DISEASE: Status: RESOLVED | Noted: 2023-06-06 | Resolved: 2023-09-11

## 2023-09-11 PROBLEM — Z13.6 ENCOUNTER FOR LIPID SCREENING FOR CARDIOVASCULAR DISEASE: Status: RESOLVED | Noted: 2023-06-06 | Resolved: 2023-09-11

## 2023-09-11 NOTE — TELEPHONE ENCOUNTER
----- Message from QUINN Kilpatrick sent at 9/11/2023  9:32 AM CDT -----  No diabetic retinopathy.  Follow-up photography in 12 months.

## 2023-09-19 ENCOUNTER — HOSPITAL ENCOUNTER (OUTPATIENT)
Dept: RADIOLOGY | Facility: HOSPITAL | Age: 53
Discharge: HOME | End: 2023-09-19
Attending: NURSE PRACTITIONER
Payer: COMMERCIAL

## 2023-09-19 DIAGNOSIS — Z12.31 ENCOUNTER FOR SCREENING MAMMOGRAM FOR BREAST CANCER: ICD-10-CM

## 2023-09-19 PROCEDURE — 77067 SCR MAMMO BI INCL CAD: CPT | Mod: TC

## 2023-09-19 PROCEDURE — 77067 MAMMO DIGITAL SCREENING BILAT WITH TOMO: ICD-10-PCS | Mod: 26,,, | Performed by: RADIOLOGY

## 2023-09-19 PROCEDURE — 77067 SCR MAMMO BI INCL CAD: CPT | Mod: 26,,, | Performed by: RADIOLOGY

## 2023-09-19 PROCEDURE — 77063 MAMMO DIGITAL SCREENING BILAT WITH TOMO: ICD-10-PCS | Mod: 26,,, | Performed by: RADIOLOGY

## 2023-09-19 PROCEDURE — 77063 BREAST TOMOSYNTHESIS BI: CPT | Mod: 26,,, | Performed by: RADIOLOGY

## 2023-09-20 ENCOUNTER — TELEPHONE (OUTPATIENT)
Dept: FAMILY MEDICINE | Facility: CLINIC | Age: 53
End: 2023-09-20
Payer: COMMERCIAL

## 2023-09-20 NOTE — PROGRESS NOTES
Please notify patient regarding her mammogram test results.  FINDINGS:   No suspicious mass, asymmetry, distortion, or calcification is identified.         IMPRESSION: NEGATIVE  Right Breast: Negative (BI-RADS 1)  Left Breast: Negative (BI-RADS 1)     Recommendations:  Recommend continued annual screening mammography, according to [employer] guidelines.

## 2023-09-20 NOTE — TELEPHONE ENCOUNTER
----- Message from QUINN Kilpatrick sent at 9/20/2023 10:55 AM CDT -----  Please notify patient regarding her mammogram test results.  FINDINGS:   No suspicious mass, asymmetry, distortion, or calcification is identified.         IMPRESSION: NEGATIVE  Right Breast: Negative (BI-RADS 1)  Left Breast: Negative (BI-RADS 1)     Recommendations:  Recommend continued annual screening mammography, according to [employer] guidelines.

## 2023-11-16 ENCOUNTER — LAB VISIT (OUTPATIENT)
Dept: LAB | Facility: HOSPITAL | Age: 53
End: 2023-11-16
Attending: NURSE PRACTITIONER
Payer: COMMERCIAL

## 2023-11-16 DIAGNOSIS — N18.32 CKD STAGE G3B/A3, GFR 30-44 AND ALBUMIN CREATININE RATIO >300 MG/G: ICD-10-CM

## 2023-11-16 LAB
ALBUMIN SERPL-MCNC: 3.6 G/DL (ref 3.5–5)
ALBUMIN/GLOB SERPL: 1 RATIO (ref 1.1–2)
ALP SERPL-CCNC: 73 UNIT/L (ref 40–150)
ALT SERPL-CCNC: 17 UNIT/L (ref 0–55)
APPEARANCE UR: CLEAR
AST SERPL-CCNC: 19 UNIT/L (ref 5–34)
BACTERIA #/AREA URNS AUTO: ABNORMAL /HPF
BILIRUB SERPL-MCNC: 0.6 MG/DL
BILIRUB UR QL STRIP.AUTO: NEGATIVE
BUN SERPL-MCNC: 22.1 MG/DL (ref 9.8–20.1)
CALCIUM SERPL-MCNC: 10.3 MG/DL (ref 8.4–10.2)
CHLORIDE SERPL-SCNC: 106 MMOL/L (ref 98–107)
CO2 SERPL-SCNC: 26 MMOL/L (ref 22–29)
COLOR UR AUTO: ABNORMAL
CREAT SERPL-MCNC: 1.84 MG/DL (ref 0.55–1.02)
CREAT UR-MCNC: 77.7 MG/DL (ref 45–106)
GFR SERPLBLD CREATININE-BSD FMLA CKD-EPI: 32 MLS/MIN/1.73/M2
GLOBULIN SER-MCNC: 3.6 GM/DL (ref 2.4–3.5)
GLUCOSE SERPL-MCNC: 122 MG/DL (ref 74–100)
GLUCOSE UR QL STRIP.AUTO: NORMAL
HYALINE CASTS #/AREA URNS LPF: ABNORMAL /LPF
KETONES UR QL STRIP.AUTO: NEGATIVE
LEUKOCYTE ESTERASE UR QL STRIP.AUTO: NEGATIVE
NITRITE UR QL STRIP.AUTO: NEGATIVE
PH UR STRIP.AUTO: 6 [PH]
POTASSIUM SERPL-SCNC: 4.1 MMOL/L (ref 3.5–5.1)
PROT SERPL-MCNC: 7.2 GM/DL (ref 6.4–8.3)
PROT UR QL STRIP.AUTO: ABNORMAL
PROT UR STRIP-MCNC: 38.3 MG/DL
RBC #/AREA URNS AUTO: ABNORMAL /HPF
RBC UR QL AUTO: NEGATIVE
SODIUM SERPL-SCNC: 140 MMOL/L (ref 136–145)
SP GR UR STRIP.AUTO: 1.01 (ref 1–1.03)
SQUAMOUS #/AREA URNS LPF: ABNORMAL /HPF
URINE PROTEIN/CREATININE RATIO (OHS): 0.5
UROBILINOGEN UR STRIP-ACNC: NORMAL
WBC #/AREA URNS AUTO: ABNORMAL /HPF

## 2023-11-16 PROCEDURE — 82570 ASSAY OF URINE CREATININE: CPT

## 2023-11-16 PROCEDURE — 80053 COMPREHEN METABOLIC PANEL: CPT

## 2023-11-16 PROCEDURE — 36415 COLL VENOUS BLD VENIPUNCTURE: CPT

## 2023-11-16 PROCEDURE — 81001 URINALYSIS AUTO W/SCOPE: CPT

## 2023-11-21 ENCOUNTER — OFFICE VISIT (OUTPATIENT)
Dept: NEPHROLOGY | Facility: CLINIC | Age: 53
End: 2023-11-21
Payer: COMMERCIAL

## 2023-11-21 VITALS
RESPIRATION RATE: 18 BRPM | WEIGHT: 258.19 LBS | SYSTOLIC BLOOD PRESSURE: 120 MMHG | BODY MASS INDEX: 45.75 KG/M2 | TEMPERATURE: 98 F | DIASTOLIC BLOOD PRESSURE: 81 MMHG | OXYGEN SATURATION: 98 % | HEART RATE: 77 BPM | HEIGHT: 63 IN

## 2023-11-21 DIAGNOSIS — G89.29 CHRONIC LOW BACK PAIN WITHOUT SCIATICA, UNSPECIFIED BACK PAIN LATERALITY: ICD-10-CM

## 2023-11-21 DIAGNOSIS — M54.50 CHRONIC LOW BACK PAIN WITHOUT SCIATICA, UNSPECIFIED BACK PAIN LATERALITY: ICD-10-CM

## 2023-11-21 DIAGNOSIS — E11.65 TYPE 2 DIABETES MELLITUS WITH HYPERGLYCEMIA, WITHOUT LONG-TERM CURRENT USE OF INSULIN: ICD-10-CM

## 2023-11-21 DIAGNOSIS — N18.32 CKD STAGE G3B/A3, GFR 30-44 AND ALBUMIN CREATININE RATIO >300 MG/G: Primary | ICD-10-CM

## 2023-11-21 DIAGNOSIS — I10 PRIMARY HYPERTENSION: ICD-10-CM

## 2023-11-21 PROCEDURE — 3066F NEPHROPATHY DOC TX: CPT | Mod: CPTII,,, | Performed by: NURSE PRACTITIONER

## 2023-11-21 PROCEDURE — 3044F PR MOST RECENT HEMOGLOBIN A1C LEVEL <7.0%: ICD-10-PCS | Mod: CPTII,,, | Performed by: NURSE PRACTITIONER

## 2023-11-21 PROCEDURE — 3060F POS MICROALBUMINURIA REV: CPT | Mod: CPTII,,, | Performed by: NURSE PRACTITIONER

## 2023-11-21 PROCEDURE — 3079F PR MOST RECENT DIASTOLIC BLOOD PRESSURE 80-89 MM HG: ICD-10-PCS | Mod: CPTII,,, | Performed by: NURSE PRACTITIONER

## 2023-11-21 PROCEDURE — 3008F PR BODY MASS INDEX (BMI) DOCUMENTED: ICD-10-PCS | Mod: CPTII,,, | Performed by: NURSE PRACTITIONER

## 2023-11-21 PROCEDURE — 1159F PR MEDICATION LIST DOCUMENTED IN MEDICAL RECORD: ICD-10-PCS | Mod: CPTII,,, | Performed by: NURSE PRACTITIONER

## 2023-11-21 PROCEDURE — 3008F BODY MASS INDEX DOCD: CPT | Mod: CPTII,,, | Performed by: NURSE PRACTITIONER

## 2023-11-21 PROCEDURE — 3074F SYST BP LT 130 MM HG: CPT | Mod: CPTII,,, | Performed by: NURSE PRACTITIONER

## 2023-11-21 PROCEDURE — 99214 PR OFFICE/OUTPT VISIT, EST, LEVL IV, 30-39 MIN: ICD-10-PCS | Mod: S$PBB,,, | Performed by: NURSE PRACTITIONER

## 2023-11-21 PROCEDURE — 3044F HG A1C LEVEL LT 7.0%: CPT | Mod: CPTII,,, | Performed by: NURSE PRACTITIONER

## 2023-11-21 PROCEDURE — 3074F PR MOST RECENT SYSTOLIC BLOOD PRESSURE < 130 MM HG: ICD-10-PCS | Mod: CPTII,,, | Performed by: NURSE PRACTITIONER

## 2023-11-21 PROCEDURE — 3079F DIAST BP 80-89 MM HG: CPT | Mod: CPTII,,, | Performed by: NURSE PRACTITIONER

## 2023-11-21 PROCEDURE — 3060F PR POS MICROALBUMINURIA RESULT DOCUMENTED/REVIEW: ICD-10-PCS | Mod: CPTII,,, | Performed by: NURSE PRACTITIONER

## 2023-11-21 PROCEDURE — 1159F MED LIST DOCD IN RCRD: CPT | Mod: CPTII,,, | Performed by: NURSE PRACTITIONER

## 2023-11-21 PROCEDURE — 4010F ACE/ARB THERAPY RXD/TAKEN: CPT | Mod: CPTII,,, | Performed by: NURSE PRACTITIONER

## 2023-11-21 PROCEDURE — 4010F PR ACE/ARB THEARPY RXD/TAKEN: ICD-10-PCS | Mod: CPTII,,, | Performed by: NURSE PRACTITIONER

## 2023-11-21 PROCEDURE — 99215 OFFICE O/P EST HI 40 MIN: CPT | Mod: PBBFAC | Performed by: NURSE PRACTITIONER

## 2023-11-21 PROCEDURE — 99214 OFFICE O/P EST MOD 30 MIN: CPT | Mod: S$PBB,,, | Performed by: NURSE PRACTITIONER

## 2023-11-21 PROCEDURE — 3066F PR DOCUMENTATION OF TREATMENT FOR NEPHROPATHY: ICD-10-PCS | Mod: CPTII,,, | Performed by: NURSE PRACTITIONER

## 2023-11-21 RX ORDER — CHOLECALCIFEROL (VITAMIN D3) 25 MCG
1000 TABLET ORAL DAILY
COMMUNITY

## 2023-11-21 RX ORDER — CYCLOBENZAPRINE HCL 5 MG
5 TABLET ORAL NIGHTLY
Qty: 15 TABLET | Refills: 0 | Status: SHIPPED | OUTPATIENT
Start: 2023-11-21 | End: 2023-12-06

## 2023-11-21 RX ORDER — CALCIUM CARBONATE 600 MG
600 TABLET ORAL DAILY
COMMUNITY

## 2023-11-21 RX ORDER — CYCLOBENZAPRINE HCL 5 MG
5 TABLET ORAL NIGHTLY
Qty: 15 TABLET | Refills: 0 | Status: CANCELLED | OUTPATIENT
Start: 2023-11-21 | End: 2023-12-06

## 2023-11-21 RX ORDER — ROSUVASTATIN CALCIUM 10 MG/1
10 TABLET, COATED ORAL NIGHTLY
Qty: 90 TABLET | Refills: 3 | Status: SHIPPED | OUTPATIENT
Start: 2023-11-21 | End: 2024-11-20

## 2023-11-21 RX ORDER — METFORMIN HYDROCHLORIDE 500 MG/1
500 TABLET ORAL 2 TIMES DAILY WITH MEALS
Qty: 180 TABLET | Refills: 3 | Status: SHIPPED | OUTPATIENT
Start: 2023-11-21 | End: 2024-11-20

## 2023-11-21 NOTE — PROGRESS NOTES
"[facility]  Nephrology Clinic Note    Chief complaint: Chronic Kidney Disease (RTC, took meds, edema catarina le, c/o back muscles spams)    History of present illness:   Courtney Easley is a 53 y.o. Black or  female with past medical history of  CKD, hypertension, sickle cell trait, anemia, left ventricular hypertrophy, IGT, hepatitis B, and morbid obesity. Patient presents for follow-up appointment in nephrology clinic today. C/o muscle spasms, requests refills of muscle relaxants.     Review of Systems  12 point review of systems conducted, negative except as stated in the history of present illness.    Allergies: Patient has No Known Allergies.     Past Medical History:  has a past medical history of Anemia, unspecified, CKD (chronic kidney disease), HTN (hypertension), IGT (impaired glucose tolerance), Left ventricular hypertrophy, Morbid obesity, Personal history of colonic polyps, Sickle cell trait, and Type 2 diabetes mellitus with unspecified diabetic retinopathy without macular edema.    Procedure History:  has a past surgical history that includes Cystoscopy (07/19/2018); Cystourethroscopy (07/19/2018); Hysterectomy (07/19/2018); Salpingectomy (Bilateral, 07/19/2018); Tubal ligation; and Colonoscopy (04/26/2023).    Family History: family history includes Aneurysm in her sister; Cancer in her mother; Diabetes in her sister; Heart disease in her brother; Hypertension in her brother and father.    Social History:  reports that she has never smoked. She has never used smokeless tobacco. She reports current alcohol use of about 2.0 standard drinks of alcohol per week. She reports that she does not use drugs.    Physical exam  /81 (BP Location: Right arm, Patient Position: Sitting, BP Method: Large (Automatic))   Pulse 77   Temp 98 °F (36.7 °C) (Oral)   Resp 18   Ht 5' 2.99" (1.6 m)   Wt 117.1 kg (258 lb 3.2 oz)   SpO2 98%   BMI 45.75 kg/m²   General " appearance: Patient is in no acute distress.  Skin: No rashes or wounds.  HEENT: PERRLA, EOMI, no scleral icterus, no JVD. Neck is supple.  Chest: Respirations are unlabored. Lungs sounds are clear.   Heart: S1, S2.   Abdomen: Benign.  : Deferred.  Extremities: No edema, peripheral pulses are palpable.   Neuro: No focal deficits.     Home Medications:    Current Outpatient Medications:     allopurinoL (ZYLOPRIM) 100 MG tablet, Take 1 tablet (100 mg total) by mouth once daily., Disp: 90 tablet, Rfl: 3    amLODIPine (NORVASC) 10 MG tablet, Take 1 tablet (10 mg total) by mouth once daily., Disp: 90 tablet, Rfl: 3    aspirin (ECOTRIN) 81 MG EC tablet, TAKE 1 TABLET BY MOUTH EVERY DAY, Disp: 90 tablet, Rfl: 3    calcium carbonate (OS-TO) 600 mg calcium (1,500 mg) Tab, Take 600 mg by mouth once daily., Disp: , Rfl:     dapagliflozin (FARXIGA) 10 mg tablet, Take 1 tablet (10 mg total) by mouth once daily., Disp: 90 tablet, Rfl: 3    docusate sodium (COLACE) 50 MG capsule, Take 50 mg by mouth once daily., Disp: , Rfl:     hydrALAZINE (APRESOLINE) 25 MG tablet, Take 1 tablet (25 mg total) by mouth 2 (two) times a day., Disp: 180 tablet, Rfl: 3    hydroCHLOROthiazide (HYDRODIURIL) 25 MG tablet, Take 1 tablet (25 mg total) by mouth once daily., Disp: 90 tablet, Rfl: 3    irbesartan (AVAPRO) 150 MG tablet, Take 1 tablet (150 mg total) by mouth every evening., Disp: 90 tablet, Rfl: 3    levocetirizine (XYZAL) 5 MG tablet, Take 1 tablet (5 mg total) by mouth every evening., Disp: 90 tablet, Rfl: 3    metFORMIN (GLUCOPHAGE) 500 MG tablet, Take 1 tablet (500 mg total) by mouth 2 (two) times daily with meals., Disp: 180 tablet, Rfl: 3    metoprolol tartrate (LOPRESSOR) 25 MG tablet, Take 1 tablet (25 mg total) by mouth 2 (two) times a day., Disp: 180 tablet, Rfl: 3    vitamin D (VITAMIN D3) 1000 units Tab, Take 1,000 Units by mouth once daily., Disp: , Rfl:     cyclobenzaprine (FLEXERIL) 5 MG tablet, Take 1 tablet (5 mg total) by  mouth every evening. for 15 days, Disp: 15 tablet, Rfl: 0    rosuvastatin (CRESTOR) 10 MG tablet, Take 1 tablet (10 mg total) by mouth every evening., Disp: 90 tablet, Rfl: 3    Laboratory data    Serum  Lab Results   Component Value Date    WBC 7.2 11/04/2021    HGB 13.7 11/04/2021    HCT 41.4 11/04/2021     11/04/2021     11/16/2023    K 4.1 11/16/2023    CHLORIDE 106 11/16/2023    CO2 26 11/16/2023    BUN 22.1 (H) 11/16/2023    CREATININE 1.84 (H) 11/16/2023    EGFRNORACEVR 32 11/16/2023    GLUCOSE 122 (H) 11/16/2023    CALCIUM 10.3 (H) 11/16/2023    ALKPHOS 73 11/16/2023    LABPROT 7.2 11/16/2023    ALBUMIN 3.6 11/16/2023    BILIDIR 0.2 02/04/2022    IBILI 0.40 02/04/2022    AST 19 11/16/2023    ALT 17 11/16/2023      Lab Results   Component Value Date    HGBA1C 6.8 09/07/2023    HIV Nonreactive 02/19/2021    HEPCAB Nonreactive 02/19/2021    HEPBSURFAG Reactive (A) 11/04/2021    HEPBSAB Nonreactive 11/04/2021    HEPBCAB Reactive (A) 02/19/2021    MSPIKEPCT 3.51 06/08/2021     Urine:  Lab Results   Component Value Date    COLORUA Light-Yellow 11/16/2023    APPEARANCEUA Clear 11/16/2023    SGUA 1.011 11/16/2023    PHUA 6.0 11/16/2023    PROTEINUA 1+ (A) 11/16/2023    GLUCOSEUA Normal 11/16/2023    KETONESUA Negative 11/16/2023    BLOODUA Negative 11/16/2023    NITRITESUA Negative 11/16/2023    LEUKOCYTESUR Negative 11/16/2023    RBCUA 0-5 11/16/2023    WBCUA 0-5 11/16/2023    BACTERIA None Seen 11/16/2023    SQEPUA Trace (A) 11/16/2023    HYALINECASTS None Seen 11/16/2023    CREATRANDUR 77.7 11/16/2023    PROTEINURINE 38.3 11/16/2023    UPROTCREA 0.5 11/16/2023         Imaging  US Retroperitoneal Limited 11/30/2020  The right kidney measures 9.8 x 5.4 x 3.9 cm and is unremarkable.  There is no right hydronephrosis.  The left kidney measures 9.9 x 5.1 x 5.0 cm and demonstrates a 9 mm  simple cyst. There is no left hydronephrosis. The left kidney is  otherwise unremarkable.  IMPRESSION:  1. 9 mm simple  left renal cyst.  2. Otherwise unremarkable exam.  Electronically Signed By: Kali Juárez MD  Date/Time Signed: 11/30/2020 12:13      Impression    ICD-10-CM ICD-9-CM   1. CKD stage G3b/A3, GFR 30-44 and albumin creatinine ratio >300 mg/g  N18.32 585.3   2. Chronic low back pain without sciatica, unspecified back pain laterality  M54.50 724.2    G89.29 338.29   3. Primary hypertension  I10 401.9   4. BMI 45.0-49.9, adult  Z68.42 V85.42        Plan     CKD stage G3b/A3, GFR 30-44 and albumin creatinine ratio >300 mg/g  -     CBC Auto Differential; Future; Expected date: 07/10/2024  -     Comprehensive Metabolic Panel; Future; Expected date: 07/10/2024  -     Phosphorus; Future; Expected date: 07/10/2024  -     PTH, Intact; Future; Expected date: 07/10/2024  -     Protein/Creatinine Ratio, Urine; Future; Expected date: 07/10/2024  -     Urinalysis, Reflex to Urine Culture; Future; Expected date: 07/10/2024  -     Uric Acid; Future; Expected date: 07/10/2024  Renal indices are stable. Proteinuria is in the nephrotic range. Proteinuria work-up revealed negative SIMRAN, negative ANCA, negative HIV, negative hepatitis C antibody, positive hepatitis B surface antigen, and SPEP positive for M spike. Imaging of the kidneys was unremarkable.   Skeletal survey on 11/23/2021 revealed no suspicious bony lesions. Patient has established care in ID clinic for management of chronic inactive hepatitis B, currently not on treatment.  Patient has been evaluated by hematology clinic for MGUS, no treatment was recommended.    MGRS is less likely due to stable serum creatinine levels and urinary protein excretion.  Continue risk factor management, MAKENZIE blockade, and periodic monitoring.    Follow up in about 8 months (around 7/21/2024).    Chronic low back pain without sciatica, unspecified back pain laterality  -     cyclobenzaprine (FLEXERIL) 5 MG tablet; Take 1 tablet (5 mg total) by mouth every evening. for 15 days  Dispense: 15  tablet; Refill: 0  Continue Flexeril as needed.  Consider gentle stretching exercises/warm compresses.      Primary hypertension  Blood pressure reading is at goal, continue current antihypertensive regimen and 2 g a day dietary sodium restriction.      BMI 45.0-49.9, adult  Lifestyle and dietary interventions discussed, patient encouraged to maintain non-sedentary lifestyle and well-balanced diet.           11/21/2023  Ketty Dumas NP  [facility]

## 2023-11-28 DIAGNOSIS — I10 PRIMARY HYPERTENSION: ICD-10-CM

## 2023-11-28 RX ORDER — HYDROCHLOROTHIAZIDE 25 MG/1
25 TABLET ORAL
Qty: 90 TABLET | Refills: 3 | Status: SHIPPED | OUTPATIENT
Start: 2023-11-28

## 2023-11-28 RX ORDER — METOPROLOL TARTRATE 25 MG/1
25 TABLET, FILM COATED ORAL 2 TIMES DAILY
Qty: 180 TABLET | Refills: 3 | Status: SHIPPED | OUTPATIENT
Start: 2023-11-28

## 2024-02-24 DIAGNOSIS — I10 PRIMARY HYPERTENSION: ICD-10-CM

## 2024-02-24 RX ORDER — AMLODIPINE BESYLATE 10 MG/1
10 TABLET ORAL
Qty: 90 TABLET | Refills: 3 | Status: SHIPPED | OUTPATIENT
Start: 2024-02-24

## 2024-03-08 ENCOUNTER — OFFICE VISIT (OUTPATIENT)
Dept: FAMILY MEDICINE | Facility: CLINIC | Age: 54
End: 2024-03-08
Payer: COMMERCIAL

## 2024-03-08 ENCOUNTER — TELEPHONE (OUTPATIENT)
Dept: FAMILY MEDICINE | Facility: CLINIC | Age: 54
End: 2024-03-08

## 2024-03-08 VITALS
SYSTOLIC BLOOD PRESSURE: 136 MMHG | RESPIRATION RATE: 18 BRPM | OXYGEN SATURATION: 98 % | WEIGHT: 264 LBS | TEMPERATURE: 98 F | HEART RATE: 91 BPM | BODY MASS INDEX: 48.58 KG/M2 | HEIGHT: 62 IN | DIASTOLIC BLOOD PRESSURE: 86 MMHG

## 2024-03-08 DIAGNOSIS — M1A.3520: ICD-10-CM

## 2024-03-08 DIAGNOSIS — E11.65 TYPE 2 DIABETES MELLITUS WITH HYPERGLYCEMIA, WITHOUT LONG-TERM CURRENT USE OF INSULIN: Primary | ICD-10-CM

## 2024-03-08 LAB
EST. AVERAGE GLUCOSE BLD GHB EST-MCNC: 134.1 MG/DL
HBA1C MFR BLD: 6.3 %
URATE SERPL-MCNC: 7.2 MG/DL (ref 2.6–6)

## 2024-03-08 PROCEDURE — 3075F SYST BP GE 130 - 139MM HG: CPT | Mod: CPTII,,, | Performed by: NURSE PRACTITIONER

## 2024-03-08 PROCEDURE — 83036 HEMOGLOBIN GLYCOSYLATED A1C: CPT | Performed by: NURSE PRACTITIONER

## 2024-03-08 PROCEDURE — 3044F HG A1C LEVEL LT 7.0%: CPT | Mod: CPTII,,, | Performed by: NURSE PRACTITIONER

## 2024-03-08 PROCEDURE — 1159F MED LIST DOCD IN RCRD: CPT | Mod: CPTII,,, | Performed by: NURSE PRACTITIONER

## 2024-03-08 PROCEDURE — 3079F DIAST BP 80-89 MM HG: CPT | Mod: CPTII,,, | Performed by: NURSE PRACTITIONER

## 2024-03-08 PROCEDURE — 84550 ASSAY OF BLOOD/URIC ACID: CPT | Performed by: NURSE PRACTITIONER

## 2024-03-08 PROCEDURE — 1160F RVW MEDS BY RX/DR IN RCRD: CPT | Mod: CPTII,,, | Performed by: NURSE PRACTITIONER

## 2024-03-08 PROCEDURE — 99213 OFFICE O/P EST LOW 20 MIN: CPT | Mod: S$PBB,,, | Performed by: NURSE PRACTITIONER

## 2024-03-08 PROCEDURE — 3008F BODY MASS INDEX DOCD: CPT | Mod: CPTII,,, | Performed by: NURSE PRACTITIONER

## 2024-03-08 PROCEDURE — 99215 OFFICE O/P EST HI 40 MIN: CPT | Mod: PBBFAC | Performed by: NURSE PRACTITIONER

## 2024-03-08 PROCEDURE — 36415 COLL VENOUS BLD VENIPUNCTURE: CPT | Performed by: NURSE PRACTITIONER

## 2024-03-08 RX ORDER — DAPAGLIFLOZIN 10 MG/1
10 TABLET, FILM COATED ORAL DAILY
Qty: 90 TABLET | Refills: 3 | Status: CANCELLED | OUTPATIENT
Start: 2024-03-08

## 2024-03-08 NOTE — ASSESSMENT & PLAN NOTE
Patient state she is currently taking cyclobenzaprine 5 mg p.o. as needed for spasm.  Patient requesting another medication for during the day.  Discussed with patient all muscle relaxers causes dizziness and sleepiness and I do not recommend to be taken during the day during activity and work or driving.  Continue cyclobenzaprine 5 mg at bedtime as needed for muscle spasm, continue Tylenol Arthritis 650 p.o. every 8 hours as needed for pain, stay hydrated with water, lose weight, stay physically active at least 30 minutes a day up to 5 days a week as simple as brisk walking.  Repeat uric acid to rule out gout flare up.  Patient currently on allopurinol 100 mg p.o. once daily.  Questions solicited and answered, patient verbalized and agreed to plan.

## 2024-03-08 NOTE — PROGRESS NOTES
PLEASE CALL PATIENTS WITH RESULTS  Hemoglobin A1c 6.3%.  Keep up the good work.  Continue with same diabetic medications.  Uric acid much improvement from 9 months ago.  Continue allopurinol 100 mg p.o. daily.  Follow low purine diet.  Keep Nephrology appointment as discussed.

## 2024-03-08 NOTE — PATIENT INSTRUCTIONS
Oracio Valdez,     If you are due for any health screening(s) below please notify me so we can arrange them to be ordered and scheduled. Most healthy patients at your age complete them, but you are free to accept or refuse.     If you can't do it, I'll definitely understand. If you can, I'd certainly appreciate it!    All of your core healthy metrics are met.

## 2024-03-08 NOTE — TELEPHONE ENCOUNTER
Called patient to give results. Patient verbalized understanding. No additional questions at this time.     ----- Message from QUINN Kilpatrick sent at 3/8/2024  3:11 PM CST -----  PLEASE CALL PATIENTS WITH RESULTS  Hemoglobin A1c 6.3%.  Keep up the good work.  Continue with same diabetic medications.  Uric acid much improvement from 9 months ago.  Continue allopurinol 100 mg p.o. daily.  Follow low purine diet.  Keep Nephrology appointment as discussed.

## 2024-03-08 NOTE — ASSESSMENT & PLAN NOTE
Patient state she is compliant with metformin 500 mg p.o. b.i.d. and Farxiga 10 mg p.o. once daily.  Will repeat Hemoglobin A1c.  Will notify patient of test results when it becomes available and adjust medication if needed.  -2 g a day dietary sodium restriction  -optimize glycemic control (goal A1c is less than 7%)  -control high blood pressure (goal blood pressure is less than 130/80),  -exercise at least 30 minutes a day, 5 days a week  -maintain healthy weight  -decrease or stop alcohol use  -do not smoke  -stay well hydrated (drink water only, avoid juices, sweet tea, and sodas)  -ask about staying up-to-date on vaccinations (flu vaccine, pneumonia vaccine, hepatitis B vaccine)  -avoid excessive use of NSAIDs (ibuprofen, naproxen, Aleve, Advil, Toradol, Mobic), take Tylenol as needed for headache or mild pain  -take cholesterol lowering medications if prescribed (LDL goal less than 100).  Questions solicited and answered, patient verbalized and agreed to plan.  Patient to return to clinic in June for follow-up.  Or sooner if needed.

## 2024-03-08 NOTE — PROGRESS NOTES
Patient Name: Courtney Easley   : 1970  MRN: [id number]     SUBJECTIVE DATA:    CHIEF COMPLAINT:   Courtney Easley is a 53 y.o. female who presents to clinic today with Diabetes and Spasms (Back)        HPI: Courtney Easley 53-year-old female presents to the clinic to follow-up on diabetes and requesting management of left-sided muscle spasm of back.    2023:  Diabetes type 2:  Last hemoglobin 1 AC was completed in 2023 at 6.8% with estimated average blood glucose at 148.  Discussed with the patient the importance to keep blood glucose averaging less than 130 and to get her hemoglobin A1c less than 6.5%.  Continue metformin 500 mg p.o. b.i.d. daily, follow diabetic diet meal planning, stay hydrated with water, stay physically active at least exercise 30 minutes a day up to 5 days a week as simple as brisk walking, follow low-salt, low fat diet, keep fiber intake at least 30 g per day if possible.  Hemoglobin A1c pending.  Return to clinic in 6 months or sooner if needed.  Questions solicited and answered, patient verbalized, patient to read discharge education material.  2023 addendum:  Current hemoglobin A1c 6.8%, continue with current treatment.    2024:   Diabetes:  Patient state she is compliant with metformin 500 mg p.o. b.i.d. and Farxiga 10 mg p.o. once daily.  Will repeat Hemoglobin A1c.  Will notify patient of test results when it becomes available and adjust medication if needed.  -2 g a day dietary sodium restriction  -optimize glycemic control (goal A1c is less than 7%)  -control high blood pressure (goal blood pressure is less than 130/80),  -exercise at least 30 minutes a day, 5 days a week  -maintain healthy weight  -decrease or stop alcohol use  -do not smoke  -stay well hydrated (drink water only, avoid juices, sweet tea, and sodas)  -ask about staying up-to-date on vaccinations (flu vaccine, pneumonia vaccine, hepatitis B vaccine)  -avoid excessive use of  "NSAIDs (ibuprofen, naproxen, Aleve, Advil, Toradol, Mobic), take Tylenol as needed for headache or mild pain  -take cholesterol lowering medications if prescribed (LDL goal less than 100).  Questions solicited and answered, patient verbalized and agreed to plan.  Patient to return to clinic in June for follow-up.  Or sooner if needed.    Left-sided mid back muscle spasm:  Patient state she is currently taking cyclobenzaprine 5 mg p.o. as needed for spasm.  Patient requesting another medication for during the day.  Discussed with patient all muscle relaxers causes dizziness and sleepiness and I do not recommend to be taken during the day during activity and work or driving.  Continue cyclobenzaprine 5 mg at bedtime as needed for muscle spasm, continue Tylenol Arthritis 650 p.o. every 8 hours as needed for pain, stay hydrated with water, lose weight, stay physically active at least 30 minutes a day up to 5 days a week as simple as brisk walking.  Repeat uric acid to rule out gout flare up.  Patient currently on allopurinol 100 mg p.o. once daily.  Questions solicited and answered, patient verbalized and agreed to plan.    Patient denies chest pain, shortness of breath, dyspnea on exertion, palpitations, peripheral edema, abdominal pain, nausea, vomiting, diarrhea, constipation, fatigue, fever, chills, dysuria,  hematuria, melena, or hematochezia.        ALLERGIES: Review of patient's allergies indicates:  No Known Allergies      ROS:  Review of Systems   Musculoskeletal:  Positive for back pain (Left-sided mid back spasm).   All other systems reviewed and are negative.        OBJECTIVE DATA:  Vital signs  Vitals:    03/08/24 0759   BP: 136/86   Pulse: 91   Resp: 18   Temp: 97.9 °F (36.6 °C)   TempSrc: Oral   SpO2: 98%   Weight: 119.7 kg (264 lb)   Height: 5' 2" (1.575 m)      Body mass index is 48.29 kg/m².    PHYSICAL EXAM:   Physical Exam  Vitals and nursing note reviewed.   Constitutional:       General: She is " awake. She is not in acute distress.     Appearance: Normal appearance. She is well-developed and well-groomed. She is morbidly obese. She is not ill-appearing, toxic-appearing or diaphoretic.   HENT:      Head: Normocephalic and atraumatic.      Right Ear: Tympanic membrane, ear canal and external ear normal.      Left Ear: Tympanic membrane, ear canal and external ear normal.      Nose: Nose normal.      Mouth/Throat:      Mouth: Mucous membranes are moist.      Pharynx: Oropharynx is clear. Uvula midline.   Eyes:      General: Lids are normal.      Extraocular Movements: Extraocular movements intact.      Conjunctiva/sclera: Conjunctivae normal.      Pupils: Pupils are equal, round, and reactive to light.   Neck:      Trachea: Trachea and phonation normal.   Cardiovascular:      Rate and Rhythm: Normal rate and regular rhythm.      Pulses: Normal pulses.           Radial pulses are 2+ on the right side and 2+ on the left side.      Heart sounds: Normal heart sounds. No murmur heard.  Pulmonary:      Effort: Pulmonary effort is normal.      Breath sounds: Normal breath sounds and air entry. No wheezing or rhonchi.   Abdominal:      Palpations: Abdomen is soft.      Tenderness: There is no abdominal tenderness.   Musculoskeletal:         General: Normal range of motion.      Cervical back: Normal, normal range of motion and neck supple. No rigidity or tenderness.      Thoracic back: Tenderness present. No swelling, deformity, spasms or bony tenderness. Normal range of motion.      Lumbar back: Normal.        Back:       Right lower leg: No edema.      Left lower leg: No edema.   Lymphadenopathy:      Cervical: No cervical adenopathy.   Skin:     General: Skin is warm and dry.      Capillary Refill: Capillary refill takes less than 2 seconds.   Neurological:      General: No focal deficit present.      Mental Status: She is alert and oriented to person, place, and time. Mental status is at baseline.      GCS: GCS  eye subscore is 4. GCS verbal subscore is 5. GCS motor subscore is 6.      Cranial Nerves: Cranial nerves 2-12 are intact. No cranial nerve deficit.      Sensory: Sensation is intact. No sensory deficit.      Motor: Motor function is intact. No weakness.      Coordination: Coordination is intact. Coordination normal.      Gait: Gait is intact. Gait normal.   Psychiatric:         Attention and Perception: Attention and perception normal.         Mood and Affect: Mood and affect normal.         Speech: Speech normal.         Behavior: Behavior normal. Behavior is cooperative.         Thought Content: Thought content normal.         Cognition and Memory: Cognition and memory normal.         Judgment: Judgment normal.          ASSESSMENT/PLAN:  1. Type 2 diabetes mellitus with hyperglycemia, without long-term current use of insulin  Assessment & Plan:  Patient state she is compliant with metformin 500 mg p.o. b.i.d. and Farxiga 10 mg p.o. once daily.  Will repeat Hemoglobin A1c.  Will notify patient of test results when it becomes available and adjust medication if needed.  -2 g a day dietary sodium restriction  -optimize glycemic control (goal A1c is less than 7%)  -control high blood pressure (goal blood pressure is less than 130/80),  -exercise at least 30 minutes a day, 5 days a week  -maintain healthy weight  -decrease or stop alcohol use  -do not smoke  -stay well hydrated (drink water only, avoid juices, sweet tea, and sodas)  -ask about staying up-to-date on vaccinations (flu vaccine, pneumonia vaccine, hepatitis B vaccine)  -avoid excessive use of NSAIDs (ibuprofen, naproxen, Aleve, Advil, Toradol, Mobic), take Tylenol as needed for headache or mild pain  -take cholesterol lowering medications if prescribed (LDL goal less than 100).  Questions solicited and answered, patient verbalized and agreed to plan.  Patient to return to clinic in June for follow-up.  Or sooner if needed.    Orders:  -     Hemoglobin  A1C  -     Uric Acid    2. 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 as needed for spasm.  Patient requesting another medication for during the day.  Discussed with patient all muscle relaxers causes dizziness and sleepiness and I do not recommend to be taken during the day during activity and work or driving.  Continue cyclobenzaprine 5 mg at bedtime as needed for muscle spasm, continue Tylenol Arthritis 650 p.o. every 8 hours as needed for pain, stay hydrated with water, lose weight, stay physically active at least 30 minutes a day up to 5 days a week as simple as brisk walking.  Repeat uric acid to rule out gout flare up.  Patient currently on allopurinol 100 mg p.o. once daily.  Questions solicited and answered, patient verbalized and agreed to plan.    Orders:  -     Uric Acid           RESULTS:  Recent Results (from the past 1008 hour(s))   Hemoglobin A1C    Collection Time: 03/08/24  8:36 AM   Result Value Ref Range    Hemoglobin A1c 6.3 <=7.0 %    Estimated Average Glucose 134.1 mg/dL   Uric Acid    Collection Time: 03/08/24  8:36 AM   Result Value Ref Range    Uric Acid 7.2 (H) 2.6 - 6.0 mg/dL         Follow Up:  Follow up in about 13 weeks (around 6/7/2024).      Previous medical history/lab work/radiology reviewed and considered during medical management decisions.   Medication list reviewed and medication reconciliation performed.  Patient was provided  and care about his/her current diagnosis (es) and medications including risk/benefit and side effects/adverse events, over the counter medication uses/doses, home self-care and contact precautions,  and red flags and indications for when to seek immediate medical attention.   Patient was advised to continue compliance with current medication list and medical recommendations.  Patient dvised continued compliance with recommended eating habits/  diets for medical conditions and exercise 150 minutes/ week (if possible) for medical condition (s).  Educational handouts and instructions on selected disease management in AVS (After Visit Summary).    All of the patient's questions were answered to patient's satisfaction.   The patient was receptive, expressed verbal understanding and agreement the above plan.    This note was created with the assistance of a voice recognition software or phone dictation. There may be transcription errors as a result of using this technology however minimal. Effort has been made to assure accuracy of transcription but any obvious errors or omissions should be clarified with the author of the document

## 2024-04-30 DIAGNOSIS — I10 PRIMARY HYPERTENSION: ICD-10-CM

## 2024-04-30 RX ORDER — HYDRALAZINE HYDROCHLORIDE 25 MG/1
25 TABLET, FILM COATED ORAL 2 TIMES DAILY
Qty: 180 TABLET | Refills: 3 | Status: SHIPPED | OUTPATIENT
Start: 2024-04-30

## 2024-06-07 ENCOUNTER — OFFICE VISIT (OUTPATIENT)
Dept: FAMILY MEDICINE | Facility: CLINIC | Age: 54
End: 2024-06-07
Payer: COMMERCIAL

## 2024-06-07 ENCOUNTER — TELEPHONE (OUTPATIENT)
Dept: FAMILY MEDICINE | Facility: CLINIC | Age: 54
End: 2024-06-07

## 2024-06-07 VITALS
HEART RATE: 97 BPM | HEIGHT: 62 IN | WEIGHT: 267 LBS | BODY MASS INDEX: 49.13 KG/M2 | OXYGEN SATURATION: 97 % | DIASTOLIC BLOOD PRESSURE: 87 MMHG | TEMPERATURE: 98 F | RESPIRATION RATE: 18 BRPM | SYSTOLIC BLOOD PRESSURE: 120 MMHG

## 2024-06-07 DIAGNOSIS — E66.01 MORBID OBESITY: ICD-10-CM

## 2024-06-07 DIAGNOSIS — N18.32 CKD STAGE G3B/A3, GFR 30-44 AND ALBUMIN CREATININE RATIO >300 MG/G: ICD-10-CM

## 2024-06-07 DIAGNOSIS — E11.65 TYPE 2 DIABETES MELLITUS WITH HYPERGLYCEMIA, WITHOUT LONG-TERM CURRENT USE OF INSULIN: Primary | ICD-10-CM

## 2024-06-07 LAB
CREAT UR-MCNC: 40.1 MG/DL (ref 45–106)
MICROALBUMIN UR-MCNC: 326.3 UG/ML
MICROALBUMIN/CREAT RATIO PNL UR: 813.7 MG/GM CR (ref 0–30)

## 2024-06-07 PROCEDURE — 1159F MED LIST DOCD IN RCRD: CPT | Mod: CPTII,,, | Performed by: NURSE PRACTITIONER

## 2024-06-07 PROCEDURE — 99215 OFFICE O/P EST HI 40 MIN: CPT | Mod: PBBFAC | Performed by: NURSE PRACTITIONER

## 2024-06-07 PROCEDURE — 3044F HG A1C LEVEL LT 7.0%: CPT | Mod: CPTII,,, | Performed by: NURSE PRACTITIONER

## 2024-06-07 PROCEDURE — 99214 OFFICE O/P EST MOD 30 MIN: CPT | Mod: S$PBB,,, | Performed by: NURSE PRACTITIONER

## 2024-06-07 PROCEDURE — 3008F BODY MASS INDEX DOCD: CPT | Mod: CPTII,,, | Performed by: NURSE PRACTITIONER

## 2024-06-07 PROCEDURE — 3074F SYST BP LT 130 MM HG: CPT | Mod: CPTII,,, | Performed by: NURSE PRACTITIONER

## 2024-06-07 PROCEDURE — 1160F RVW MEDS BY RX/DR IN RCRD: CPT | Mod: CPTII,,, | Performed by: NURSE PRACTITIONER

## 2024-06-07 PROCEDURE — 82043 UR ALBUMIN QUANTITATIVE: CPT | Performed by: NURSE PRACTITIONER

## 2024-06-07 PROCEDURE — 3079F DIAST BP 80-89 MM HG: CPT | Mod: CPTII,,, | Performed by: NURSE PRACTITIONER

## 2024-06-07 RX ORDER — DAPAGLIFLOZIN 10 MG/1
10 TABLET, FILM COATED ORAL DAILY
Qty: 90 TABLET | Refills: 3 | Status: SHIPPED | OUTPATIENT
Start: 2024-06-07

## 2024-06-07 NOTE — TELEPHONE ENCOUNTER
Called patient to give results. Patient verbalized understanding. No additional questions at this time.     ----- Message from QUINN Kilpatrick sent at 6/7/2024  2:46 PM CDT -----  PLEASE CALL PATIENTS WITH RESULT  Microalbumin continue to be elevated did not improve from last reading in 1 year.  Advise patient to make sure she takes her diabetic medications as prescribed.  Stay hydrated with water.

## 2024-06-07 NOTE — ASSESSMENT & PLAN NOTE
Last hemoglobin A1c on March 8, 2024 6.3%.  Patient currently compliance with Farxiga 10 mg p.o. daily, metformin 500 mg p.o. b.i.d. daily.  Discussed:  -2 g a day dietary sodium restriction  -optimize glycemic control (goal A1c is less than 7%)  -control high blood pressure (goal blood pressure is less than 130/80)  -exercise at least 30 minutes a day, 5 days a week  -maintain healthy weight  -decrease or stop alcohol use  -do not smoke  -stay well hydrated (drink water only, avoid juices, sweet tea, and sodas)  -ask about staying up-to-date on vaccinations (flu vaccine, pneumonia vaccine, hepatitis B vaccine)  -avoid excessive use of NSAIDs (ibuprofen, naproxen, Aleve, Advil, Toradol, Mobic), take Tylenol as needed for headache or mild pain  -take cholesterol lowering medications if prescribed (LDL goal less than 100).  Continue Crestor 10 mg p.o. nightly.  Lipids pending.  Keep appointment with Nephrology in July 20, 2024.

## 2024-06-07 NOTE — PROGRESS NOTES
PLEASE CALL PATIENTS WITH RESULT  Microalbumin continue to be elevated did not improve from last reading in 1 year.  Advise patient to make sure she takes her diabetic medications as prescribed.  Stay hydrated with water.

## 2024-06-07 NOTE — ASSESSMENT & PLAN NOTE
Keep appointment with Nephrology as directed.  You have an appointment coming up in July 20, 2024.  Take medications as prescribed.  Continue Norvasc 10 mg p.o. daily.  Continue hydralazine 25 mg twice a day.  Continue hydrochlorothiazide 25 mg p.o. daily.  Continue metoprolol 25 mg p.o. b.i.d. daily.

## 2024-06-07 NOTE — PROGRESS NOTES
Patient Name: Courtney Easley   : 1970  MRN: [id number]     SUBJECTIVE DATA:    CHIEF COMPLAINT:   Courtney Easley is a 54 y.o. female who presents to clinic today with Diabetes      HPI:  54-year-old female presents to the clinic to follow-up on diabetes.    2023:  Diabetes type 2:  Last hemoglobin 1 AC was completed in 2023 at 6.8% with estimated average blood glucose at 148.  Discussed with the patient the importance to keep blood glucose averaging less than 130 and to get her hemoglobin A1c less than 6.5%.  Continue metformin 500 mg p.o. b.i.d. daily, follow diabetic diet meal planning, stay hydrated with water, stay physically active at least exercise 30 minutes a day up to 5 days a week as simple as brisk walking, follow low-salt, low fat diet, keep fiber intake at least 30 g per day if possible.  Hemoglobin A1c pending.  Return to clinic in 6 months or sooner if needed.  Questions solicited and answered, patient verbalized, patient to read discharge education material.  2023 addendum:  Current hemoglobin A1c 6.8%, continue with current treatment.     2024:   Diabetes:  Patient state she is compliant with metformin 500 mg p.o. b.i.d. and Farxiga 10 mg p.o. once daily.  Will repeat Hemoglobin A1c.  Will notify patient of test results when it becomes available and adjust medication if needed.  -2 g a day dietary sodium restriction  -optimize glycemic control (goal A1c is less than 7%)  -control high blood pressure (goal blood pressure is less than 130/80),  -exercise at least 30 minutes a day, 5 days a week  -maintain healthy weight  -decrease or stop alcohol use  -do not smoke  -stay well hydrated (drink water only, avoid juices, sweet tea, and sodas)  -ask about staying up-to-date on vaccinations (flu vaccine, pneumonia vaccine, hepatitis B vaccine)  -avoid excessive use of NSAIDs (ibuprofen, naproxen, Aleve, Advil, Toradol, Mobic), take Tylenol as needed for headache  "or mild pain  -take cholesterol lowering medications if prescribed (LDL goal less than 100).  Questions solicited and answered, patient verbalized and agreed to plan.  Patient to return to clinic in June for follow-up.  Or sooner if needed.     06/07/2024:  Last hemoglobin A1c on March 8, 2024 6.3%.  Patient currently compliance with Farxiga 10 mg p.o. daily, metformin 500 mg p.o. b.i.d. daily.  Discussed:  -2 g a day dietary sodium restriction  -optimize glycemic control (goal A1c is less than 7%)  -control high blood pressure (goal blood pressure is less than 130/80)  -exercise at least 30 minutes a day, 5 days a week  -maintain healthy weight  -decrease or stop alcohol use  -do not smoke  -stay well hydrated (drink water only, avoid juices, sweet tea, and sodas)  -ask about staying up-to-date on vaccinations (flu vaccine, pneumonia vaccine, hepatitis B vaccine)  -avoid excessive use of NSAIDs (ibuprofen, naproxen, Aleve, Advil, Toradol, Mobic), take Tylenol as needed for headache or mild pain  -take cholesterol lowering medications if prescribed (LDL goal less than 100).  Continue Crestor 10 mg p.o. nightly.  Lipids pending.  Keep appointment with Nephrology in July 20, 2024.    Patient denies chest pain, shortness of breath, dyspnea on exertion, palpitations, peripheral edema, abdominal pain, nausea, vomiting, diarrhea, constipation, fatigue, fever, chills, dysuria,  hematuria, dark stools or bloody stools.      ALLERGIES: Review of patient's allergies indicates:  No Known Allergies      ROS:  Review of Systems   All other systems reviewed and are negative.        OBJECTIVE DATA:  Vital signs  Vitals:    06/07/24 0754   BP: 120/87   Pulse: 97   Resp: 18   Temp: 97.8 °F (36.6 °C)   TempSrc: Oral   SpO2: 97%   Weight: 121.1 kg (267 lb)   Height: 5' 2" (1.575 m)      Body mass index is 48.83 kg/m².    PHYSICAL EXAM:   Physical Exam  Vitals and nursing note reviewed.   Constitutional:       General: She is awake. " She is not in acute distress.     Appearance: Normal appearance. She is well-developed and well-groomed. She is morbidly obese. She is not ill-appearing, toxic-appearing or diaphoretic.   HENT:      Head: Normocephalic and atraumatic.      Nose: Nose normal.      Mouth/Throat:      Lips: Pink.      Mouth: Mucous membranes are moist.      Tongue: Tongue does not deviate from midline.      Pharynx: Oropharynx is clear. Uvula midline.   Eyes:      General: Lids are normal.      Extraocular Movements: Extraocular movements intact.      Pupils: Pupils are equal, round, and reactive to light.   Neck:      Trachea: Trachea and phonation normal.   Cardiovascular:      Rate and Rhythm: Normal rate and regular rhythm.      Pulses: Normal pulses.           Radial pulses are 2+ on the right side and 2+ on the left side.        Dorsalis pedis pulses are 2+ on the right side and 2+ on the left side.        Posterior tibial pulses are 2+ on the right side and 2+ on the left side.      Heart sounds: Normal heart sounds. No murmur heard.  Pulmonary:      Effort: Pulmonary effort is normal.      Breath sounds: Normal breath sounds and air entry. No wheezing or rhonchi.   Abdominal:      Palpations: Abdomen is soft.   Genitourinary:     Comments: Denies any urinary symptoms or bowel habit changes.  Musculoskeletal:         General: Normal range of motion.      Cervical back: Normal range of motion and neck supple. No rigidity or tenderness.      Right lower leg: No edema.      Left lower leg: No edema.      Right foot: Normal range of motion. No deformity, bunion, Charcot foot, foot drop or prominent metatarsal heads.      Left foot: Normal range of motion. No deformity, bunion, Charcot foot, foot drop or prominent metatarsal heads.   Feet:      Right foot:      Protective Sensation: 10 sites tested.  10 sites sensed.      Skin integrity: Skin integrity normal.      Toenail Condition: Right toenails are abnormally thick.      Left  foot:      Protective Sensation: 10 sites tested.  10 sites sensed.      Skin integrity: Skin integrity normal.      Toenail Condition: Left toenails are abnormally thick.   Lymphadenopathy:      Cervical: No cervical adenopathy.   Skin:     General: Skin is warm and dry.      Capillary Refill: Capillary refill takes less than 2 seconds.      Findings: No rash.   Neurological:      General: No focal deficit present.      Mental Status: She is alert and oriented to person, place, and time. Mental status is at baseline.      GCS: GCS eye subscore is 4. GCS verbal subscore is 5. GCS motor subscore is 6.      Cranial Nerves: No cranial nerve deficit.      Sensory: No sensory deficit.      Motor: No weakness.      Coordination: Coordination normal.      Gait: Gait normal.   Psychiatric:         Attention and Perception: Attention and perception normal.         Mood and Affect: Mood and affect normal.         Speech: Speech normal.         Behavior: Behavior normal. Behavior is cooperative.         Thought Content: Thought content normal.         Cognition and Memory: Cognition and memory normal.         Judgment: Judgment normal.          ASSESSMENT/PLAN:  1. Type 2 diabetes mellitus with hyperglycemia, without long-term current use of insulin  Assessment & Plan:  Last hemoglobin A1c on March 8, 2024 6.3%.  Patient currently compliance with Farxiga 10 mg p.o. daily, metformin 500 mg p.o. b.i.d. daily.  Discussed:  -2 g a day dietary sodium restriction  -optimize glycemic control (goal A1c is less than 7%)  -control high blood pressure (goal blood pressure is less than 130/80)  -exercise at least 30 minutes a day, 5 days a week  -maintain healthy weight  -decrease or stop alcohol use  -do not smoke  -stay well hydrated (drink water only, avoid juices, sweet tea, and sodas)  -ask about staying up-to-date on vaccinations (flu vaccine, pneumonia vaccine, hepatitis B vaccine)  -avoid excessive use of NSAIDs (ibuprofen,  naproxen, Aleve, Advil, Toradol, Mobic), take Tylenol as needed for headache or mild pain  -take cholesterol lowering medications if prescribed (LDL goal less than 100).  Continue Crestor 10 mg p.o. nightly.  Lipids pending.  Keep appointment with Nephrology in July 20, 2024.    Orders:  -     Microalbumin/Creatinine Ratio, Urine  -     Foot Exam Performed  -     Ambulatory referral/consult to Ophthalmology; Future; Expected date: 09/09/2024  -     Lipid Panel  -     dapagliflozin propanediol (FARXIGA) 10 mg tablet; Take 1 tablet (10 mg total) by mouth once daily.  Dispense: 90 tablet; Refill: 3    2. CKD stage G3b/A3, GFR 30-44 and albumin creatinine ratio >300 mg/g  Assessment & Plan:  Keep appointment with Nephrology as directed.  You have an appointment coming up in July 20, 2024.  Take medications as prescribed.  Continue Norvasc 10 mg p.o. daily.  Continue hydralazine 25 mg twice a day.  Continue hydrochlorothiazide 25 mg p.o. daily.  Continue metoprolol 25 mg p.o. b.i.d. daily.      3. Morbid obesity  Assessment & Plan:  -2 g a day dietary sodium restriction  -optimize glycemic control (goal A1c is less than 7%)  -control high blood pressure (goal blood pressure is less than 130/80, -exercise at least 30 minutes a day, 5 days a week  -maintain healthy weight  -decrease or stop alcohol use  -do not smoke  -stay well hydrated (drink water only, avoid juices, sweet tea, and sodas)  -take cholesterol lowering medications if prescribed (LDL goal less than 100)               RESULTS:  Recent Results (from the past 1008 hour(s))   Microalbumin/Creatinine Ratio, Urine    Collection Time: 06/07/24  8:53 AM   Result Value Ref Range    Urine Microalbumin 326.3 (H) <=30.0 ug/mL    Urine Creatinine 40.1 (L) 45.0 - 106.0 mg/dL    Microalbumin Creatinine Ratio 813.7 (H) 0.0 - 30.0 mg/gm Cr         Follow Up:  Follow up in about 14 weeks (around 9/13/2024).      Previous medical history/lab work/radiology reviewed and  considered during medical management decisions.   Medication list reviewed and medication reconciliation performed.  Patient was provided  and care about his/her current diagnosis (es) and medications including risk/benefit and side effects/adverse events, over the counter medication uses/doses, home self-care and contact precautions,  and red flags and indications for when to seek immediate medical attention.   Patient was advised to continue compliance with current medication list and medical recommendations.  Patient dvised continued compliance with recommended eating habits/ diets for medical conditions and exercise 150 minutes/ week (if possible) for medical condition (s).  Educational handouts and instructions on selected disease management in AVS (After Visit Summary).    All of the patient's questions were answered to patient's satisfaction.   The patient was receptive, expressed verbal understanding and agreement the above plan.          This note was created with the assistance of a voice recognition software or phone dictation. There may be transcription errors as a result of using this technology however minimal. Effort has been made to assure accuracy of transcription but any obvious errors or omissions should be clarified with the author of the document

## 2024-06-07 NOTE — ASSESSMENT & PLAN NOTE
-2 g a day dietary sodium restriction  -optimize glycemic control (goal A1c is less than 7%)  -control high blood pressure (goal blood pressure is less than 130/80, -exercise at least 30 minutes a day, 5 days a week  -maintain healthy weight  -decrease or stop alcohol use  -do not smoke  -stay well hydrated (drink water only, avoid juices, sweet tea, and sodas)  -take cholesterol lowering medications if prescribed (LDL goal less than 100)

## 2024-07-16 ENCOUNTER — LAB VISIT (OUTPATIENT)
Dept: LAB | Facility: HOSPITAL | Age: 54
End: 2024-07-16
Attending: NURSE PRACTITIONER
Payer: COMMERCIAL

## 2024-07-16 DIAGNOSIS — N18.32 CKD STAGE G3B/A3, GFR 30-44 AND ALBUMIN CREATININE RATIO >300 MG/G: ICD-10-CM

## 2024-07-16 DIAGNOSIS — E79.0 HYPERURICEMIA: ICD-10-CM

## 2024-07-16 LAB
ALBUMIN SERPL-MCNC: 3.6 G/DL (ref 3.5–5)
ALBUMIN/GLOB SERPL: 1.1 RATIO (ref 1.1–2)
ALP SERPL-CCNC: 71 UNIT/L (ref 40–150)
ALT SERPL-CCNC: 13 UNIT/L (ref 0–55)
ANION GAP SERPL CALC-SCNC: 8 MEQ/L
AST SERPL-CCNC: 16 UNIT/L (ref 5–34)
BASOPHILS # BLD AUTO: 0.03 X10(3)/MCL
BASOPHILS NFR BLD AUTO: 0.4 %
BILIRUB SERPL-MCNC: 0.5 MG/DL
BUN SERPL-MCNC: 19.4 MG/DL (ref 9.8–20.1)
CALCIUM SERPL-MCNC: 10.1 MG/DL (ref 8.4–10.2)
CHLORIDE SERPL-SCNC: 105 MMOL/L (ref 98–107)
CO2 SERPL-SCNC: 27 MMOL/L (ref 22–29)
CREAT SERPL-MCNC: 1.85 MG/DL (ref 0.55–1.02)
CREAT/UREA NIT SERPL: 10
EOSINOPHIL # BLD AUTO: 0.19 X10(3)/MCL (ref 0–0.9)
EOSINOPHIL NFR BLD AUTO: 2.4 %
ERYTHROCYTE [DISTWIDTH] IN BLOOD BY AUTOMATED COUNT: 15.6 % (ref 11.5–17)
GFR SERPLBLD CREATININE-BSD FMLA CKD-EPI: 32 ML/MIN/1.73/M2
GLOBULIN SER-MCNC: 3.4 GM/DL (ref 2.4–3.5)
GLUCOSE SERPL-MCNC: 122 MG/DL (ref 74–100)
HCT VFR BLD AUTO: 40.6 % (ref 37–47)
HGB BLD-MCNC: 13.6 G/DL (ref 12–16)
IMM GRANULOCYTES # BLD AUTO: 0.01 X10(3)/MCL (ref 0–0.04)
IMM GRANULOCYTES NFR BLD AUTO: 0.1 %
LYMPHOCYTES # BLD AUTO: 2.36 X10(3)/MCL (ref 0.6–4.6)
LYMPHOCYTES NFR BLD AUTO: 29.2 %
MCH RBC QN AUTO: 25 PG (ref 27–31)
MCHC RBC AUTO-ENTMCNC: 33.5 G/DL (ref 33–36)
MCV RBC AUTO: 74.8 FL (ref 80–94)
MONOCYTES # BLD AUTO: 0.71 X10(3)/MCL (ref 0.1–1.3)
MONOCYTES NFR BLD AUTO: 8.8 %
NEUTROPHILS # BLD AUTO: 4.77 X10(3)/MCL (ref 2.1–9.2)
NEUTROPHILS NFR BLD AUTO: 59.1 %
NRBC BLD AUTO-RTO: 0 %
PHOSPHATE SERPL-MCNC: 2.6 MG/DL (ref 2.3–4.7)
PLATELET # BLD AUTO: 232 X10(3)/MCL (ref 130–400)
PMV BLD AUTO: 10.4 FL (ref 7.4–10.4)
POTASSIUM SERPL-SCNC: 3.6 MMOL/L (ref 3.5–5.1)
PROT SERPL-MCNC: 7 GM/DL (ref 6.4–8.3)
PTH-INTACT SERPL-MCNC: 138.4 PG/ML (ref 8.7–77)
RBC # BLD AUTO: 5.43 X10(6)/MCL (ref 4.2–5.4)
SODIUM SERPL-SCNC: 140 MMOL/L (ref 136–145)
WBC # BLD AUTO: 8.07 X10(3)/MCL (ref 4.5–11.5)

## 2024-07-16 PROCEDURE — 85025 COMPLETE CBC W/AUTO DIFF WBC: CPT

## 2024-07-16 PROCEDURE — 80053 COMPREHEN METABOLIC PANEL: CPT

## 2024-07-16 PROCEDURE — 83970 ASSAY OF PARATHORMONE: CPT

## 2024-07-16 PROCEDURE — 84100 ASSAY OF PHOSPHORUS: CPT

## 2024-07-16 PROCEDURE — 36415 COLL VENOUS BLD VENIPUNCTURE: CPT

## 2024-07-17 RX ORDER — ALLOPURINOL 100 MG/1
100 TABLET ORAL
Qty: 90 TABLET | Refills: 3 | Status: SHIPPED | OUTPATIENT
Start: 2024-07-17

## 2024-07-22 ENCOUNTER — OFFICE VISIT (OUTPATIENT)
Dept: NEPHROLOGY | Facility: CLINIC | Age: 54
End: 2024-07-22
Payer: COMMERCIAL

## 2024-07-22 VITALS
OXYGEN SATURATION: 97 % | SYSTOLIC BLOOD PRESSURE: 144 MMHG | WEIGHT: 267.44 LBS | TEMPERATURE: 98 F | BODY MASS INDEX: 49.21 KG/M2 | DIASTOLIC BLOOD PRESSURE: 94 MMHG | HEART RATE: 80 BPM | RESPIRATION RATE: 20 BRPM | HEIGHT: 62 IN

## 2024-07-22 DIAGNOSIS — I10 PRIMARY HYPERTENSION: ICD-10-CM

## 2024-07-22 DIAGNOSIS — E66.01 SEVERE OBESITY (BMI >= 40): ICD-10-CM

## 2024-07-22 DIAGNOSIS — N18.32 CKD STAGE G3B/A3, GFR 30-44 AND ALBUMIN CREATININE RATIO >300 MG/G: Primary | ICD-10-CM

## 2024-07-22 DIAGNOSIS — E11.65 TYPE 2 DIABETES MELLITUS WITH HYPERGLYCEMIA, WITHOUT LONG-TERM CURRENT USE OF INSULIN: ICD-10-CM

## 2024-07-22 PROCEDURE — 1159F MED LIST DOCD IN RCRD: CPT | Mod: CPTII,,, | Performed by: NURSE PRACTITIONER

## 2024-07-22 PROCEDURE — 99215 OFFICE O/P EST HI 40 MIN: CPT | Mod: PBBFAC | Performed by: NURSE PRACTITIONER

## 2024-07-22 PROCEDURE — 3062F POS MACROALBUMINURIA REV: CPT | Mod: CPTII,,, | Performed by: NURSE PRACTITIONER

## 2024-07-22 PROCEDURE — 3080F DIAST BP >= 90 MM HG: CPT | Mod: CPTII,,, | Performed by: NURSE PRACTITIONER

## 2024-07-22 PROCEDURE — 3044F HG A1C LEVEL LT 7.0%: CPT | Mod: CPTII,,, | Performed by: NURSE PRACTITIONER

## 2024-07-22 PROCEDURE — 3066F NEPHROPATHY DOC TX: CPT | Mod: CPTII,,, | Performed by: NURSE PRACTITIONER

## 2024-07-22 PROCEDURE — 3008F BODY MASS INDEX DOCD: CPT | Mod: CPTII,,, | Performed by: NURSE PRACTITIONER

## 2024-07-22 PROCEDURE — 3077F SYST BP >= 140 MM HG: CPT | Mod: CPTII,,, | Performed by: NURSE PRACTITIONER

## 2024-07-22 PROCEDURE — 99214 OFFICE O/P EST MOD 30 MIN: CPT | Mod: S$PBB,,, | Performed by: NURSE PRACTITIONER

## 2024-07-22 RX ORDER — IRBESARTAN 150 MG/1
150 TABLET ORAL NIGHTLY
Qty: 90 TABLET | Refills: 3 | Status: SHIPPED | OUTPATIENT
Start: 2024-07-22 | End: 2025-07-22

## 2024-07-22 RX ORDER — LEVOCETIRIZINE DIHYDROCHLORIDE 5 MG/1
5 TABLET, FILM COATED ORAL EVERY OTHER DAY
COMMUNITY

## 2024-07-22 NOTE — PROGRESS NOTES
"  [facility]  Nephrology Clinic Note    Chief complaint: Chronic Kidney Disease (Follow up)    History of present illness:   Courtney Easley is a 54 y.o. Black or  female with past medical history of CKD, hypertension, sickle cell trait, anemia, left ventricular hypertrophy, IGT, hepatitis B, and morbid obesity. Patient presents for follow-up appointment in nephrology clinic today. C/o sciatica type pain, no symptoms of saddle anesthesia.     Review of Systems  12 point review of systems conducted, negative except as stated in the history of present illness.    Allergies: Patient has No Known Allergies.     Past Medical History:  has a past medical history of Anemia, unspecified, CKD (chronic kidney disease), HTN (hypertension), IGT (impaired glucose tolerance), Left ventricular hypertrophy, Morbid obesity, Personal history of colonic polyps, Sickle cell trait, and Type 2 diabetes mellitus with unspecified diabetic retinopathy without macular edema.    Procedure History:  has a past surgical history that includes Cystoscopy (07/19/2018); Cystourethroscopy (07/19/2018); Hysterectomy (07/19/2018); Salpingectomy (Bilateral, 07/19/2018); Tubal ligation; and Colonoscopy (04/26/2023).    Family History: family history includes Aneurysm in her sister; Cancer in her mother; Diabetes in her sister; Heart disease in her brother; Hypertension in her brother and father.    Social History:  reports that she has never smoked. She has never used smokeless tobacco. She reports current alcohol use of about 2.0 standard drinks of alcohol per week. She reports that she does not use drugs.    Physical exam  BP (!) 144/94 (BP Location: Left arm, Patient Position: Sitting, BP Method: Large (Manual))   Pulse 80   Temp 98.3 °F (36.8 °C) (Oral)   Resp 20   Ht 5' 2" (1.575 m)   Wt 121.3 kg (267 lb 6.7 oz)   SpO2 97%   BMI 48.91 kg/m²   General appearance: Patient is in no acute " distress.  Skin: No rashes or wounds.  HEENT: PERRLA, EOMI, no scleral icterus, no JVD. Neck is supple.  Chest: Respirations are unlabored. Lungs sounds are clear.   Heart: S1, S2.   Abdomen: Benign. Obese   : Deferred.  Extremities: BLE pitting trace edema, peripheral pulses are palpable.   Neuro: No focal deficits.     Home Medications:    Current Outpatient Medications:     allopurinoL (ZYLOPRIM) 100 MG tablet, TAKE 1 TABLET BY MOUTH EVERY DAY, Disp: 90 tablet, Rfl: 3    amLODIPine (NORVASC) 10 MG tablet, TAKE 1 TABLET BY MOUTH EVERY DAY, Disp: 90 tablet, Rfl: 3    aspirin (ECOTRIN) 81 MG EC tablet, TAKE 1 TABLET BY MOUTH EVERY DAY, Disp: 90 tablet, Rfl: 3    calcium carbonate (OS-TO) 600 mg calcium (1,500 mg) Tab, Take 600 mg by mouth once daily., Disp: , Rfl:     dapagliflozin propanediol (FARXIGA) 10 mg tablet, Take 1 tablet (10 mg total) by mouth once daily., Disp: 90 tablet, Rfl: 3    docusate sodium (COLACE) 50 MG capsule, Take 50 mg by mouth once daily., Disp: , Rfl:     hydrALAZINE (APRESOLINE) 25 MG tablet, TAKE 1 TABLET BY MOUTH TWICE A DAY, Disp: 180 tablet, Rfl: 3    hydroCHLOROthiazide (HYDRODIURIL) 25 MG tablet, TAKE 1 TABLET BY MOUTH EVERY DAY, Disp: 90 tablet, Rfl: 3    levocetirizine (XYZAL) 5 MG tablet, Take 5 mg by mouth every other day., Disp: , Rfl:     metFORMIN (GLUCOPHAGE) 500 MG tablet, Take 1 tablet (500 mg total) by mouth 2 (two) times daily with meals., Disp: 180 tablet, Rfl: 3    metoprolol tartrate (LOPRESSOR) 25 MG tablet, TAKE 1 TABLET BY MOUTH TWICE A DAY, Disp: 180 tablet, Rfl: 3    rosuvastatin (CRESTOR) 10 MG tablet, Take 1 tablet (10 mg total) by mouth every evening., Disp: 90 tablet, Rfl: 3    vitamin D (VITAMIN D3) 1000 units Tab, Take 1,000 Units by mouth once daily., Disp: , Rfl:     irbesartan (AVAPRO) 150 MG tablet, Take 1 tablet (150 mg total) by mouth every evening., Disp: 90 tablet, Rfl: 3    Laboratory data    Lab Results   Component Value Date    WBC 8.07  07/16/2024    HGB 13.6 07/16/2024    HCT 40.6 07/16/2024     07/16/2024     07/16/2024    K 3.6 07/16/2024    CO2 27 07/16/2024    BUN 19.4 07/16/2024    CREATININE 1.85 (H) 07/16/2024    EGFRNORACEVR 32 07/16/2024    GLUCOSE 122 (H) 07/16/2024    CALCIUM 10.1 07/16/2024    ALKPHOS 71 07/16/2024    LABPROT 7.0 07/16/2024    ALBUMIN 3.6 07/16/2024    BILIDIR 0.2 02/04/2022    IBILI 0.40 02/04/2022    AST 16 07/16/2024    ALT 13 07/16/2024    PHOS 2.6 07/16/2024      Lab Results   Component Value Date    HGBA1C 6.3 03/08/2024    .4 (H) 07/16/2024    HIV Nonreactive 02/19/2021    HEPCAB Nonreactive 02/19/2021    HEPBSAB Nonreactive 11/04/2021    HEPBCAB Reactive (A) 02/19/2021    MSPIKEPCT 3.51 06/08/2021     Urine:  Lab Results   Component Value Date    APPEARANCEUA Clear 07/16/2024    SGUA 1.010 07/16/2024    PROTEINUA 1+ (A) 07/16/2024    KETONESUA Negative 07/16/2024    LEUKOCYTESUR Negative 07/16/2024    RBCUA 0-5 07/16/2024    WBCUA 0-5 07/16/2024    BACTERIA None Seen 07/16/2024    SQEPUA Trace (A) 07/16/2024    HYALINECASTS None Seen 07/16/2024    CREATRANDUR 65.8 07/16/2024    PROTEINURINE 72.9 07/16/2024    UPROTCREA 1.1 07/16/2024         Imaging  US Retroperitoneal Limited 11/30/2020  The right kidney measures 9.8 x 5.4 x 3.9 cm and is unremarkable.  There is no right hydronephrosis.  The left kidney measures 9.9 x 5.1 x 5.0 cm and demonstrates a 9 mm  simple cyst. There is no left hydronephrosis. The left kidney is  otherwise unremarkable.  IMPRESSION:    1. 9 mm simple left renal cyst.  2. Otherwise unremarkable exam.  Electronically Signed By: Kali Juárez MD  Date/Time Signed: 11/30/2020 12:13      Impression    ICD-10-CM ICD-9-CM   1. CKD stage G3b/A3, GFR 30-44 and albumin creatinine ratio >300 mg/g  N18.32 585.3   2. Primary hypertension  I10 401.9   3. Type 2 diabetes mellitus with hyperglycemia, without long-term current use of insulin  E11.65 250.00     790.29   4. Severe  obesity (BMI >= 40)  E66.01 278.01        Plan  CKD stage G3b/A3, GFR 30-44 and albumin creatinine ratio >300 mg/g  -     Comprehensive Metabolic Panel; Future; Expected date: 01/10/2025  -     CBC Auto Differential; Future; Expected date: 01/10/2025  -     Protein/Creatinine Ratio, Urine; Future; Expected date: 01/10/2025  -     PTH, Intact; Future; Expected date: 01/10/2025  -     Urinalysis, Reflex to Urine Culture; Future; Expected date: 01/10/2025  Renal indices are stable. Proteinuria is in the non-nephrotic range. Proteinuria work-up revealed negative SIMRAN, negative ANCA, negative HIV, negative hepatitis C antibody, positive hepatitis B surface antigen, and SPEP positive for M spike. Imaging of the kidneys was unremarkable.   Skeletal survey on 11/23/2021 revealed no suspicious bony lesions. Patient has established care in ID clinic for management of chronic inactive hepatitis B, currently not on treatment.  Patient has been evaluated by hematology clinic for MGUS, no treatment was recommended.    MGRS is less likely due to stable serum creatinine levels and stable urinary protein excretion.  Continue risk factor management, MAKENZIE blockade, SGLT2i  and periodic monitoring.      Primary hypertension  -     irbesartan (AVAPRO) 150 MG tablet; Take 1 tablet (150 mg total) by mouth every evening.  Dispense: 90 tablet; Refill: 3  BP is above goal.   Will resume Irbesartan.     Type 2 diabetes mellitus with hyperglycemia, without long-term current use of insulin  A1C is at goal. Continue SGLT2i, will resume ARB.     Severe obesity (BMI >= 40)  Lifestyle and dietary interventions discussed, patient encouraged to maintain non-sedentary lifestyle and well-balanced diet.       Follow up in about 6 months (around 1/22/2025).     7/22/2024  Ketty Dumas NP  [facility]

## 2024-09-19 ENCOUNTER — HOSPITAL ENCOUNTER (OUTPATIENT)
Dept: RADIOLOGY | Facility: HOSPITAL | Age: 54
Discharge: HOME | End: 2024-09-19
Attending: NURSE PRACTITIONER
Payer: COMMERCIAL

## 2024-09-19 DIAGNOSIS — Z12.31 ENCOUNTER FOR SCREENING MAMMOGRAM FOR BREAST CANCER: ICD-10-CM

## 2024-09-19 DIAGNOSIS — Z76.89 ENCOUNTER TO ESTABLISH CARE: ICD-10-CM

## 2024-09-19 PROCEDURE — 77063 BREAST TOMOSYNTHESIS BI: CPT | Mod: 26,,, | Performed by: RADIOLOGY

## 2024-09-19 PROCEDURE — 77067 SCR MAMMO BI INCL CAD: CPT | Mod: TC

## 2024-09-19 PROCEDURE — 77067 SCR MAMMO BI INCL CAD: CPT | Mod: 26,,, | Performed by: RADIOLOGY

## 2024-09-20 ENCOUNTER — TELEPHONE (OUTPATIENT)
Dept: FAMILY MEDICINE | Facility: CLINIC | Age: 54
End: 2024-09-20

## 2024-09-20 ENCOUNTER — OFFICE VISIT (OUTPATIENT)
Dept: FAMILY MEDICINE | Facility: CLINIC | Age: 54
End: 2024-09-20
Payer: COMMERCIAL

## 2024-09-20 VITALS
BODY MASS INDEX: 49.13 KG/M2 | DIASTOLIC BLOOD PRESSURE: 88 MMHG | RESPIRATION RATE: 18 BRPM | TEMPERATURE: 98 F | OXYGEN SATURATION: 98 % | HEIGHT: 62 IN | SYSTOLIC BLOOD PRESSURE: 132 MMHG | HEART RATE: 69 BPM | WEIGHT: 267 LBS

## 2024-09-20 DIAGNOSIS — B18.1 CHRONIC HEPATITIS B VIRUS INFECTION: ICD-10-CM

## 2024-09-20 DIAGNOSIS — E11.65 TYPE 2 DIABETES MELLITUS WITH HYPERGLYCEMIA, WITHOUT LONG-TERM CURRENT USE OF INSULIN: Primary | ICD-10-CM

## 2024-09-20 DIAGNOSIS — D57.3 SICKLE CELL TRAIT: ICD-10-CM

## 2024-09-20 DIAGNOSIS — J30.89 SEASONAL ALLERGIC RHINITIS DUE TO OTHER ALLERGIC TRIGGER: ICD-10-CM

## 2024-09-20 DIAGNOSIS — M62.830 BACK MUSCLE SPASM: ICD-10-CM

## 2024-09-20 LAB
CHOLEST SERPL-MCNC: 124 MG/DL
CHOLEST/HDLC SERPL: 3 {RATIO} (ref 0–5)
EST. AVERAGE GLUCOSE BLD GHB EST-MCNC: 142.7 MG/DL
HBA1C MFR BLD: 6.6 %
HDLC SERPL-MCNC: 45 MG/DL (ref 35–60)
LDLC SERPL CALC-MCNC: 53 MG/DL (ref 50–140)
TRIGL SERPL-MCNC: 129 MG/DL (ref 37–140)
VLDLC SERPL CALC-MCNC: 26 MG/DL

## 2024-09-20 PROCEDURE — 99215 OFFICE O/P EST HI 40 MIN: CPT | Mod: PBBFAC | Performed by: NURSE PRACTITIONER

## 2024-09-20 PROCEDURE — 80061 LIPID PANEL: CPT | Performed by: NURSE PRACTITIONER

## 2024-09-20 PROCEDURE — 83036 HEMOGLOBIN GLYCOSYLATED A1C: CPT | Performed by: NURSE PRACTITIONER

## 2024-09-20 PROCEDURE — 36415 COLL VENOUS BLD VENIPUNCTURE: CPT | Performed by: NURSE PRACTITIONER

## 2024-09-20 RX ORDER — OXYMETAZOLINE HCL 0.05 %
2 SPRAY, NON-AEROSOL (ML) NASAL 2 TIMES DAILY
Qty: 15 ML | Refills: 0 | Status: SHIPPED | OUTPATIENT
Start: 2024-09-20 | End: 2024-09-23

## 2024-09-20 RX ORDER — TIZANIDINE 4 MG/1
4 TABLET ORAL NIGHTLY PRN
Qty: 20 TABLET | Refills: 1 | Status: SHIPPED | OUTPATIENT
Start: 2024-09-20

## 2024-09-20 NOTE — ASSESSMENT & PLAN NOTE
Patient state she gets this spasm to mid back every once in a while especially if she has been sitting for long period of time.  Thoracic paraspinal muscle skeletal tightness noted on palpation.  Patient denies any pain at the spine.  Patient denies any urinary incontinence bowel habit changes or tingling or weakness.  Patient denies any recent fall or trauma.  Discussed with patient to start daily stretching in the morning or when it possible.  Stay hydrated with water.  Stay physically active as simple as brisk walking 30 minutes a day up to 5 days a week.  Lose weight.  Rx tizanidine 4 mg p.o. as needed for spasm or pain take at bedtime.  Precaution discussed.  Patient to return to clinic if no improvement.  Questions solicited and answered, patient verbalized and agreed to plan.

## 2024-09-20 NOTE — PROGRESS NOTES
PLEASE CALL PATIENTS WITH RESULTS,  Hemoglobin A1c within normal range, 6.6%, keep up the good work, continue medications as prescribed such as metformin 500 mg twice a day and Farxiga 10 mg p.o. daily.  Cholesterol within normal range, continue cholesterol medication rosuvastatin 10 mg p.o. nightly  Great work  Return to clinic as discussed.

## 2024-09-20 NOTE — PROGRESS NOTES
Patient Name: Courtney Easley   : 1970  MRN: [id number]     SUBJECTIVE DATA:    CHIEF COMPLAINT:   Courtney Easley is a 54 y.o. female who presents to clinic today with Diabetes and Sinus Problem        HPI: Courtney Easley is a 54 y.o.female presents to the clinic to follow-up on diabetes and requesting medication for seasonal allergies, and evaluation for mid back spasm. Past medical history of CKD, hypertension, sickle cell trait, anemia, left ventricular hypertrophy, IGT, hepatitis B, and morbid obesity.     2024:  Last hemoglobin A1c on 2024 6.3%.  Patient currently compliance with Farxiga 10 mg p.o. daily, metformin 500 mg p.o. b.i.d. daily.  Discussed:  -2 g a day dietary sodium restriction  -optimize glycemic control (goal A1c is less than 7%)  -control high blood pressure (goal blood pressure is less than 130/80)  -exercise at least 30 minutes a day, 5 days a week  -maintain healthy weight  -decrease or stop alcohol use  -do not smoke  -stay well hydrated (drink water only, avoid juices, sweet tea, and sodas)  -ask about staying up-to-date on vaccinations (flu vaccine, pneumonia vaccine, hepatitis B vaccine)  -avoid excessive use of NSAIDs (ibuprofen, naproxen, Aleve, Advil, Toradol, Mobic), take Tylenol as needed for headache or mild pain  -take cholesterol lowering medications if prescribed (LDL goal less than 100).  Continue Crestor 10 mg p.o. nightly.  Lipids pending.  Keep appointment with Nephrology in 2024.    2024  Diabetes:  Last hemoglobin 1 AC was completed on 2024 at 6.3% Patient currently compliant with metformin 500 mg p.o. b.i.d., Farxiga 10 mg p.o. once daily.  Advise patient to continue to follow diabetic diet meal planning.  Weight loss.  Stay hydrated with water.  Exercise.  Lipid, hemoglobin 1 AC pending  Patient to read discharge education materials.  Questions solicited and answered, patient verbalized and agreed to  plan  Addendum:  Hemoglobin A1c 6.6%      Sinus issues:  Past medical history of seasonal allergic rhinitis.  Patient currently on Xyzal 5 mg p.o. every other day.  Patient state she feels congested and requesting medication.  Patient stated the symptoms started about a week ago, denies contact with any sick individuals.  Patient denies any fever or chills or nausea or vomiting or short of breath or chest pain.  Insurance did not approve Flonase nasal spray.  Discussed initiation Afrin as needed twice a day for 3 days.  Stay hydrated with water.  Return to clinic if no improvement.  Patient verbalized understanding and agreed to plan.    Mid back muscle spasm:  Patient state she gets this spasm to mid back every once in a while especially if she has been sitting for long period of time.  Thoracic paraspinal muscle skeletal tightness noted on palpation.  Patient denies any pain at the spine.  Patient denies any urinary incontinence bowel habit changes or tingling or weakness.  Patient denies any recent fall or trauma.  Discussed with patient to start daily stretching in the morning or when it possible.  Stay hydrated with water.  Stay physically active as simple as brisk walking 30 minutes a day up to 5 days a week.  Lose weight.  Rx tizanidine 4 mg p.o. as needed for spasm or pain take at bedtime.  Precaution discussed.  Patient to return to clinic if no improvement.  Questions solicited and answered, patient verbalized and agreed to plan.      Patient denies chest pain, shortness of breath, dyspnea on exertion, palpitations, peripheral edema, abdominal pain, nausea, vomiting, diarrhea, constipation, fatigue, fever, chills, dysuria,  hematuria, dark stools or bloody stools.        ALLERGIES: Review of patient's allergies indicates:  No Known Allergies      ROS:  Review of Systems   HENT:  Positive for congestion.    Musculoskeletal:  Positive for back pain.   All other systems reviewed and are  "negative.        OBJECTIVE DATA:  Vital signs  Vitals:    09/20/24 0803   BP: 132/88   Pulse: 69   Resp: 18   Temp: 97.7 °F (36.5 °C)   TempSrc: Oral   SpO2: 98%   Weight: 121.1 kg (267 lb)   Height: 5' 2" (1.575 m)      Body mass index is 48.83 kg/m².    PHYSICAL EXAM:   Physical Exam  Vitals and nursing note reviewed.   Constitutional:       General: She is awake.      Appearance: Normal appearance. She is well-developed and well-groomed. She is morbidly obese.   HENT:      Head: Normocephalic and atraumatic.      Right Ear: Tympanic membrane, ear canal and external ear normal.      Left Ear: Tympanic membrane, ear canal and external ear normal.      Nose: Congestion and rhinorrhea present.      Right Nostril: No epistaxis.      Left Nostril: No epistaxis.      Right Turbinates: Swollen.      Left Turbinates: Swollen.      Right Sinus: No maxillary sinus tenderness or frontal sinus tenderness.      Left Sinus: No maxillary sinus tenderness or frontal sinus tenderness.      Mouth/Throat:      Lips: Pink.      Mouth: Mucous membranes are moist.      Pharynx: Oropharynx is clear. Uvula midline.   Eyes:      General: Lids are normal. No scleral icterus.     Extraocular Movements: Extraocular movements intact.      Conjunctiva/sclera: Conjunctivae normal.      Pupils: Pupils are equal, round, and reactive to light.   Neck:      Trachea: Trachea and phonation normal.   Cardiovascular:      Rate and Rhythm: Normal rate and regular rhythm.      Pulses: Normal pulses.           Radial pulses are 2+ on the right side and 2+ on the left side.      Heart sounds: Normal heart sounds.   Pulmonary:      Effort: Pulmonary effort is normal.      Breath sounds: Normal breath sounds and air entry.   Abdominal:      General: Abdomen is flat.      Palpations: Abdomen is soft.      Tenderness: There is no abdominal tenderness.   Genitourinary:     Comments: Denies urinary symptoms or bowel habit changes  Musculoskeletal:         " General: Normal range of motion.      Cervical back: Normal, normal range of motion and neck supple.      Thoracic back: Spasms present.      Lumbar back: Normal.        Back:       Right lower leg: No edema.      Left lower leg: No edema.   Lymphadenopathy:      Cervical: No cervical adenopathy.   Skin:     General: Skin is warm and dry.      Capillary Refill: Capillary refill takes less than 2 seconds.      Findings: No rash.   Neurological:      General: No focal deficit present.      Mental Status: She is alert and oriented to person, place, and time. Mental status is at baseline.      GCS: GCS eye subscore is 4. GCS verbal subscore is 5. GCS motor subscore is 6.      Cranial Nerves: Cranial nerves 2-12 are intact. No cranial nerve deficit.      Sensory: Sensation is intact. No sensory deficit.      Motor: Motor function is intact. No weakness.      Coordination: Coordination is intact. Coordination normal.      Gait: Gait is intact. Gait normal.   Psychiatric:         Attention and Perception: Attention and perception normal.         Mood and Affect: Mood and affect normal.         Speech: Speech normal.         Behavior: Behavior normal. Behavior is cooperative.         Thought Content: Thought content normal.         Cognition and Memory: Cognition and memory normal.         Judgment: Judgment normal.          ASSESSMENT/PLAN:  1. Type 2 diabetes mellitus with hyperglycemia, without long-term current use of insulin  Assessment & Plan:  Last hemoglobin 1 AC was completed on March 8, 2024 at 6.3% Patient currently compliant with metformin 500 mg p.o. b.i.d., Farxiga 10 mg p.o. once daily.  Advise patient to continue to follow diabetic diet meal planning.  Weight loss.  Stay hydrated with water.  Exercise.  Lipid, hemoglobin 1 AC pending  Patient to read discharge education materials.  Questions solicited and answered, patient verbalized and agreed to plan    Orders:  -     Lipid Panel  -     Hemoglobin  A1C    2. Back muscle spasm  Assessment & Plan:  Patient state she gets this spasm to mid back every once in a while especially if she has been sitting for long period of time.  Thoracic paraspinal muscle skeletal tightness noted on palpation.  Patient denies any pain at the spine.  Patient denies any urinary incontinence bowel habit changes or tingling or weakness.  Patient denies any recent fall or trauma.  Discussed with patient to start daily stretching in the morning or when it possible.  Stay hydrated with water.  Stay physically active as simple as brisk walking 30 minutes a day up to 5 days a week.  Lose weight.  Rx tizanidine 4 mg p.o. as needed for spasm or pain take at bedtime.  Precaution discussed.  Patient to return to clinic if no improvement.  Questions solicited and answered, patient verbalized and agreed to plan.    Orders:  -     tiZANidine (ZANAFLEX) 4 MG tablet; Take 1 tablet (4 mg total) by mouth nightly as needed (pain/spasms).  Dispense: 20 tablet; Refill: 1    3. Seasonal allergic rhinitis due to other allergic trigger  Assessment & Plan:  Past medical history of seasonal allergic rhinitis.  Patient currently on Xyzal 5 mg p.o. every other day.  Patient state she feels congested and requesting medication.  Patient stated the symptoms started about a week ago, denies contact with any sick individuals.  Patient denies any fever or chills or nausea or vomiting or short of breath or chest pain.  Insurance did not approve Flonase nasal spray.  Discussed initiation Afrin as needed twice a day for 3 days.  Stay hydrated with water.  Return to clinic if no improvement.  Patient verbalized understanding and agreed to plan.    Orders:  -     oxymetazoline (AFRIN, OXYMETAZOLINE,) 0.05 % nasal spray; 2 sprays by Nasal route 2 (two) times daily. for 3 days  Dispense: 15 mL; Refill: 0    4. Chronic hepatitis B virus infection  Assessment & Plan:  Chronic issue.  Keep appointment with the Infectious  Disease as directed.      5. Sickle cell trait  Assessment & Plan:  Patient has no symptoms.  Return to clinic as needed.             RESULTS:  Recent Results (from the past 1008 hour(s))   Lipid Panel    Collection Time: 09/20/24  8:42 AM   Result Value Ref Range    Cholesterol Total 124 <=200 mg/dL    HDL Cholesterol 45 35 - 60 mg/dL    Triglyceride 129 37 - 140 mg/dL    Cholesterol/HDL Ratio 3 0 - 5    Very Low Density Lipoprotein 26     LDL Cholesterol 53.00 50.00 - 140.00 mg/dL   Hemoglobin A1C    Collection Time: 09/20/24  8:42 AM   Result Value Ref Range    Hemoglobin A1c 6.6 <=7.0 %    Estimated Average Glucose 142.7 mg/dL         Follow Up:  Follow up in about 6 months (around 3/20/2025).     40 minutes of total time spent on the encounter, which includes face to face time and non-face to face time preparing to see the patient (eg, review of tests), Obtaining and/or reviewing separately obtained history, Documenting clinical information in the electronic or other health record, Independently interpreting results (not separately reported) and communicating results to the patient/family/caregiver, or Care coordination (not separately reported).      Previous medical history/lab work/radiology reviewed and considered during medical management decisions.   Medication list reviewed and medication reconciliation performed.  Patient was provided  and care about his/her current diagnosis (es) and medications including risk/benefit and side effects/adverse events, over the counter medication uses/doses, home self-care and contact precautions,  and red flags and indications for when to seek immediate medical attention.   Patient was advised to continue compliance with current medication list and medical recommendations.  Patient dvised continued compliance with recommended eating habits/ diets for medical conditions and exercise 150 minutes/ week (if possible) for medical condition (s).  Educational handouts  and instructions on selected disease management in AVS (After Visit Summary).    All of the patient's questions were answered to patient's satisfaction.   The patient was receptive, expressed verbal understanding and agreement the above plan.        This note was created with the assistance of a voice recognition software or phone dictation. There may be transcription errors as a result of using this technology however minimal. Effort has been made to assure accuracy of transcription but any obvious errors or omissions should be clarified with the author of the document

## 2024-09-20 NOTE — ASSESSMENT & PLAN NOTE
Past medical history of seasonal allergic rhinitis.  Patient currently on Xyzal 5 mg p.o. every other day.  Patient state she feels congested and requesting medication.  Patient stated the symptoms started about a week ago, denies contact with any sick individuals.  Patient denies any fever or chills or nausea or vomiting or short of breath or chest pain.  Insurance did not approve Flonase nasal spray.  Discussed initiation Afrin as needed twice a day for 3 days.  Stay hydrated with water.  Return to clinic if no improvement.  Patient verbalized understanding and agreed to plan.

## 2024-09-20 NOTE — ASSESSMENT & PLAN NOTE
Last hemoglobin 1 AC was completed on March 8, 2024 at 6.3% Patient currently compliant with metformin 500 mg p.o. b.i.d., Farxiga 10 mg p.o. once daily.  Advise patient to continue to follow diabetic diet meal planning.  Weight loss.  Stay hydrated with water.  Exercise.  Lipid, hemoglobin 1 AC pending  Patient to read discharge education materials.  Questions solicited and answered, patient verbalized and agreed to plan

## 2024-09-20 NOTE — PROGRESS NOTES
PLEASE CALL PATIENTS WITH RESULT  FINDINGS:   No significant masses, calcifications, or other findings are seen in either breast.    There has been no significant interval change.    IMPRESSION: NEGATIVE    There is no mammographic evidence of malignancy.     RECOMMENDATIONS:   A routine screening mammogram in one year in the absence of significant clinical findings in the interval is recommended (September 2025).        John saunders/amirah:9/20/2024 06:12:11        letter sent: Mammography Normal    Mammogram BI-RADS: 1 Negative

## 2024-09-20 NOTE — TELEPHONE ENCOUNTER
Called patient to give results. Patient verbalized understanding. No additional questions at this time.     ----- Message from QUINN Kilpatrick sent at 9/20/2024 12:33 PM CDT -----  PLEASE CALL PATIENTS WITH RESULT  FINDINGS:   No significant masses, calcifications, or other findings are seen in either breast.    There has been no significant interval change.     IMPRESSION: NEGATIVE     There is no mammographic evidence of malignancy.      RECOMMENDATIONS:   A routine screening mammogram in one year in the absence of significant clinical findings in the interval is recommended (September 2025).          John saunders/amirah:9/20/2024 06:12:11          letter sent: Mammography Normal    Mammogram BI-RADS: 1 Negative

## 2024-09-23 ENCOUNTER — TELEPHONE (OUTPATIENT)
Dept: FAMILY MEDICINE | Facility: CLINIC | Age: 54
End: 2024-09-23
Payer: COMMERCIAL

## 2024-09-23 NOTE — TELEPHONE ENCOUNTER
Called patient to give results. Patient verbalized understanding. No additional questions at this time.     ----- Message from QUINN Kilpatrick sent at 9/20/2024  5:06 PM CDT -----  PLEASE CALL PATIENTS WITH RESULTS,  Hemoglobin A1c within normal range, 6.6%, keep up the good work, continue medications as prescribed such as metformin 500 mg twice a day and Farxiga 10 mg p.o. daily.  Cholesterol within normal range, continue cholesterol medication rosuvastatin 10 mg p.o. nightly  Great work  Return to clinic as discussed.

## 2024-11-16 DIAGNOSIS — E11.65 TYPE 2 DIABETES MELLITUS WITH HYPERGLYCEMIA, WITHOUT LONG-TERM CURRENT USE OF INSULIN: ICD-10-CM

## 2024-11-16 DIAGNOSIS — I10 PRIMARY HYPERTENSION: ICD-10-CM

## 2024-11-17 RX ORDER — METOPROLOL TARTRATE 25 MG/1
25 TABLET, FILM COATED ORAL 2 TIMES DAILY
Qty: 180 TABLET | Refills: 3 | Status: SHIPPED | OUTPATIENT
Start: 2024-11-17

## 2024-11-17 RX ORDER — HYDROCHLOROTHIAZIDE 25 MG/1
25 TABLET ORAL
Qty: 90 TABLET | Refills: 3 | Status: SHIPPED | OUTPATIENT
Start: 2024-11-17

## 2024-11-18 RX ORDER — METFORMIN HYDROCHLORIDE 500 MG/1
500 TABLET ORAL 2 TIMES DAILY WITH MEALS
Qty: 180 TABLET | Refills: 3 | Status: SHIPPED | OUTPATIENT
Start: 2024-11-18

## 2024-11-18 RX ORDER — ROSUVASTATIN CALCIUM 10 MG/1
10 TABLET, COATED ORAL NIGHTLY
Qty: 90 TABLET | Refills: 3 | Status: SHIPPED | OUTPATIENT
Start: 2024-11-18

## 2025-01-17 ENCOUNTER — LAB VISIT (OUTPATIENT)
Dept: LAB | Facility: HOSPITAL | Age: 55
End: 2025-01-17
Attending: NURSE PRACTITIONER
Payer: COMMERCIAL

## 2025-01-17 DIAGNOSIS — N18.32 CKD STAGE G3B/A3, GFR 30-44 AND ALBUMIN CREATININE RATIO >300 MG/G: ICD-10-CM

## 2025-01-17 LAB
ALBUMIN SERPL-MCNC: 3.7 G/DL (ref 3.5–5)
ALBUMIN/GLOB SERPL: 1 RATIO (ref 1.1–2)
ALP SERPL-CCNC: 76 UNIT/L (ref 40–150)
ALT SERPL-CCNC: 12 UNIT/L (ref 0–55)
ANION GAP SERPL CALC-SCNC: 8 MEQ/L
AST SERPL-CCNC: 15 UNIT/L (ref 5–34)
BACTERIA #/AREA URNS AUTO: ABNORMAL /HPF
BASOPHILS # BLD AUTO: 0.04 X10(3)/MCL
BASOPHILS NFR BLD AUTO: 0.6 %
BILIRUB SERPL-MCNC: 0.4 MG/DL
BILIRUB UR QL STRIP.AUTO: NEGATIVE
BUN SERPL-MCNC: 26.2 MG/DL (ref 9.8–20.1)
CALCIUM SERPL-MCNC: 10.8 MG/DL (ref 8.4–10.2)
CHLORIDE SERPL-SCNC: 105 MMOL/L (ref 98–107)
CLARITY UR: CLEAR
CO2 SERPL-SCNC: 28 MMOL/L (ref 22–29)
COLOR UR AUTO: ABNORMAL
CREAT SERPL-MCNC: 2.12 MG/DL (ref 0.55–1.02)
CREAT UR-MCNC: 83 MG/DL (ref 45–106)
CREAT/UREA NIT SERPL: 12
EOSINOPHIL # BLD AUTO: 0.24 X10(3)/MCL (ref 0–0.9)
EOSINOPHIL NFR BLD AUTO: 3.4 %
ERYTHROCYTE [DISTWIDTH] IN BLOOD BY AUTOMATED COUNT: 14.7 % (ref 11.5–17)
GFR SERPLBLD CREATININE-BSD FMLA CKD-EPI: 27 ML/MIN/1.73/M2
GLOBULIN SER-MCNC: 3.8 GM/DL (ref 2.4–3.5)
GLUCOSE SERPL-MCNC: 113 MG/DL (ref 74–100)
GLUCOSE UR QL STRIP: ABNORMAL
HCT VFR BLD AUTO: 39.8 % (ref 37–47)
HGB BLD-MCNC: 13.4 G/DL (ref 12–16)
HGB UR QL STRIP: NEGATIVE
HYALINE CASTS #/AREA URNS LPF: ABNORMAL /LPF
IMM GRANULOCYTES # BLD AUTO: 0.02 X10(3)/MCL (ref 0–0.04)
IMM GRANULOCYTES NFR BLD AUTO: 0.3 %
KETONES UR QL STRIP: NEGATIVE
LEUKOCYTE ESTERASE UR QL STRIP: NEGATIVE
LYMPHOCYTES # BLD AUTO: 2.17 X10(3)/MCL (ref 0.6–4.6)
LYMPHOCYTES NFR BLD AUTO: 30.8 %
MCH RBC QN AUTO: 26.1 PG (ref 27–31)
MCHC RBC AUTO-ENTMCNC: 33.7 G/DL (ref 33–36)
MCV RBC AUTO: 77.6 FL (ref 80–94)
MONOCYTES # BLD AUTO: 0.84 X10(3)/MCL (ref 0.1–1.3)
MONOCYTES NFR BLD AUTO: 11.9 %
NEUTROPHILS # BLD AUTO: 3.73 X10(3)/MCL (ref 2.1–9.2)
NEUTROPHILS NFR BLD AUTO: 53 %
NITRITE UR QL STRIP: NEGATIVE
NRBC BLD AUTO-RTO: 0 %
PH UR STRIP: 6 [PH]
PLATELET # BLD AUTO: 251 X10(3)/MCL (ref 130–400)
PMV BLD AUTO: 10.7 FL (ref 7.4–10.4)
POTASSIUM SERPL-SCNC: 3.8 MMOL/L (ref 3.5–5.1)
PROT SERPL-MCNC: 7.5 GM/DL (ref 6.4–8.3)
PROT UR QL STRIP: ABNORMAL
PROT UR STRIP-MCNC: 46.2 MG/DL
PTH-INTACT SERPL-MCNC: 139.5 PG/ML (ref 8.7–77)
RBC # BLD AUTO: 5.13 X10(6)/MCL (ref 4.2–5.4)
RBC #/AREA URNS AUTO: ABNORMAL /HPF
SODIUM SERPL-SCNC: 141 MMOL/L (ref 136–145)
SP GR UR STRIP.AUTO: 1.01 (ref 1–1.03)
SQUAMOUS #/AREA URNS LPF: ABNORMAL /HPF
URINE PROTEIN/CREATININE RATIO (OLG): 0.6
UROBILINOGEN UR STRIP-ACNC: NORMAL
WBC # BLD AUTO: 7.04 X10(3)/MCL (ref 4.5–11.5)
WBC #/AREA URNS AUTO: ABNORMAL /HPF

## 2025-01-17 PROCEDURE — 85025 COMPLETE CBC W/AUTO DIFF WBC: CPT

## 2025-01-17 PROCEDURE — 82570 ASSAY OF URINE CREATININE: CPT

## 2025-01-17 PROCEDURE — 36415 COLL VENOUS BLD VENIPUNCTURE: CPT

## 2025-01-17 PROCEDURE — 81001 URINALYSIS AUTO W/SCOPE: CPT

## 2025-01-17 PROCEDURE — 83970 ASSAY OF PARATHORMONE: CPT

## 2025-01-17 PROCEDURE — 80053 COMPREHEN METABOLIC PANEL: CPT

## 2025-01-22 ENCOUNTER — OFFICE VISIT (OUTPATIENT)
Dept: NEPHROLOGY | Facility: CLINIC | Age: 55
End: 2025-01-22
Payer: COMMERCIAL

## 2025-01-22 DIAGNOSIS — E83.52 HYPERCALCEMIA: ICD-10-CM

## 2025-01-22 DIAGNOSIS — I10 PRIMARY HYPERTENSION: ICD-10-CM

## 2025-01-22 DIAGNOSIS — N25.81 SECONDARY HYPERPARATHYROIDISM OF RENAL ORIGIN: ICD-10-CM

## 2025-01-22 DIAGNOSIS — E11.65 TYPE 2 DIABETES MELLITUS WITH HYPERGLYCEMIA, WITHOUT LONG-TERM CURRENT USE OF INSULIN: ICD-10-CM

## 2025-01-22 DIAGNOSIS — N18.4 CKD STAGE G4/A3, GFR 15-29 AND ALBUMIN CREATININE RATIO >300 MG/G: Primary | ICD-10-CM

## 2025-01-22 DIAGNOSIS — M54.30 SCIATIC NERVE PAIN, UNSPECIFIED LATERALITY: ICD-10-CM

## 2025-01-22 PROBLEM — N18.32 CKD STAGE G3B/A3, GFR 30-44 AND ALBUMIN CREATININE RATIO >300 MG/G: Status: RESOLVED | Noted: 2022-08-10 | Resolved: 2025-01-22

## 2025-01-22 NOTE — PROGRESS NOTES
Established Patient - Audio Only Telehealth Visit     The patient location is: Home  The chief complaint leading to consultation is:  Management of chronic kidney disease  Visit type: Virtual visit with audio only (telephone)  Total time spent with patient:  30  minutes     The reason for the audio only service rather than synchronous audio and video virtual visit was related to technical difficulties or patient preference/necessity.     Each patient to whom I provide medical services by telemedicine is:  (1) informed of the relationship between the physician and patient and the respective role of any other health care provider with respect to management of the patient; and (2) notified that they may decline to receive medical services by telemedicine and may withdraw from such care at any time. Patient verbally consented to receive this service via voice-only telephone call.     This service was not originating from a related E/M service provided within the previous 7 days nor will  to an E/M service or procedure within the next 24 hours or my soonest available appointment.  Prevailing standard of care was able to be met in this audio-only visit.      [facility]  Nephrology Clinic Note    Chief complaint:  Management of chronic kidney disease     History of present illness:   Courtney Easley is a 54 y.o. Black or  female with past medical history of CKD, hypertension, sickle cell trait, anemia, left ventricular hypertrophy, IGT, hepatitis B, and morbid obesity. Patient presents for follow-up appointment in nephrology clinic today.  Complains of lower back pain, reports that muscle relaxants are not effective in relieving her symptoms.    Review of Systems  12 point review of systems conducted, negative except as stated in the history of present illness.    Allergies: Patient has No Known Allergies.     Past Medical History:  has a past medical history of  Anemia, unspecified, CKD (chronic kidney disease), HTN (hypertension), IGT (impaired glucose tolerance), Left ventricular hypertrophy, Morbid obesity, Personal history of colonic polyps, Sickle cell trait, and Type 2 diabetes mellitus with unspecified diabetic retinopathy without macular edema.    Procedure History:  has a past surgical history that includes Cystoscopy (07/19/2018); Cystourethroscopy (07/19/2018); Hysterectomy (07/19/2018); Salpingectomy (Bilateral, 07/19/2018); Tubal ligation; and Colonoscopy (04/26/2023).    Family History: family history includes Aneurysm in her sister; Cancer in her mother; Diabetes in her sister; Heart disease in her brother; Hypertension in her brother and father.    Social History:  reports that she has never smoked. She has never used smokeless tobacco. She reports current alcohol use of about 2.0 standard drinks of alcohol per week. She reports that she does not use drugs.    Home Medications:    Current Outpatient Medications:     allopurinoL (ZYLOPRIM) 100 MG tablet, TAKE 1 TABLET BY MOUTH EVERY DAY, Disp: 90 tablet, Rfl: 3    amLODIPine (NORVASC) 10 MG tablet, TAKE 1 TABLET BY MOUTH EVERY DAY, Disp: 90 tablet, Rfl: 3    aspirin (ECOTRIN) 81 MG EC tablet, TAKE 1 TABLET BY MOUTH EVERY DAY, Disp: 90 tablet, Rfl: 3    calcium carbonate (OS-TO) 600 mg calcium (1,500 mg) Tab, Take 600 mg by mouth once daily., Disp: , Rfl:     dapagliflozin propanediol (FARXIGA) 10 mg tablet, Take 1 tablet (10 mg total) by mouth once daily., Disp: 90 tablet, Rfl: 3    docusate sodium (COLACE) 50 MG capsule, Take 50 mg by mouth once daily., Disp: , Rfl:     hydrALAZINE (APRESOLINE) 25 MG tablet, TAKE 1 TABLET BY MOUTH TWICE A DAY, Disp: 180 tablet, Rfl: 3    hydroCHLOROthiazide (HYDRODIURIL) 25 MG tablet, TAKE 1 TABLET BY MOUTH EVERY DAY, Disp: 90 tablet, Rfl: 3    irbesartan (AVAPRO) 150 MG tablet, Take 1 tablet (150 mg total) by mouth every evening., Disp: 90 tablet, Rfl: 3    levocetirizine  (XYZAL) 5 MG tablet, Take 5 mg by mouth every other day., Disp: , Rfl:     metFORMIN (GLUCOPHAGE) 500 MG tablet, TAKE 1 TABLET BY MOUTH TWICE A DAY WITH MEALS, Disp: 180 tablet, Rfl: 3    metoprolol tartrate (LOPRESSOR) 25 MG tablet, TAKE 1 TABLET BY MOUTH TWICE A DAY, Disp: 180 tablet, Rfl: 3    rosuvastatin (CRESTOR) 10 MG tablet, TAKE 1 TABLET BY MOUTH EVERY DAY IN THE EVENING, Disp: 90 tablet, Rfl: 3    tiZANidine (ZANAFLEX) 4 MG tablet, Take 1 tablet (4 mg total) by mouth nightly as needed (pain/spasms)., Disp: 20 tablet, Rfl: 1    vitamin D (VITAMIN D3) 1000 units Tab, Take 1,000 Units by mouth once daily., Disp: , Rfl:     Laboratory data    Lab Results   Component Value Date    WBC 7.04 01/17/2025    HGB 13.4 01/17/2025    HCT 39.8 01/17/2025     01/17/2025     01/17/2025    K 3.8 01/17/2025    CO2 28 01/17/2025    BUN 26.2 (H) 01/17/2025    CREATININE 2.12 (H) 01/17/2025    EGFRNORACEVR 27 01/17/2025    GLUCOSE 113 (H) 01/17/2025    CALCIUM 10.8 (H) 01/17/2025    ALKPHOS 76 01/17/2025    LABPROT 7.5 01/17/2025    ALBUMIN 3.7 01/17/2025    BILIDIR 0.2 02/04/2022    IBILI 0.40 02/04/2022    AST 15 01/17/2025    ALT 12 01/17/2025    PHOS 2.6 07/16/2024      Lab Results   Component Value Date    HGBA1C 6.6 09/20/2024    .5 (H) 01/17/2025    HIV Nonreactive 02/19/2021    HEPCAB Nonreactive 02/19/2021    HEPBSAB Nonreactive 11/04/2021    HEPBCAB Reactive (A) 02/19/2021    MSPIKEPCT 3.51 06/08/2021     Urine:  Lab Results   Component Value Date    APPEARANCEUA Clear 01/17/2025    SGUA 1.012 01/17/2025    PROTEINUA 1+ (A) 01/17/2025    KETONESUA Negative 01/17/2025    LEUKOCYTESUR Negative 01/17/2025    RBCUA 0-5 01/17/2025    WBCUA 0-5 01/17/2025    BACTERIA None Seen 01/17/2025    SQEPUA Trace (A) 01/17/2025    HYALINECASTS None Seen 01/17/2025    CREATRANDUR 83.0 01/17/2025    PROTEINURINE 46.2 01/17/2025    UPROTCREA 0.6 01/17/2025         Imaging  US Retroperitoneal Limited 11/30/2020  The  right kidney measures 9.8 x 5.4 x 3.9 cm and is unremarkable.  There is no right hydronephrosis.  The left kidney measures 9.9 x 5.1 x 5.0 cm and demonstrates a 9 mm  simple cyst. There is no left hydronephrosis. The left kidney is  otherwise unremarkable.  IMPRESSION:  1. 9 mm simple left renal cyst.  2. Otherwise unremarkable exam.  Electronically Signed By: Kali Juárez MD  Date/Time Signed: 11/30/2020 12:13      Impression    ICD-10-CM ICD-9-CM   1. CKD stage G4/A3, GFR 15-29 and albumin creatinine ratio >300 mg/g  N18.4 585.4   2. Primary hypertension  I10 401.9   3. Type 2 diabetes mellitus with hyperglycemia, without long-term current use of insulin  E11.65 250.00     790.29   4. Secondary hyperparathyroidism of renal origin  N25.81 588.81   5. Hypercalcemia  E83.52 275.42   6. Sciatic nerve pain, unspecified laterality  M54.30 724.3        Plan  CKD stage G4/A3, GFR 15-29 and albumin creatinine ratio >300 mg/g  -     Comprehensive Metabolic Panel; Future; Expected date: 04/10/2025  -     Urinalysis, Reflex to Urine Culture; Future; Expected date: 04/10/2025  -     Protein/Creatinine Ratio, Urine; Future; Expected date: 04/10/2025  Renal indices are stable. Proteinuria is in the non-nephrotic range. Proteinuria evaluation revealed negative SIMRAN, negative ANCA, negative HIV, negative hepatitis C antibody, positive hepatitis B surface antigen, and SPEP positive for M spike. Imaging of the kidneys was unremarkable.   Skeletal survey on 11/23/2021 revealed no suspicious bony lesions. Patient has established care in ID clinic for management of chronic inactive hepatitis B, currently not on treatment.  Patient has been evaluated by hematology clinic for MGUS, no treatment was recommended.    MGRS is less likely due to stable serum creatinine levels and stable urinary protein excretion.  Continue risk factor management, MAKENZIE blockade, SGLT2i  and periodic monitoring.      Primary hypertension  Blood pressure  reading is at goal, continue current antihypertensive regimen and 2 g a day dietary sodium restriction.      Type 2 diabetes mellitus with hyperglycemia, without long-term current use of insulin  Continue SGLT2 inhibitor and angiotensin receptor blocker.      Secondary hyperparathyroidism of renal origin  -     PTH, Intact; Future; Expected date: 04/10/2025  -     Phosphorus; Future; Expected date: 04/10/2025  There are no indications for active vitamin-D analogs at this time.  Will continue to monitor intact PTH, calcium, and phosphorus levels periodically.      Hypercalcemia  -     Vitamin D; Future; Expected date: 04/10/2025  Will check vitamin-D level prior to next visit.  Continue to monitor intact PTH levels.      Sciatic nerve pain, unspecified laterality  -     Ambulatory referral/consult to Home Health; Future; Expected date: 01/23/2025  Will refer to home health for physical therapy.      Follow up in about 3 months (around 4/22/2025).       Ketty Dumas NP  [facility]

## 2025-01-31 ENCOUNTER — TELEPHONE (OUTPATIENT)
Dept: NEPHROLOGY | Facility: CLINIC | Age: 55
End: 2025-01-31
Payer: COMMERCIAL

## 2025-02-12 DIAGNOSIS — I10 PRIMARY HYPERTENSION: ICD-10-CM

## 2025-02-12 RX ORDER — AMLODIPINE BESYLATE 10 MG/1
10 TABLET ORAL
Qty: 90 TABLET | Refills: 3 | Status: SHIPPED | OUTPATIENT
Start: 2025-02-12

## 2025-02-12 RX ORDER — HYDRALAZINE HYDROCHLORIDE 25 MG/1
25 TABLET, FILM COATED ORAL 2 TIMES DAILY
Qty: 180 TABLET | Refills: 3 | Status: SHIPPED | OUTPATIENT
Start: 2025-02-12

## 2025-04-08 ENCOUNTER — LAB VISIT (OUTPATIENT)
Dept: LAB | Facility: HOSPITAL | Age: 55
End: 2025-04-08
Attending: NURSE PRACTITIONER
Payer: COMMERCIAL

## 2025-04-08 DIAGNOSIS — E83.52 HYPERCALCEMIA: ICD-10-CM

## 2025-04-08 DIAGNOSIS — N25.81 SECONDARY HYPERPARATHYROIDISM OF RENAL ORIGIN: ICD-10-CM

## 2025-04-08 DIAGNOSIS — N18.4 CKD STAGE G4/A3, GFR 15-29 AND ALBUMIN CREATININE RATIO >300 MG/G: ICD-10-CM

## 2025-04-08 LAB
25(OH)D3+25(OH)D2 SERPL-MCNC: 50 NG/ML (ref 30–80)
ALBUMIN SERPL-MCNC: 3.5 G/DL (ref 3.5–5)
ALBUMIN/GLOB SERPL: 0.9 RATIO (ref 1.1–2)
ALP SERPL-CCNC: 71 UNIT/L (ref 40–150)
ALT SERPL-CCNC: 12 UNIT/L (ref 0–55)
ANION GAP SERPL CALC-SCNC: 5 MEQ/L
AST SERPL-CCNC: 14 UNIT/L (ref 11–45)
BACTERIA #/AREA URNS AUTO: ABNORMAL /HPF
BILIRUB SERPL-MCNC: 0.4 MG/DL
BILIRUB UR QL STRIP.AUTO: NEGATIVE
BUN SERPL-MCNC: 20.1 MG/DL (ref 9.8–20.1)
CALCIUM SERPL-MCNC: 10 MG/DL (ref 8.4–10.2)
CHLORIDE SERPL-SCNC: 106 MMOL/L (ref 98–107)
CLARITY UR: CLEAR
CO2 SERPL-SCNC: 29 MMOL/L (ref 22–29)
COLOR UR AUTO: YELLOW
CREAT SERPL-MCNC: 1.84 MG/DL (ref 0.55–1.02)
CREAT UR-MCNC: 66.1 MG/DL (ref 45–106)
CREAT/UREA NIT SERPL: 11
GFR SERPLBLD CREATININE-BSD FMLA CKD-EPI: 32 ML/MIN/1.73/M2
GLOBULIN SER-MCNC: 3.9 GM/DL (ref 2.4–3.5)
GLUCOSE SERPL-MCNC: 121 MG/DL (ref 74–100)
GLUCOSE UR QL STRIP: ABNORMAL
HGB UR QL STRIP: NEGATIVE
HYALINE CASTS #/AREA URNS LPF: ABNORMAL /LPF
KETONES UR QL STRIP: NEGATIVE
LEUKOCYTE ESTERASE UR QL STRIP: NEGATIVE
NITRITE UR QL STRIP: NEGATIVE
PH UR STRIP: 6.5 [PH]
PHOSPHATE SERPL-MCNC: 3.4 MG/DL (ref 2.3–4.7)
POTASSIUM SERPL-SCNC: 3.8 MMOL/L (ref 3.5–5.1)
PROT SERPL-MCNC: 7.4 GM/DL (ref 6.4–8.3)
PROT UR QL STRIP: ABNORMAL
PROT UR STRIP-MCNC: 55.3 MG/DL
PTH-INTACT SERPL-MCNC: 111 PG/ML (ref 8.7–77)
RBC #/AREA URNS AUTO: ABNORMAL /HPF
SODIUM SERPL-SCNC: 140 MMOL/L (ref 136–145)
SP GR UR STRIP.AUTO: 1.01 (ref 1–1.03)
SQUAMOUS #/AREA URNS LPF: ABNORMAL /HPF
URINE PROTEIN/CREATININE RATIO (OLG): 0.8
UROBILINOGEN UR STRIP-ACNC: NORMAL
WBC #/AREA URNS AUTO: ABNORMAL /HPF

## 2025-04-08 PROCEDURE — 83970 ASSAY OF PARATHORMONE: CPT

## 2025-04-08 PROCEDURE — 80053 COMPREHEN METABOLIC PANEL: CPT

## 2025-04-08 PROCEDURE — 84156 ASSAY OF PROTEIN URINE: CPT

## 2025-04-08 PROCEDURE — 82306 VITAMIN D 25 HYDROXY: CPT

## 2025-04-08 PROCEDURE — 84100 ASSAY OF PHOSPHORUS: CPT

## 2025-04-08 PROCEDURE — 81015 MICROSCOPIC EXAM OF URINE: CPT

## 2025-04-08 PROCEDURE — 36415 COLL VENOUS BLD VENIPUNCTURE: CPT

## 2025-04-10 ENCOUNTER — RESULTS FOLLOW-UP (OUTPATIENT)
Dept: INTERNAL MEDICINE | Facility: CLINIC | Age: 55
End: 2025-04-10

## 2025-04-10 ENCOUNTER — LAB VISIT (OUTPATIENT)
Dept: LAB | Facility: HOSPITAL | Age: 55
End: 2025-04-10
Attending: NURSE PRACTITIONER
Payer: COMMERCIAL

## 2025-04-10 ENCOUNTER — OFFICE VISIT (OUTPATIENT)
Dept: INTERNAL MEDICINE | Facility: CLINIC | Age: 55
End: 2025-04-10
Payer: COMMERCIAL

## 2025-04-10 ENCOUNTER — OFFICE VISIT (OUTPATIENT)
Dept: NEPHROLOGY | Facility: CLINIC | Age: 55
End: 2025-04-10
Payer: COMMERCIAL

## 2025-04-10 ENCOUNTER — TELEPHONE (OUTPATIENT)
Dept: INTERNAL MEDICINE | Facility: CLINIC | Age: 55
End: 2025-04-10

## 2025-04-10 VITALS
OXYGEN SATURATION: 98 % | BODY MASS INDEX: 48.4 KG/M2 | HEIGHT: 62 IN | SYSTOLIC BLOOD PRESSURE: 135 MMHG | HEART RATE: 78 BPM | DIASTOLIC BLOOD PRESSURE: 88 MMHG | BODY MASS INDEX: 48.4 KG/M2 | SYSTOLIC BLOOD PRESSURE: 135 MMHG | HEIGHT: 62 IN | WEIGHT: 263 LBS | DIASTOLIC BLOOD PRESSURE: 88 MMHG | RESPIRATION RATE: 18 BRPM | TEMPERATURE: 98 F | WEIGHT: 263 LBS | HEART RATE: 78 BPM | TEMPERATURE: 98 F | RESPIRATION RATE: 20 BRPM

## 2025-04-10 DIAGNOSIS — Z12.31 BREAST CANCER SCREENING BY MAMMOGRAM: ICD-10-CM

## 2025-04-10 DIAGNOSIS — E11.65 TYPE 2 DIABETES MELLITUS WITH HYPERGLYCEMIA, WITHOUT LONG-TERM CURRENT USE OF INSULIN: ICD-10-CM

## 2025-04-10 DIAGNOSIS — N18.32 CKD STAGE G3B/A3, GFR 30-44 AND ALBUMIN CREATININE RATIO >300 MG/G: Primary | ICD-10-CM

## 2025-04-10 DIAGNOSIS — N25.81 SECONDARY HYPERPARATHYROIDISM OF RENAL ORIGIN: ICD-10-CM

## 2025-04-10 DIAGNOSIS — M1A.3520: ICD-10-CM

## 2025-04-10 DIAGNOSIS — E66.01 SEVERE OBESITY (BMI >= 40): ICD-10-CM

## 2025-04-10 DIAGNOSIS — N18.4 STAGE 4 CHRONIC KIDNEY DISEASE: ICD-10-CM

## 2025-04-10 DIAGNOSIS — I10 PRIMARY HYPERTENSION: ICD-10-CM

## 2025-04-10 DIAGNOSIS — B18.1 CHRONIC HEPATITIS B VIRUS INFECTION: ICD-10-CM

## 2025-04-10 DIAGNOSIS — M62.830 BACK MUSCLE SPASM: ICD-10-CM

## 2025-04-10 DIAGNOSIS — E11.65 TYPE 2 DIABETES MELLITUS WITH HYPERGLYCEMIA, WITHOUT LONG-TERM CURRENT USE OF INSULIN: Primary | ICD-10-CM

## 2025-04-10 PROBLEM — Z76.89 ENCOUNTER TO ESTABLISH CARE: Status: RESOLVED | Noted: 2023-03-06 | Resolved: 2025-04-10

## 2025-04-10 PROBLEM — M10.9 GOUT: Status: RESOLVED | Noted: 2023-03-06 | Resolved: 2025-04-10

## 2025-04-10 LAB
EST. AVERAGE GLUCOSE BLD GHB EST-MCNC: 139.9 MG/DL
HBA1C MFR BLD: 6.5 %

## 2025-04-10 PROCEDURE — 3008F BODY MASS INDEX DOCD: CPT | Mod: CPTII,,, | Performed by: NURSE PRACTITIONER

## 2025-04-10 PROCEDURE — 3079F DIAST BP 80-89 MM HG: CPT | Mod: CPTII,,, | Performed by: NURSE PRACTITIONER

## 2025-04-10 PROCEDURE — 3066F NEPHROPATHY DOC TX: CPT | Mod: CPTII,,, | Performed by: NURSE PRACTITIONER

## 2025-04-10 PROCEDURE — 83036 HEMOGLOBIN GLYCOSYLATED A1C: CPT

## 2025-04-10 PROCEDURE — 99215 OFFICE O/P EST HI 40 MIN: CPT | Mod: PBBFAC,27 | Performed by: NURSE PRACTITIONER

## 2025-04-10 PROCEDURE — 1160F RVW MEDS BY RX/DR IN RCRD: CPT | Mod: CPTII,,, | Performed by: NURSE PRACTITIONER

## 2025-04-10 PROCEDURE — 4010F ACE/ARB THERAPY RXD/TAKEN: CPT | Mod: CPTII,,, | Performed by: NURSE PRACTITIONER

## 2025-04-10 PROCEDURE — 3075F SYST BP GE 130 - 139MM HG: CPT | Mod: CPTII,,, | Performed by: NURSE PRACTITIONER

## 2025-04-10 PROCEDURE — 1159F MED LIST DOCD IN RCRD: CPT | Mod: CPTII,,, | Performed by: NURSE PRACTITIONER

## 2025-04-10 PROCEDURE — 99214 OFFICE O/P EST MOD 30 MIN: CPT | Mod: S$PBB,,, | Performed by: NURSE PRACTITIONER

## 2025-04-10 PROCEDURE — 36415 COLL VENOUS BLD VENIPUNCTURE: CPT

## 2025-04-10 PROCEDURE — 99214 OFFICE O/P EST MOD 30 MIN: CPT | Mod: PBBFAC | Performed by: NURSE PRACTITIONER

## 2025-04-10 RX ORDER — METFORMIN HYDROCHLORIDE 500 MG/1
500 TABLET ORAL 2 TIMES DAILY WITH MEALS
Qty: 180 TABLET | Refills: 2 | Status: SHIPPED | OUTPATIENT
Start: 2025-04-10 | End: 2026-01-05

## 2025-04-10 RX ORDER — DAPAGLIFLOZIN 10 MG/1
10 TABLET, FILM COATED ORAL DAILY
Qty: 90 TABLET | Refills: 2 | Status: SHIPPED | OUTPATIENT
Start: 2025-04-10 | End: 2025-04-10

## 2025-04-10 RX ORDER — ROSUVASTATIN CALCIUM 10 MG/1
10 TABLET, COATED ORAL NIGHTLY
Qty: 90 TABLET | Refills: 4 | Status: SHIPPED | OUTPATIENT
Start: 2025-04-10 | End: 2026-07-04

## 2025-04-10 RX ORDER — TIZANIDINE 4 MG/1
4 TABLET ORAL NIGHTLY PRN
Qty: 60 TABLET | Refills: 6 | Status: SHIPPED | OUTPATIENT
Start: 2025-04-10

## 2025-04-10 RX ORDER — LOSARTAN POTASSIUM AND HYDROCHLOROTHIAZIDE 25; 100 MG/1; MG/1
1 TABLET ORAL DAILY
Qty: 90 TABLET | Refills: 3 | Status: SHIPPED | OUTPATIENT
Start: 2025-04-10 | End: 2026-04-10

## 2025-04-10 NOTE — ASSESSMENT & PLAN NOTE
BP at goal  Continue meds as directed  Hctz 25 mg daily, hydralazine 25 mg BID, irbesartan 150 mg qhs,   & amlodipine 10 mg daily  BP: 135/88  Low Sodium Diet (Dash Diet - less than 2 grams of sodium per day).  Maintain healthy weight with goal BMI <30. Exercise 30 minutes per day 5 days per week.

## 2025-04-10 NOTE — TELEPHONE ENCOUNTER
Please inform the patient that her insurance no longer coverers Farxiga so I have changed it to Jardiance which is in the same family. She can finished the Farxiga that she currently has however.   Thanks,    QUINN Mitchell

## 2025-04-10 NOTE — PROGRESS NOTES
"[facility]  Nephrology Clinic Note    Chief complaint: Chronic Kidney Disease (Follow up)    History of present illness:   Courtney Easley is a 54 y.o. Black or  female with past medical history of CKD, hypertension, sickle cell trait, anemia, left ventricular hypertrophy, IGT, hepatitis B, and morbid obesity. Patient presents for follow-up appointment in nephrology clinic today.  Denies complaints.  She is interested in reducing the number of medications that she is taking every day.    Review of Systems  12 point review of systems conducted, negative except as stated in the history of present illness.    Allergies: Patient has no known allergies.     Past Medical History:  has a past medical history of Anemia, unspecified, CKD (chronic kidney disease), HTN (hypertension), IGT (impaired glucose tolerance), Left ventricular hypertrophy, Morbid obesity, Personal history of colonic polyps, Sickle cell trait, and Type 2 diabetes mellitus with unspecified diabetic retinopathy without macular edema.    Procedure History:  has a past surgical history that includes Cystoscopy (07/19/2018); Cystourethroscopy (07/19/2018); Hysterectomy (07/19/2018); Salpingectomy (Bilateral, 07/19/2018); Tubal ligation; and Colonoscopy (04/26/2023).    Family History: family history includes Aneurysm in her sister; Cancer in her mother; Diabetes in her sister; Heart disease in her brother; Hypertension in her brother and father.    Social History:  reports that she has never smoked. She has never used smokeless tobacco. She reports current alcohol use of about 2.0 standard drinks of alcohol per week. She reports that she does not use drugs.    Physical exam  /88 (BP Location: Right arm, Patient Position: Sitting)   Pulse 78   Temp 98.1 °F (36.7 °C) (Oral)   Resp 20   Ht 5' 2" (1.575 m)   Wt 119.3 kg (263 lb 0.1 oz)   SpO2 98%   BMI 48.10 kg/m²   General appearance: Patient is " in no acute distress.  Skin: No rashes or wounds.  HEENT: PERRLA, EOMI, no scleral icterus, no JVD. Neck is supple.  Chest: Respirations are unlabored. Lungs sounds are clear.   Heart: S1, S2.   Abdomen: Benign.  Obese.  : Deferred.  Extremities: No edema, peripheral pulses are palpable.   Neuro: No focal deficits.     Home Medications:  Current Medications[1]    Laboratory data    Lab Results   Component Value Date    WBC 7.04 01/17/2025    HGB 13.4 01/17/2025    HCT 39.8 01/17/2025     01/17/2025     04/08/2025    K 3.8 04/08/2025    CO2 29 04/08/2025    BUN 20.1 04/08/2025    CREATININE 1.84 (H) 04/08/2025    EGFRNORACEVR 32 04/08/2025    GLUCOSE 121 (H) 04/08/2025    CALCIUM 10.0 04/08/2025    ALKPHOS 71 04/08/2025    LABPROT 7.4 04/08/2025    ALBUMIN 3.5 04/08/2025    BILIDIR 0.2 02/04/2022    IBILI 0.40 02/04/2022    AST 14 04/08/2025    ALT 12 04/08/2025    PHOS 3.4 04/08/2025      Lab Results   Component Value Date    HGBA1C 6.6 09/20/2024    .0 (H) 04/08/2025    [id number] 50 04/08/2025    HIV Nonreactive 02/19/2021    HEPCAB Nonreactive 02/19/2021    HEPBSAB Nonreactive 11/04/2021    HEPBCAB Reactive (A) 02/19/2021    MSPIKEPCT 3.51 06/08/2021     Urine:  Lab Results   Component Value Date    APPEARANCEUA Clear 04/08/2025    SGUA 1.010 04/08/2025    PROTEINUA 1+ (A) 04/08/2025    KETONESUA Negative 04/08/2025    LEUKOCYTESUR Negative 04/08/2025    RBCUA 0-5 04/08/2025    WBCUA 0-5 04/08/2025    BACTERIA None Seen 04/08/2025    SQEPUA Trace (A) 04/08/2025    HYALINECASTS None Seen 04/08/2025    CREATRANDUR 66.1 04/08/2025    PROTEINURINE 55.3 04/08/2025    UPROTCREA 0.8 04/08/2025         Imaging  Kidney US  The right kidney measures 9.8 x 5.4 x 3.9 cm and is unremarkable.  There is no right hydronephrosis.   The left kidney measures 9.9 x 5.1 x 5.0 cm and demonstrates a 9 mm  simple cyst. There is no left hydronephrosis. The left kidney is  otherwise unremarkable.     IMPRESSION:     1. 9 mm simple left renal cyst.   2. Otherwise unremarkable exam.    Impression    ICD-10-CM ICD-9-CM   1. CKD stage G3b/A3, GFR 30-44 and albumin creatinine ratio >300 mg/g  N18.32 585.3   2. Primary hypertension  I10 401.9   3. Type 2 diabetes mellitus with hyperglycemia, without long-term current use of insulin  E11.65 250.00     790.29   4. Secondary hyperparathyroidism of renal origin  N25.81 588.81   5. Severe obesity (BMI >= 40)  E66.01 278.01        Plan  CKD stage G3b/A3, GFR 30-44 and albumin creatinine ratio >300 mg/g  -     Comprehensive Metabolic Panel; Future; Expected date: 07/20/2025  -     PTH, Intact; Future; Expected date: 07/20/2025  -     Microalbumin/Creatinine Ratio, Urine; Future; Expected date: 07/20/2025  -     Uric Acid; Future; Expected date: 07/20/2025  -     Urinalysis, Reflex to Urine Culture; Future; Expected date: 07/20/2025  Renal indices are stable. Proteinuria is in the non-nephrotic range. Proteinuria evaluation revealed negative SIMRAN, negative ANCA, negative HIV, negative hepatitis C antibody, positive hepatitis B surface antigen, and SPEP positive for M spike. Imaging of the kidneys was unremarkable.   Skeletal survey on 11/23/2021 revealed no suspicious bony lesions. Patient has established care in ID clinic for management of chronic inactive hepatitis B, currently not on treatment.  Patient has been evaluated by hematology clinic for MGUS, no treatment was recommended.    MGRS is less likely due to stable serum creatinine levels and stable urinary protein excretion.  Continue risk factor management, MAKENZIE blockade, SGLT2i  and periodic monitoring.      Primary hypertension  -     losartan-hydrochlorothiazide 100-25 mg (HYZAAR) 100-25 mg per tablet; Take 1 tablet by mouth once daily.  Dispense: 90 tablet; Refill: 3  Will discontinue irbesartan and hydrochlorothiazide and given the patient a combination ARB-thiazide.  Will also hold hydralazine for now.  Continue home blood  pressure monitoring, will follow up with blood pressure log in 2-3 weeks by phone.    Type 2 diabetes mellitus with hyperglycemia, without long-term current use of insulin  Continue SGLT2 inhibitor and angiotensin receptor blocker.      Secondary hyperparathyroidism of renal origin  There are no indications for active vitamin-D analogs at this time.  Will continue to monitor intact PTH, calcium, and phosphorus levels periodically.      Severe obesity (BMI >= 40)  Lifestyle and dietary interventions discussed, patient encouraged to maintain non-sedentary lifestyle and well-balanced diet.     Follow up in about 4 months (around 8/10/2025).        Ketty Dumas NP  [facility]            [1]   Current Outpatient Medications:     allopurinoL (ZYLOPRIM) 100 MG tablet, TAKE 1 TABLET BY MOUTH EVERY DAY, Disp: 90 tablet, Rfl: 3    amLODIPine (NORVASC) 10 MG tablet, TAKE 1 TABLET BY MOUTH EVERY DAY, Disp: 90 tablet, Rfl: 3    aspirin (ECOTRIN) 81 MG EC tablet, TAKE 1 TABLET BY MOUTH EVERY DAY, Disp: 90 tablet, Rfl: 3    calcium carbonate (OS-TO) 600 mg calcium (1,500 mg) Tab, Take 600 mg by mouth once daily., Disp: , Rfl:     dapagliflozin propanediol (FARXIGA) 10 mg tablet, Take 1 tablet (10 mg total) by mouth once daily. For Diabetes, Disp: 90 tablet, Rfl: 2    docusate sodium (COLACE) 50 MG capsule, Take 50 mg by mouth once daily., Disp: , Rfl:     levocetirizine (XYZAL) 5 MG tablet, Take 5 mg by mouth every other day., Disp: , Rfl:     metFORMIN (GLUCOPHAGE) 500 MG tablet, Take 1 tablet (500 mg total) by mouth 2 (two) times daily with meals. For Diabetes, Disp: 180 tablet, Rfl: 2    metoprolol tartrate (LOPRESSOR) 25 MG tablet, TAKE 1 TABLET BY MOUTH TWICE A DAY, Disp: 180 tablet, Rfl: 3    rosuvastatin (CRESTOR) 10 MG tablet, Take 1 tablet (10 mg total) by mouth every evening. For Cholesterol, Disp: 90 tablet, Rfl: 4    tiZANidine (ZANAFLEX) 4 MG tablet, Take 1 tablet (4 mg total) by mouth nightly as needed  (pain/spasms)., Disp: 60 tablet, Rfl: 6    vitamin D (VITAMIN D3) 1000 units Tab, Take 1,000 Units by mouth once daily., Disp: , Rfl:     losartan-hydrochlorothiazide 100-25 mg (HYZAAR) 100-25 mg per tablet, Take 1 tablet by mouth once daily., Disp: 90 tablet, Rfl: 3

## 2025-04-10 NOTE — ASSESSMENT & PLAN NOTE
Stable  Continue RX tinazadine   Perform back stretches daily.   Avoid activities than increase back pain or stiffness.   Apply heating pad, ice pack, and Icy Hot / BioFreeze as needed; alternate every 15-20 minutes.   Massage back to loosen muscles.

## 2025-04-10 NOTE — PROGRESS NOTES
Can let pt know her A1C from today for her diabetes was 6.5 which is at goal and perfect.   Thanks,    JAIDA MitchellP

## 2025-04-10 NOTE — ASSESSMENT & PLAN NOTE
Continued f/u with Renal   Follow renoprotective measures including Renal Diet (reduce intake of nuts, peanut butter, milk, cheese, dried beans, peas), low protein, and Low Sodium Diet (less than 2 grams per day).  Avoid NSAIDs (Aleve, Mobic, Celebrex, Ibuprofen, Advil, Toradol and Diclofenac). May take Tylenol as needed for headache/pain.  If Applicable: Control DM with goal A1C <7. BP goal <130/80. LDL goal < 100.  Stay well hydrated. Avoid alcohol and soda. Limit tea and coffee.  Smoking Cessation recommended.    Lab Results   Component Value Date    EGFRNORACEVR 32 04/08/2025     Lab Results   Component Value Date    BUN 20.1 04/08/2025     Lab Results   Component Value Date    CREATININE 1.84 (H) 04/08/2025

## 2025-04-10 NOTE — TELEPHONE ENCOUNTER
----- Message from QUINN Mitchell sent at 4/10/2025  9:54 AM CDT -----  Can let pt know her A1C from today for her diabetes was 6.5 which is at goal and perfect.   Thanks,    QUINN Mitchell    ----- Message -----  From: Lab, Background User  Sent: 4/10/2025   9:08 AM CDT  To: QUINN Sol

## 2025-04-10 NOTE — ASSESSMENT & PLAN NOTE
A1C today   Continue RX metformin 500 mg BID, Farxiga 10 mg daily  Diabetes Medications              dapagliflozin propanediol (FARXIGA) 10 mg tablet Take 1 tablet (10 mg total) by mouth once daily. For Diabetes    metFORMIN (GLUCOPHAGE) 500 MG tablet Take 1 tablet (500 mg total) by mouth 2 (two) times daily with meals. For Diabetes          Follow ADA diet.  Avoid soda, simple sweets, and limit rice/pasta/bread/starches and consume brown options when possible.   Maintain healthy weight with BMI goal <30.   Perform aerobic exercise for 150 minutes per week (or 5 days a week for 30 minutes each day).   Examine feet daily.     Lab Results   Component Value Date    HGBA1C 6.6 09/20/2024

## 2025-04-10 NOTE — ASSESSMENT & PLAN NOTE
Stable  Continue RX allopurinol 100 mg daily  Lab Results   Component Value Date    LABURIC 7.2 (H) 03/08/2024     Stay well hydrated by increasing water intake throughout the day.  Stressed importance of exercise and weight loss to maintain BMI <30.  Avoid alcohol, sodas, organ/glandular meats (liver, kidney, sweetbreads). Limit seafood and red meat intake.  Eat a balanced diet of fruits, vegetables, complex carbohydrates, and lean sources of protein (boneless/skinless chicken breasts, salmon, lentils, low fat dairy).  Discussed possible benefit of OTC Vitamin C supplementation.

## 2025-04-10 NOTE — PROGRESS NOTES
QUINN Sol   [facility]  [address] [facility] [address]      PATIENT NAME: Courtney Easley  : 1970  DATE: 4/10/25  MRN: [id number]      Reason for Visit / Chief Complaint: Hypertension and Establish Care       History of Present Illness / Problem Focused Workflow     Courtney Easley presents to the clinic with Hypertension and Establish Care     55 yo AAF here to establish care. Medical problems includes T2DM, CKD, HTN, sickle cell trait, anemia, left ventricular hypertrophy, IGT, hepatitis B, and morbid obesity.   Followed by [facility].     Cervical Cancer Screening:  Breast Cancer Screenin2025  Colon Cancer Screenin2023 Colonoscopy, 3 yr f/u   Osteoporosis Screening: Vitamin D Level   Diabetic Screenin2025  STD Screening:   Eye Screening:  Dental Screening:   Wellness Screening:             Review of Systems     Review of Systems      Medications and Allergies     Medications  Medication List with Changes/Refills   Current Medications    ALLOPURINOL (ZYLOPRIM) 100 MG TABLET    TAKE 1 TABLET BY MOUTH EVERY DAY    AMLODIPINE (NORVASC) 10 MG TABLET    TAKE 1 TABLET BY MOUTH EVERY DAY    ASPIRIN (ECOTRIN) 81 MG EC TABLET    TAKE 1 TABLET BY MOUTH EVERY DAY    CALCIUM CARBONATE (OS-TO) 600 MG CALCIUM (1,500 MG) TAB    Take 600 mg by mouth once daily.    DOCUSATE SODIUM (COLACE) 50 MG CAPSULE    Take 50 mg by mouth once daily.    HYDRALAZINE (APRESOLINE) 25 MG TABLET    TAKE 1 TABLET BY MOUTH TWICE A DAY    HYDROCHLOROTHIAZIDE (HYDRODIURIL) 25 MG TABLET    TAKE 1 TABLET BY MOUTH EVERY DAY    IRBESARTAN (AVAPRO) 150 MG TABLET    Take 1 tablet (150 mg total) by mouth every evening.    LEVOCETIRIZINE (XYZAL) 5 MG TABLET    Take 5 mg by mouth every other day.    METOPROLOL TARTRATE (LOPRESSOR) 25 MG TABLET    TAKE 1 TABLET BY MOUTH TWICE A DAY    VITAMIN D (VITAMIN D3) 1000 UNITS TAB    Take  1,000 Units by mouth once daily.   Changed and/or Refilled Medications    Modified Medication Previous Medication    DAPAGLIFLOZIN PROPANEDIOL (FARXIGA) 10 MG TABLET dapagliflozin propanediol (FARXIGA) 10 mg tablet       Take 1 tablet (10 mg total) by mouth once daily. For Diabetes    Take 1 tablet (10 mg total) by mouth once daily.    METFORMIN (GLUCOPHAGE) 500 MG TABLET metFORMIN (GLUCOPHAGE) 500 MG tablet       Take 1 tablet (500 mg total) by mouth 2 (two) times daily with meals. For Diabetes    TAKE 1 TABLET BY MOUTH TWICE A DAY WITH MEALS    ROSUVASTATIN (CRESTOR) 10 MG TABLET rosuvastatin (CRESTOR) 10 MG tablet       Take 1 tablet (10 mg total) by mouth every evening. For Cholesterol    TAKE 1 TABLET BY MOUTH EVERY DAY IN THE EVENING    TIZANIDINE (ZANAFLEX) 4 MG TABLET tiZANidine (ZANAFLEX) 4 MG tablet       Take 1 tablet (4 mg total) by mouth nightly as needed (pain/spasms).    Take 1 tablet (4 mg total) by mouth nightly as needed (pain/spasms).         Allergies  Review of patient's allergies indicates:  No Known Allergies    Physical Examination     Vitals:    04/10/25 0807   BP: 135/88   Pulse: 78   Resp: 18   Temp: 98.1 °F (36.7 °C)     Physical Exam      Results     Lab Results   Component Value Date    WBC 7.04 01/17/2025    RBC 5.13 01/17/2025    HGB 13.4 01/17/2025    HCT 39.8 01/17/2025    MCV 77.6 (L) 01/17/2025    MCH 26.1 (L) 01/17/2025    MCHC 33.7 01/17/2025    RDW 14.7 01/17/2025     01/17/2025    MPV 10.7 (H) 01/17/2025     Sodium   Date Value Ref Range Status   04/08/2025 140 136 - 145 mmol/L Final     Potassium   Date Value Ref Range Status   04/08/2025 3.8 3.5 - 5.1 mmol/L Final     Chloride   Date Value Ref Range Status   04/08/2025 106 98 - 107 mmol/L Final     CO2   Date Value Ref Range Status   04/08/2025 29 22 - 29 mmol/L Final     Blood Urea Nitrogen   Date Value Ref Range Status   04/08/2025 20.1 9.8 - 20.1 mg/dL Final     Creatinine   Date Value Ref Range Status   04/08/2025  1.84 (H) 0.55 - 1.02 mg/dL Final     Calcium   Date Value Ref Range Status   04/08/2025 10.0 8.4 - 10.2 mg/dL Final     Albumin   Date Value Ref Range Status   04/08/2025 3.5 3.5 - 5.0 g/dL Final     Bilirubin Total   Date Value Ref Range Status   04/08/2025 0.4 <=1.5 mg/dL Final     ALP   Date Value Ref Range Status   04/08/2025 71 40 - 150 unit/L Final     AST   Date Value Ref Range Status   04/08/2025 14 11 - 45 unit/L Final     ALT   Date Value Ref Range Status   04/08/2025 12 0 - 55 unit/L Final     Estimated GFR-Non    Date Value Ref Range Status   02/04/2022 35 >=90      Lab Results   Component Value Date    CHOL 124 09/20/2024     Lab Results   Component Value Date    HDL 45 09/20/2024     Lab Results   Component Value Date    TRIG 129 09/20/2024     Lab Results   Component Value Date    VLDL 26 09/20/2024     Lab Results   Component Value Date    LDL 53.00 09/20/2024     Lab Results   Component Value Date    TSH 0.769 07/09/2020     Lab Results   Component Value Date    PHUR 6.5 04/08/2025    PROTEINUA 1+ (A) 04/08/2025    GLUCUA 4+ (A) 04/08/2025    KETONESU Negative 02/04/2022    OCCULTUA Negative 04/08/2025    NITRITE Negative 04/08/2025    LEUKOCYTESUR Negative 04/08/2025     Lab Results   Component Value Date    HGBA1C 6.6 09/20/2024    HGBA1C 6.3 03/08/2024    HGBA1C 6.8 09/07/2023           Assessment         ICD-10-CM ICD-9-CM   1. Type 2 diabetes mellitus with hyperglycemia, without long-term current use of insulin  E11.65 250.00     790.29   2. Breast cancer screening by mammogram  Z12.31 V76.12   3. Back muscle spasm  M62.830 724.8   4. Primary hypertension  I10 401.9   5. Stage 4 chronic kidney disease  N18.4 585.4   6. Chronic hepatitis B virus infection  B18.1 070.32   7. Severe obesity (BMI >= 40)  E66.01 278.01   8. 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 Type 2 diabetes mellitus with hyperglycemia, without long-term  current use of insulin  Assessment & Plan:  A1C today   Continue RX metformin 500 mg BID, Farxiga 10 mg daily  Diabetes Medications              dapagliflozin propanediol (FARXIGA) 10 mg tablet Take 1 tablet (10 mg total) by mouth once daily. For Diabetes    metFORMIN (GLUCOPHAGE) 500 MG tablet Take 1 tablet (500 mg total) by mouth 2 (two) times daily with meals. For Diabetes          Follow ADA diet.  Avoid soda, simple sweets, and limit rice/pasta/bread/starches and consume brown options when possible.   Maintain healthy weight with BMI goal <30.   Perform aerobic exercise for 150 minutes per week (or 5 days a week for 30 minutes each day).   Examine feet daily.     Lab Results   Component Value Date    HGBA1C 6.6 09/20/2024         Orders:  -     Cancel: Urinalysis, Reflex to Urine Culture; Future; Expected date: 04/10/2025  -     Cancel: Basic Metabolic Panel; Future; Expected date: 04/10/2025  -     Hemoglobin A1C; Future; Expected date: 04/10/2025  -     dapagliflozin propanediol (FARXIGA) 10 mg tablet; Take 1 tablet (10 mg total) by mouth once daily. For Diabetes  Dispense: 90 tablet; Refill: 2  -     metFORMIN (GLUCOPHAGE) 500 MG tablet; Take 1 tablet (500 mg total) by mouth 2 (two) times daily with meals. For Diabetes  Dispense: 180 tablet; Refill: 2  -     rosuvastatin (CRESTOR) 10 MG tablet; Take 1 tablet (10 mg total) by mouth every evening. For Cholesterol  Dispense: 90 tablet; Refill: 4  -     Urinalysis, Reflex to Urine Culture; Future; Expected date: 10/10/2025  -     Microalbumin/Creatinine Ratio, Urine; Future; Expected date: 10/10/2025  -     Lipid Panel; Future; Expected date: 10/10/2025  -     Hemoglobin A1C; Future; Expected date: 10/10/2025  -     Comprehensive Metabolic Panel; Future; Expected date: 10/10/2025  -     CBC Auto Differential; Future; Expected date: 10/10/2025    2. Breast cancer screening by mammogram  -     Mammo Digital Screening Bilat; Future; Expected date: 09/21/2025    3.  Back muscle spasm  Assessment & Plan:  Stable  Continue RX tinazadine   Perform back stretches daily.   Avoid activities than increase back pain or stiffness.   Apply heating pad, ice pack, and Icy Hot / BioFreeze as needed; alternate every 15-20 minutes.   Massage back to loosen muscles.       Orders:  -     tiZANidine (ZANAFLEX) 4 MG tablet; Take 1 tablet (4 mg total) by mouth nightly as needed (pain/spasms).  Dispense: 60 tablet; Refill: 6    4. Primary hypertension  Assessment & Plan:  BP at goal  Continue meds as directed  Hctz 25 mg daily, hydralazine 25 mg BID, irbesartan 150 mg qhs,   & amlodipine 10 mg daily  BP: 135/88  Low Sodium Diet (Dash Diet - less than 2 grams of sodium per day).  Maintain healthy weight with goal BMI <30. Exercise 30 minutes per day 5 days per week.        5. Stage 4 chronic kidney disease  Assessment & Plan:  Continued f/u with Renal   Follow renoprotective measures including Renal Diet (reduce intake of nuts, peanut butter, milk, cheese, dried beans, peas), low protein, and Low Sodium Diet (less than 2 grams per day).  Avoid NSAIDs (Aleve, Mobic, Celebrex, Ibuprofen, Advil, Toradol and Diclofenac). May take Tylenol as needed for headache/pain.  If Applicable: Control DM with goal A1C <7. BP goal <130/80. LDL goal < 100.  Stay well hydrated. Avoid alcohol and soda. Limit tea and coffee.  Smoking Cessation recommended.    Lab Results   Component Value Date    EGFRNORACEVR 32 04/08/2025     Lab Results   Component Value Date    BUN 20.1 04/08/2025     Lab Results   Component Value Date    CREATININE 1.84 (H) 04/08/2025           6. Chronic hepatitis B virus infection  Assessment & Plan:  Continued f/u with ID clinic as directed        7. Severe obesity (BMI >= 40)  Assessment & Plan:  Goal BMI < 30  Dicussed Healthy Diet &   Encouraged to exercise 3 x weekly  Increase Water Intake  Eat more fruits and vegetables  Avoid soda & alcohol        8. Chronic gout of left hip due to renal  impairment without tophus  Assessment & Plan:  Stable  Continue RX allopurinol 100 mg daily  Lab Results   Component Value Date    LABURIC 7.2 (H) 03/08/2024     Stay well hydrated by increasing water intake throughout the day.  Stressed importance of exercise and weight loss to maintain BMI <30.  Avoid alcohol, sodas, organ/glandular meats (liver, kidney, sweetbreads). Limit seafood and red meat intake.  Eat a balanced diet of fruits, vegetables, complex carbohydrates, and lean sources of protein (boneless/skinless chicken breasts, salmon, lentils, low fat dairy).  Discussed possible benefit of OTC Vitamin C supplementation.                Future Appointments   Date Time Provider Department Center   4/10/2025  8:45 AM Ketty Dumas FNP [facility] [place]    4/10/2025  9:00 AM LAB, [facility]    5/30/2025  8:50 [facility]    10/20/2025  7:20 AM Rachael Vergara FNP [facility]            Signature:     [facility]  [address] [facility] [address]    Date of encounter: 4/10/25    This note was generated with the assistance of ambient listening technology. Verbal consent was obtained by the patient and accompanying visitor(s) for the recording of patient appointment to facilitate this note. I attest to having reviewed and edited the generated note for accuracy, though some syntax or spelling errors may persist. Please contact the author of this note for any clarification.

## 2025-04-14 ENCOUNTER — TELEPHONE (OUTPATIENT)
Dept: NEPHROLOGY | Facility: CLINIC | Age: 55
End: 2025-04-14
Payer: COMMERCIAL

## 2025-04-14 DIAGNOSIS — I10 PRIMARY HYPERTENSION: ICD-10-CM

## 2025-04-14 DIAGNOSIS — E11.65 TYPE 2 DIABETES MELLITUS WITH HYPERGLYCEMIA, WITHOUT LONG-TERM CURRENT USE OF INSULIN: ICD-10-CM

## 2025-04-14 RX ORDER — IRBESARTAN 150 MG/1
150 TABLET ORAL NIGHTLY
Qty: 90 TABLET | Refills: 3 | OUTPATIENT
Start: 2025-04-14

## 2025-04-14 NOTE — TELEPHONE ENCOUNTER
----- Message from Annemarie sent at 4/14/2025  7:16 AM CDT -----  Regarding: cost of medication  Pt called stating that the medication Ita prescribed dapagliflozin will cost $600 and she can't afford it . She wants to know if something else can be sent in ..Pt # [phone]

## 2025-04-14 NOTE — TELEPHONE ENCOUNTER
Spoke with patient. Informed patient of co-pay card. Patient will call pharmacy and inform clinic if medication is still too expensive. Will attempt alternative options.

## 2025-07-16 DIAGNOSIS — E79.0 HYPERURICEMIA: ICD-10-CM

## 2025-07-16 RX ORDER — ALLOPURINOL 100 MG/1
100 TABLET ORAL
Qty: 90 TABLET | Refills: 3 | Status: SHIPPED | OUTPATIENT
Start: 2025-07-16

## 2025-08-14 ENCOUNTER — PATIENT MESSAGE (OUTPATIENT)
Dept: NEPHROLOGY | Facility: CLINIC | Age: 55
End: 2025-08-14
Payer: COMMERCIAL

## 2025-08-19 ENCOUNTER — LAB VISIT (OUTPATIENT)
Dept: LAB | Facility: HOSPITAL | Age: 55
End: 2025-08-19
Attending: NURSE PRACTITIONER
Payer: COMMERCIAL

## 2025-08-19 DIAGNOSIS — N18.32 CKD STAGE G3B/A3, GFR 30-44 AND ALBUMIN CREATININE RATIO >300 MG/G: ICD-10-CM

## 2025-08-19 LAB
ALBUMIN SERPL-MCNC: 3.5 G/DL (ref 3.5–5)
ALBUMIN/CREAT UR: 227.4 MG/GM CR (ref 0–30)
ALBUMIN/GLOB SERPL: 0.9 RATIO (ref 1.1–2)
ALP SERPL-CCNC: 76 UNIT/L (ref 40–150)
ALT SERPL-CCNC: 13 UNIT/L (ref 0–55)
ANION GAP SERPL CALC-SCNC: 7 MEQ/L
AST SERPL-CCNC: 17 UNIT/L (ref 11–45)
BACTERIA #/AREA URNS AUTO: ABNORMAL /HPF
BILIRUB SERPL-MCNC: 0.5 MG/DL
BILIRUB UR QL STRIP.AUTO: NEGATIVE
BUN SERPL-MCNC: 22.5 MG/DL (ref 9.8–20.1)
CALCIUM SERPL-MCNC: 9.9 MG/DL (ref 8.4–10.2)
CHLORIDE SERPL-SCNC: 102 MMOL/L (ref 98–107)
CLARITY UR: CLEAR
CO2 SERPL-SCNC: 28 MMOL/L (ref 22–29)
COLOR UR AUTO: ABNORMAL
CREAT SERPL-MCNC: 2.15 MG/DL (ref 0.55–1.02)
CREAT UR-MCNC: 65.8 MG/DL (ref 45–106)
CREAT/UREA NIT SERPL: 10
GFR SERPLBLD CREATININE-BSD FMLA CKD-EPI: 26 ML/MIN/1.73/M2
GLOBULIN SER-MCNC: 3.9 GM/DL (ref 2.4–3.5)
GLUCOSE SERPL-MCNC: 187 MG/DL (ref 74–100)
GLUCOSE UR QL STRIP: ABNORMAL
HGB UR QL STRIP: NEGATIVE
HYALINE CASTS #/AREA URNS LPF: ABNORMAL /LPF
KETONES UR QL STRIP: NEGATIVE
LEUKOCYTE ESTERASE UR QL STRIP: NEGATIVE
MICROALBUMIN UR-MCNC: 149.6 UG/ML
NITRITE UR QL STRIP: NEGATIVE
PH UR STRIP: 6.5 [PH]
POTASSIUM SERPL-SCNC: 3.7 MMOL/L (ref 3.5–5.1)
PROT SERPL-MCNC: 7.4 GM/DL (ref 6.4–8.3)
PROT UR QL STRIP: ABNORMAL
PTH-INTACT SERPL-MCNC: 160.8 PG/ML (ref 8.7–77)
RBC #/AREA URNS AUTO: ABNORMAL /HPF
SODIUM SERPL-SCNC: 137 MMOL/L (ref 136–145)
SP GR UR STRIP.AUTO: 1.01 (ref 1–1.03)
SQUAMOUS #/AREA URNS LPF: ABNORMAL /HPF
URATE SERPL-MCNC: 8 MG/DL (ref 2.6–6)
UROBILINOGEN UR STRIP-ACNC: NORMAL
WBC #/AREA URNS AUTO: ABNORMAL /HPF

## 2025-08-19 PROCEDURE — 83970 ASSAY OF PARATHORMONE: CPT

## 2025-08-19 PROCEDURE — 81015 MICROSCOPIC EXAM OF URINE: CPT

## 2025-08-19 PROCEDURE — 84550 ASSAY OF BLOOD/URIC ACID: CPT

## 2025-08-19 PROCEDURE — 82043 UR ALBUMIN QUANTITATIVE: CPT

## 2025-08-19 PROCEDURE — 36415 COLL VENOUS BLD VENIPUNCTURE: CPT

## 2025-08-19 PROCEDURE — 80053 COMPREHEN METABOLIC PANEL: CPT

## 2025-08-21 ENCOUNTER — OFFICE VISIT (OUTPATIENT)
Dept: NEPHROLOGY | Facility: CLINIC | Age: 55
End: 2025-08-21
Payer: COMMERCIAL

## 2025-08-21 VITALS
TEMPERATURE: 98 F | RESPIRATION RATE: 18 BRPM | SYSTOLIC BLOOD PRESSURE: 126 MMHG | DIASTOLIC BLOOD PRESSURE: 81 MMHG | BODY MASS INDEX: 44.53 KG/M2 | OXYGEN SATURATION: 98 % | HEIGHT: 64 IN | WEIGHT: 260.81 LBS | HEART RATE: 74 BPM

## 2025-08-21 DIAGNOSIS — N18.4: Primary | ICD-10-CM

## 2025-08-21 DIAGNOSIS — N25.81 SECONDARY HYPERPARATHYROIDISM OF RENAL ORIGIN: ICD-10-CM

## 2025-08-21 DIAGNOSIS — E66.01 SEVERE OBESITY (BMI >= 40): ICD-10-CM

## 2025-08-21 DIAGNOSIS — E11.65 TYPE 2 DIABETES MELLITUS WITH HYPERGLYCEMIA, WITHOUT LONG-TERM CURRENT USE OF INSULIN: ICD-10-CM

## 2025-08-21 DIAGNOSIS — I10 PRIMARY HYPERTENSION: ICD-10-CM

## 2025-08-21 PROCEDURE — 99215 OFFICE O/P EST HI 40 MIN: CPT | Mod: PBBFAC

## 2025-08-21 RX ORDER — LYSINE HCL 500 MG
1 TABLET ORAL DAILY
COMMUNITY
End: 2025-08-22 | Stop reason: ALTCHOICE